# Patient Record
Sex: FEMALE | Race: WHITE | NOT HISPANIC OR LATINO | Employment: UNEMPLOYED | URBAN - METROPOLITAN AREA
[De-identification: names, ages, dates, MRNs, and addresses within clinical notes are randomized per-mention and may not be internally consistent; named-entity substitution may affect disease eponyms.]

---

## 2021-01-25 ENCOUNTER — HOSPITAL ENCOUNTER (EMERGENCY)
Facility: OTHER | Age: 26
Discharge: HOME | End: 2021-01-25
Attending: EMERGENCY MEDICINE

## 2021-01-25 VITALS
BODY MASS INDEX: 24.8 KG/M2 | DIASTOLIC BLOOD PRESSURE: 80 MMHG | SYSTOLIC BLOOD PRESSURE: 120 MMHG | HEART RATE: 68 BPM | HEIGHT: 63 IN | TEMPERATURE: 98 F | WEIGHT: 140 LBS | RESPIRATION RATE: 16 BRPM | OXYGEN SATURATION: 100 %

## 2021-01-25 DIAGNOSIS — N30.01 ACUTE CYSTITIS WITH HEMATURIA: Primary | ICD-10-CM

## 2021-01-25 LAB
AMORPH CRY URNS QL MICRO: ABNORMAL
B-HCG UR QL: NEGATIVE
BACTERIA #/AREA URNS HPF: ABNORMAL /HPF
BILIRUB UR QL STRIP: NEGATIVE
CLARITY UR: ABNORMAL
COLOR UR: ABNORMAL
CTP QC/QA: YES
GLUCOSE UR QL STRIP: ABNORMAL
HGB UR QL STRIP: ABNORMAL
HYALINE CASTS #/AREA URNS LPF: 5 /LPF
KETONES UR QL STRIP: ABNORMAL
LEUKOCYTE ESTERASE UR QL STRIP: ABNORMAL
MICROSCOPIC COMMENT: ABNORMAL
NITRITE UR QL STRIP: POSITIVE
PH UR STRIP: 5 [PH] (ref 5–8)
PROT UR QL STRIP: ABNORMAL
RBC #/AREA URNS HPF: 10 /HPF (ref 0–4)
SP GR UR STRIP: 1.02 (ref 1–1.03)
URN SPEC COLLECT METH UR: ABNORMAL
UROBILINOGEN UR STRIP-ACNC: >=8 EU/DL
WBC #/AREA URNS HPF: 50 /HPF (ref 0–5)

## 2021-01-25 PROCEDURE — 99284 EMERGENCY DEPT VISIT MOD MDM: CPT | Mod: 25

## 2021-01-25 PROCEDURE — 63600175 PHARM REV CODE 636 W HCPCS: Performed by: EMERGENCY MEDICINE

## 2021-01-25 PROCEDURE — 81000 URINALYSIS NONAUTO W/SCOPE: CPT

## 2021-01-25 PROCEDURE — 87086 URINE CULTURE/COLONY COUNT: CPT

## 2021-01-25 PROCEDURE — 96372 THER/PROPH/DIAG INJ SC/IM: CPT

## 2021-01-25 PROCEDURE — 81025 URINE PREGNANCY TEST: CPT | Performed by: EMERGENCY MEDICINE

## 2021-01-25 PROCEDURE — 25000003 PHARM REV CODE 250: Performed by: EMERGENCY MEDICINE

## 2021-01-25 RX ORDER — SULFAMETHOXAZOLE AND TRIMETHOPRIM 800; 160 MG/1; MG/1
1 TABLET ORAL
Status: COMPLETED | OUTPATIENT
Start: 2021-01-25 | End: 2021-01-25

## 2021-01-25 RX ORDER — SULFAMETHOXAZOLE AND TRIMETHOPRIM 800; 160 MG/1; MG/1
1 TABLET ORAL 2 TIMES DAILY
Qty: 10 TABLET | Refills: 0 | Status: SHIPPED | OUTPATIENT
Start: 2021-01-25 | End: 2021-01-30

## 2021-01-25 RX ORDER — KETOROLAC TROMETHAMINE 30 MG/ML
15 INJECTION, SOLUTION INTRAMUSCULAR; INTRAVENOUS
Status: COMPLETED | OUTPATIENT
Start: 2021-01-25 | End: 2021-01-25

## 2021-01-25 RX ORDER — ONDANSETRON 8 MG/1
8 TABLET, ORALLY DISINTEGRATING ORAL
Status: COMPLETED | OUTPATIENT
Start: 2021-01-25 | End: 2021-01-25

## 2021-01-25 RX ORDER — ACETAMINOPHEN 500 MG
1000 TABLET ORAL
Status: COMPLETED | OUTPATIENT
Start: 2021-01-25 | End: 2021-01-25

## 2021-01-25 RX ORDER — ONDANSETRON 4 MG/1
4 TABLET, FILM COATED ORAL EVERY 6 HOURS
Qty: 12 TABLET | Refills: 0 | Status: SHIPPED | OUTPATIENT
Start: 2021-01-25

## 2021-01-25 RX ADMIN — ACETAMINOPHEN 1000 MG: 500 TABLET, FILM COATED ORAL at 06:01

## 2021-01-25 RX ADMIN — SULFAMETHOXAZOLE AND TRIMETHOPRIM 1 TABLET: 800; 160 TABLET ORAL at 06:01

## 2021-01-25 RX ADMIN — KETOROLAC TROMETHAMINE 15 MG: 30 INJECTION, SOLUTION INTRAMUSCULAR at 05:01

## 2021-01-25 RX ADMIN — ONDANSETRON 8 MG: 8 TABLET, ORALLY DISINTEGRATING ORAL at 05:01

## 2021-01-26 LAB
BACTERIA UR CULT: NORMAL
BACTERIA UR CULT: NORMAL

## 2023-01-30 ENCOUNTER — TELEPHONE (OUTPATIENT)
Dept: OBSTETRICS AND GYNECOLOGY | Facility: CLINIC | Age: 28
End: 2023-01-30

## 2023-01-30 NOTE — TELEPHONE ENCOUNTER
Tried to reach patient, unable to leave message.    Patient has soonest appointment, patient can be offered an appointment with another provider if open.    ----- Message from Ayleen Gautam sent at 1/30/2023  4:23 PM CST -----  Regarding: sooner appt  Contact: OPALMARYLUBELIALEIA GIBSON [[id number]]  Type:  Sooner Apoointment Request    Caller is requesting a sooner appointment.  Caller declined first available appointment listed below.  Caller will not accept being placed on the waitlist and is requesting a message be sent to doctor.  Name of Caller:Belia Meeks    When is the first available appointment?5/24  Symptoms:ongoing pain in ovaries .  Would the patient rather a call back or a response via [facility]? Call back   Best Call Back Number:Home Phone      [phone]  Work Phone      Not on file.  Mobile          [phone]      Additional Information:   She declined to see anyone else she states she was recommend and needs to be seen asap.

## 2023-05-24 ENCOUNTER — OFFICE VISIT (OUTPATIENT)
Dept: OBSTETRICS AND GYNECOLOGY | Facility: CLINIC | Age: 28
End: 2023-05-24
Attending: OBSTETRICS & GYNECOLOGY

## 2023-05-24 VITALS
SYSTOLIC BLOOD PRESSURE: 100 MMHG | DIASTOLIC BLOOD PRESSURE: 60 MMHG | BODY MASS INDEX: 22.66 KG/M2 | HEIGHT: 63 IN | WEIGHT: 127.88 LBS

## 2023-05-24 DIAGNOSIS — N94.6 DYSMENORRHEA: ICD-10-CM

## 2023-05-24 DIAGNOSIS — R10.2 PELVIC PAIN IN FEMALE: Primary | ICD-10-CM

## 2023-05-24 DIAGNOSIS — N39.0 RECURRENT UTI: ICD-10-CM

## 2023-05-24 PROCEDURE — 99203 PR OFFICE/OUTPT VISIT, NEW, LEVL III, 30-44 MIN: ICD-10-PCS | Mod: S$PBB,,, | Performed by: OBSTETRICS & GYNECOLOGY

## 2023-05-24 PROCEDURE — 99999 PR PBB SHADOW E&M-EST. PATIENT-LVL III: ICD-10-PCS | Mod: PBBFAC,,, | Performed by: OBSTETRICS & GYNECOLOGY

## 2023-05-24 PROCEDURE — 99999 PR PBB SHADOW E&M-EST. PATIENT-LVL III: CPT | Mod: PBBFAC,,, | Performed by: OBSTETRICS & GYNECOLOGY

## 2023-05-24 PROCEDURE — 99213 OFFICE O/P EST LOW 20 MIN: CPT | Mod: PBBFAC | Performed by: OBSTETRICS & GYNECOLOGY

## 2023-05-24 PROCEDURE — 99203 OFFICE O/P NEW LOW 30 MIN: CPT | Mod: S$PBB,,, | Performed by: OBSTETRICS & GYNECOLOGY

## 2023-05-24 RX ORDER — METHYLPHENIDATE HYDROCHLORIDE 10 MG/1
10 TABLET ORAL 2 TIMES DAILY
COMMUNITY

## 2023-05-24 RX ORDER — METHOCARBAMOL 500 MG/1
500 TABLET, FILM COATED ORAL 4 TIMES DAILY PRN
Qty: 20 TABLET | Refills: 0 | Status: SHIPPED | OUTPATIENT
Start: 2023-05-24

## 2023-05-24 RX ORDER — HYDROCODONE BITARTRATE AND ACETAMINOPHEN 5; 325 MG/1; MG/1
1 TABLET ORAL EVERY 6 HOURS PRN
Qty: 12 TABLET | Refills: 0 | OUTPATIENT
Start: 2023-05-24 | End: 2023-11-08

## 2023-05-24 RX ORDER — DICLOFENAC POTASSIUM 25 MG/1
2 TABLET, FILM COATED ORAL 3 TIMES DAILY PRN
Qty: 30 TABLET | Refills: 3 | Status: SHIPPED | OUTPATIENT
Start: 2023-05-24

## 2023-05-24 NOTE — PROGRESS NOTES
"CC:pelvic pain, cramping, possible cyst rupture.    HPI:reports worsening pelvic cramping prior to periods over past 5 years. Stopped birth control in 2017- ortho cyclen- stopped but not because of any unclear bad reaction to it.    Does report frequent UTI's does seem to corollate with cycles.  Has noted blood from urethra at those times. No history of kidney stones. Culture proven UTIs.  Last pap neg 2022.  U/s result reviewed from [facility].      ROS:  GENERAL: Feeling well overall. Denies fever or chills.   SKIN: Denies rash or lesions.   HEAD: Denies head injury or headache.   NODES: reports enlarged lymph nodes.   ABDOMEN: see HPI  URINARY: see HPI  REPRODUCTIVE: See HPI.   HEMATOLOGIC: No easy bruisability or excessive bleeding.   MUSCULOSKELETAL: Denies joint pain or swelling.       PE:   /60   Ht 5' 3" (1.6 m)   Wt 58 kg (127 lb 13.9 oz)   LMP 04/24/2023 (Exact Date)   BMI 22.65 kg/m²     APPEARANCE: Well nourished, well developed, in no acute distress.  NODES: no cervical, supraclavicular, small inguinal lymphadenopathy  BREASTS: Symmetrical, no skin changes or visible lesions. No palpable masses, nipple discharge or adenopathy bilaterally.  ABDOMEN: Soft. No tenderness or masses. No distention. No hernias palpated. No CVA tenderness. Tender lymph nodes L>R along inguinal ligament  VULVA: No lesions. Normal external female genitalia.  URETHRAL MEATUS: Normal size and location, no lesions, no prolapse.  URETHRA: No masses, tenderness, or prolapse.  VAGINA: Moist. No lesions or lacerations noted. No abnormal discharge present. No odor present.   CERVIX: No lesions or discharge. No cervical motion tenderness. Discomfort with exam, WNL>  UTERUS: Normal size, regular shape, mobile, non-tender.  ADNEXA: Mild TTp right adnexa No fullness or masses palpated in the adnexal regions.  No nudularity at USL.  ANUS PERINEUM: Normal.      Diagnosis:  1. Pelvic pain in female    2. Recurrent UTI    3. Dysmenorrhea  "       Plan:   Start D-[place]  Lofena/ robaxin for dysmenorrhea, vicodin as emergent back up  Start OCP and give 3 months for true results  Urology referral, reach out for new UTI  Orders Placed This Encounter    Ambulatory referral/consult to Urology    norethindrone-e.estradioL-iron (LO LOESTRIN FE) 1 mg-10 mcg (24)/10 mcg (2) Tab    diclofenac potassium (LOFENA) 25 mg Tab    HYDROcodone-acetaminophen (NORCO) 5-325 mg per tablet    methocarbamoL (ROBAXIN) 500 MG Tab         Follow-up with me in 3-6 months or PRN    Lyla Barrientos,

## 2023-06-07 ENCOUNTER — PATIENT MESSAGE (OUTPATIENT)
Dept: OBSTETRICS AND GYNECOLOGY | Facility: CLINIC | Age: 28
End: 2023-06-07

## 2023-06-19 ENCOUNTER — PATIENT MESSAGE (OUTPATIENT)
Dept: OBSTETRICS AND GYNECOLOGY | Facility: CLINIC | Age: 28
End: 2023-06-19

## 2023-06-19 RX ORDER — NORETHINDRONE ACETATE AND ETHINYL ESTRADIOL .02; 1 MG/1; MG/1
1 TABLET ORAL DAILY
Qty: 90 TABLET | Refills: 1 | Status: SHIPPED | OUTPATIENT
Start: 2023-06-19 | End: 2024-06-18

## 2023-11-08 ENCOUNTER — HOSPITAL ENCOUNTER (EMERGENCY)
Facility: OTHER | Age: 28
Discharge: HOME | End: 2023-11-08
Attending: EMERGENCY MEDICINE

## 2023-11-08 VITALS
OXYGEN SATURATION: 98 % | DIASTOLIC BLOOD PRESSURE: 55 MMHG | WEIGHT: 123 LBS | HEART RATE: 68 BPM | SYSTOLIC BLOOD PRESSURE: 114 MMHG | RESPIRATION RATE: 18 BRPM | TEMPERATURE: 98 F | BODY MASS INDEX: 21.79 KG/M2

## 2023-11-08 DIAGNOSIS — R10.2 PELVIC PAIN: Primary | ICD-10-CM

## 2023-11-08 DIAGNOSIS — D21.9 FIBROID: ICD-10-CM

## 2023-11-08 LAB
ALBUMIN SERPL BCP-MCNC: 5 G/DL (ref 3.5–5.2)
ALP SERPL-CCNC: 34 U/L (ref 55–135)
ALT SERPL W/O P-5'-P-CCNC: 14 U/L (ref 10–44)
ANION GAP SERPL CALC-SCNC: 12 MMOL/L (ref 8–16)
AST SERPL-CCNC: 20 U/L (ref 10–40)
B-HCG UR QL: NEGATIVE
BACTERIA GENITAL QL WET PREP: ABNORMAL
BASOPHILS # BLD AUTO: 0.06 K/UL (ref 0–0.2)
BASOPHILS NFR BLD: 0.6 % (ref 0–1.9)
BILIRUB SERPL-MCNC: 0.6 MG/DL (ref 0.1–1)
BILIRUB UR QL STRIP: NEGATIVE
BUN SERPL-MCNC: 8 MG/DL (ref 6–20)
CALCIUM SERPL-MCNC: 9.5 MG/DL (ref 8.7–10.5)
CHLORIDE SERPL-SCNC: 104 MMOL/L (ref 95–110)
CLARITY UR: CLEAR
CLUE CELLS VAG QL WET PREP: ABNORMAL
CO2 SERPL-SCNC: 24 MMOL/L (ref 23–29)
COLOR UR: COLORLESS
CREAT SERPL-MCNC: 0.7 MG/DL (ref 0.5–1.4)
CTP QC/QA: YES
DIFFERENTIAL METHOD: ABNORMAL
EOSINOPHIL # BLD AUTO: 0 K/UL (ref 0–0.5)
EOSINOPHIL NFR BLD: 0.3 % (ref 0–8)
ERYTHROCYTE [DISTWIDTH] IN BLOOD BY AUTOMATED COUNT: 13.3 % (ref 11.5–14.5)
EST. GFR  (NO RACE VARIABLE): >60 ML/MIN/1.73 M^2
FILAMENT FUNGI VAG WET PREP-#/AREA: ABNORMAL
GLUCOSE SERPL-MCNC: 80 MG/DL (ref 70–110)
GLUCOSE UR QL STRIP: NEGATIVE
HCT VFR BLD AUTO: 40.7 % (ref 37–48.5)
HGB BLD-MCNC: 13.6 G/DL (ref 12–16)
HGB UR QL STRIP: NEGATIVE
IMM GRANULOCYTES # BLD AUTO: 0.02 K/UL (ref 0–0.04)
IMM GRANULOCYTES NFR BLD AUTO: 0.2 % (ref 0–0.5)
KETONES UR QL STRIP: ABNORMAL
LEUKOCYTE ESTERASE UR QL STRIP: NEGATIVE
LIPASE SERPL-CCNC: 19 U/L (ref 4–60)
LYMPHOCYTES # BLD AUTO: 1.5 K/UL (ref 1–4.8)
LYMPHOCYTES NFR BLD: 14.9 % (ref 18–48)
MCH RBC QN AUTO: 32.5 PG (ref 27–31)
MCHC RBC AUTO-ENTMCNC: 33.4 G/DL (ref 32–36)
MCV RBC AUTO: 97 FL (ref 82–98)
MONOCYTES # BLD AUTO: 0.5 K/UL (ref 0.3–1)
MONOCYTES NFR BLD: 5.1 % (ref 4–15)
NEUTROPHILS # BLD AUTO: 7.9 K/UL (ref 1.8–7.7)
NEUTROPHILS NFR BLD: 78.9 % (ref 38–73)
NITRITE UR QL STRIP: NEGATIVE
NRBC BLD-RTO: 0 /100 WBC
PH UR STRIP: 6 [PH] (ref 5–8)
PLATELET # BLD AUTO: 285 K/UL (ref 150–450)
PMV BLD AUTO: 10.6 FL (ref 9.2–12.9)
POTASSIUM SERPL-SCNC: 3.3 MMOL/L (ref 3.5–5.1)
PROT SERPL-MCNC: 7.9 G/DL (ref 6–8.4)
PROT UR QL STRIP: NEGATIVE
RBC # BLD AUTO: 4.19 M/UL (ref 4–5.4)
SODIUM SERPL-SCNC: 140 MMOL/L (ref 136–145)
SP GR UR STRIP: <1.005 (ref 1–1.03)
SPECIMEN SOURCE: ABNORMAL
T VAGINALIS GENITAL QL WET PREP: ABNORMAL
URN SPEC COLLECT METH UR: ABNORMAL
UROBILINOGEN UR STRIP-ACNC: NEGATIVE EU/DL
WBC # BLD AUTO: 9.97 K/UL (ref 3.9–12.7)
WBC #/AREA VAG WET PREP: ABNORMAL
YEAST GENITAL QL WET PREP: ABNORMAL

## 2023-11-08 PROCEDURE — 81003 URINALYSIS AUTO W/O SCOPE: CPT | Performed by: PHYSICIAN ASSISTANT

## 2023-11-08 PROCEDURE — 80053 COMPREHEN METABOLIC PANEL: CPT | Performed by: PHYSICIAN ASSISTANT

## 2023-11-08 PROCEDURE — 85025 COMPLETE CBC W/AUTO DIFF WBC: CPT | Performed by: PHYSICIAN ASSISTANT

## 2023-11-08 PROCEDURE — 96374 THER/PROPH/DIAG INJ IV PUSH: CPT

## 2023-11-08 PROCEDURE — 87210 SMEAR WET MOUNT SALINE/INK: CPT | Performed by: PHYSICIAN ASSISTANT

## 2023-11-08 PROCEDURE — 96361 HYDRATE IV INFUSION ADD-ON: CPT

## 2023-11-08 PROCEDURE — 99285 EMERGENCY DEPT VISIT HI MDM: CPT | Mod: 25

## 2023-11-08 PROCEDURE — 81025 URINE PREGNANCY TEST: CPT | Performed by: PHYSICIAN ASSISTANT

## 2023-11-08 PROCEDURE — 25000003 PHARM REV CODE 250: Performed by: PHYSICIAN ASSISTANT

## 2023-11-08 PROCEDURE — 83690 ASSAY OF LIPASE: CPT | Performed by: PHYSICIAN ASSISTANT

## 2023-11-08 PROCEDURE — 87491 CHLMYD TRACH DNA AMP PROBE: CPT | Performed by: PHYSICIAN ASSISTANT

## 2023-11-08 PROCEDURE — 63600175 PHARM REV CODE 636 W HCPCS: Performed by: PHYSICIAN ASSISTANT

## 2023-11-08 RX ORDER — ONDANSETRON 4 MG/1
4 TABLET, ORALLY DISINTEGRATING ORAL EVERY 8 HOURS PRN
Qty: 30 TABLET | Refills: 0 | Status: SHIPPED | OUTPATIENT
Start: 2023-11-08

## 2023-11-08 RX ORDER — HYDROCODONE BITARTRATE AND ACETAMINOPHEN 5; 325 MG/1; MG/1
1 TABLET ORAL EVERY 6 HOURS PRN
Qty: 8 TABLET | Refills: 0 | Status: SHIPPED | OUTPATIENT
Start: 2023-11-08

## 2023-11-08 RX ORDER — KETOROLAC TROMETHAMINE 30 MG/ML
10 INJECTION, SOLUTION INTRAMUSCULAR; INTRAVENOUS
Status: COMPLETED | OUTPATIENT
Start: 2023-11-08 | End: 2023-11-08

## 2023-11-08 RX ORDER — KETOROLAC TROMETHAMINE 10 MG/1
10 TABLET, FILM COATED ORAL EVERY 6 HOURS
Qty: 12 TABLET | Refills: 0 | Status: SHIPPED | OUTPATIENT
Start: 2023-11-08 | End: 2023-11-11

## 2023-11-08 RX ORDER — HYDROCODONE BITARTRATE AND ACETAMINOPHEN 5; 325 MG/1; MG/1
1 TABLET ORAL
Status: COMPLETED | OUTPATIENT
Start: 2023-11-08 | End: 2023-11-08

## 2023-11-08 RX ADMIN — SODIUM CHLORIDE 1674 ML: 0.9 INJECTION, SOLUTION INTRAVENOUS at 06:11

## 2023-11-08 RX ADMIN — HYDROCODONE BITARTRATE AND ACETAMINOPHEN 1 TABLET: 5; 325 TABLET ORAL at 06:11

## 2023-11-08 RX ADMIN — KETOROLAC TROMETHAMINE 10 MG: 30 INJECTION, SOLUTION INTRAMUSCULAR; INTRAVENOUS at 05:11

## 2023-11-08 NOTE — Clinical Note
"Devora Mayo" Jeanna was seen and treated in our emergency department on 11/8/2023.  She may return to work on 11/11/2023.       If you have any questions or concerns, please don't hesitate to call.      Gillian Rooney PA"

## 2023-11-08 NOTE — FIRST PROVIDER EVALUATION
Emergency Department TeleTriage Encounter Note      CHIEF COMPLAINT    Chief Complaint   Patient presents with    Abdominal Pain     LLQ pain over the past 6 hours with diarrhea       VITAL SIGNS   Initial Vitals [11/08/23 1420]   BP Pulse Resp Temp SpO2   (!) 144/81 89 17 97.5 °F (36.4 °C) 100 %      MAP       --            ALLERGIES    Review of patient's allergies indicates:  No Known Allergies    PROVIDER TRIAGE NOTE  Patient presents with complaint of lower abdominal pain for 6 hours prior to arrival.  Reports nausea.  Reports City discharge.  Reports diarrhea.      Phy:   Constitutional: well nourished, well developed, appearing stated age, NAD        Initial orders will be placed and care will be transferred to an alternate provider when patient is roomed for a full evaluation. Any additional orders and the final disposition will be determined by that provider.        ORDERS  Labs Reviewed - No data to display    ED Orders (720h ago, onward)      None              Virtual Visit Note: The provider triage portion of this emergency department evaluation and documentation was performed via [employer], a HIPAA-compliant telemedicine application, in concert with a tele-presenter in the room. A face to face patient evaluation with one of my colleagues will occur once the patient is placed in an emergency department room.      DISCLAIMER: This note was prepared with [employer] voice recognition transcription software. Garbled syntax, mangled pronouns, and other bizarre constructions may be attributed to that software system.

## 2023-11-08 NOTE — ED TRIAGE NOTES
LLQ abdominal pain with N/V/D x 6 hrs. No fever reported. Last emesis this morning - states she is tolerating small sips of water but still feels nauseated. Also reports slight dysuria and light vaginal discharge. Presents in no distress.

## 2023-11-08 NOTE — ED NOTES
Bed: PIT  Expected date:   Expected time:   Means of arrival:   Comments:   Information: Selecting Yes will display possible errors in your note based on the variables you have selected. This validation is only offered as a suggestion for you. PLEASE NOTE THAT THE VALIDATION TEXT WILL BE REMOVED WHEN YOU FINALIZE YOUR NOTE. IF YOU WANT TO FAX A PRELIMINARY NOTE YOU WILL NEED TO TOGGLE THIS TO 'NO' IF YOU DO NOT WANT IT IN YOUR FAXED NOTE.

## 2023-11-10 NOTE — ED PROVIDER NOTES
Encounter Date: 11/8/2023       History     Chief Complaint   Patient presents with    Abdominal Pain     LLQ pain over the past 6 hours with diarrhea     Afebrile 28-year-old female with past medical history of dysmenorrhea presents the ED for evaluation of lower quadrant abdominal pain.  Patient states symptoms began earlier today.  Does state that she is currently on her cycle.  Does state that is darker than normal.  States that she typically has dysmenorrhea however this pain was more sharp in nature.  She endorse diarrhea to triage however states that this is quite typical when she is on her cycle.  She does report some associated nausea vomiting that began this morning.  She is tolerating by mouth however continues with nausea.  She denies any fever, chills, URI symptoms or additional complaints.  Denies any concern of STI.  She does follow with gyn    The history is provided by the patient.     Review of patient's allergies indicates:  No Known Allergies  Past Medical History:   Diagnosis Date    Abnormal Pap smear of cervix      Past Surgical History:   Procedure Laterality Date    CERVICAL BIOPSY  W/ LOOP ELECTRODE EXCISION      COLPOSCOPY       Family History   Problem Relation Age of Onset    Colon cancer Maternal Uncle      Social History     Tobacco Use    Smoking status: Never    Smokeless tobacco: Never   Substance Use Topics    Alcohol use: Yes    Drug use: Never     Review of Systems  As per HPI  Physical Exam     Initial Vitals [11/08/23 1420]   BP Pulse Resp Temp SpO2   (!) 144/81 89 17 97.5 °F (36.4 °C) 100 %      MAP       --         Vitals:    11/08/23 2019   BP: (!) 114/55   Pulse: 68   Resp: 18   Temp: 98.1 °F (36.7 °C)       Physical Exam    Nursing note and vitals reviewed.  Constitutional: Vital signs are normal. She appears well-developed and well-nourished. She is cooperative.  Non-toxic appearance. She does not appear ill. No distress.   HENT:   Head: Normocephalic and atraumatic.    Eyes: Conjunctivae and lids are normal.   Neck: Neck supple.   Cardiovascular:  Normal rate and regular rhythm.           Pulmonary/Chest: Breath sounds normal. No respiratory distress. She has no wheezes. She has no rhonchi.   Abdominal: Abdomen is soft. Bowel sounds are normal. There is abdominal tenderness in the left lower quadrant.   No right CVA tenderness.  No left CVA tenderness. There is no rigidity and no guarding.   Genitourinary:    Genitourinary Comments: Slight bleeding in the vault.  No significant discharge.  No uterine or adnexal mass or tenderness     Musculoskeletal:         General: Normal range of motion.      Cervical back: Neck supple. No rigidity.     Neurological: She is alert and oriented to person, place, and time. GCS eye subscore is 4. GCS verbal subscore is 5. GCS motor subscore is 6.   Skin: Skin is warm, dry and intact. No rash noted.   Psychiatric: She has a normal mood and affect. Her speech is normal and behavior is normal. Thought content normal.       ED Course   Procedures  Labs Reviewed   VAGINAL SCREEN - Abnormal; Notable for the following components:       Result Value    WBC - Vaginal Screen Rare (*)     Bacteria - Vaginal Screen Rare (*)     All other components within normal limits   CBC W/ AUTO DIFFERENTIAL - Abnormal; Notable for the following components:    MCH 32.5 (*)     Gran # (ANC) 7.9 (*)     Gran % 78.9 (*)     Lymph % 14.9 (*)     All other components within normal limits   COMPREHENSIVE METABOLIC PANEL - Abnormal; Notable for the following components:    Potassium 3.3 (*)     Alkaline Phosphatase 34 (*)     All other components within normal limits   URINALYSIS, REFLEX TO URINE CULTURE - Abnormal; Notable for the following components:    Color, UA Colorless (*)     Specific Gravity, UA <1.005 (*)     Ketones, UA Trace (*)     All other components within normal limits    Narrative:     Specimen Source->Urine   C. TRACHOMATIS/N. GONORRHOEAE BY AMP DNA   LIPASE    POCT URINE PREGNANCY          Imaging Results              US Pelvis Comp with Transvag NON-OB (xpd (Final result)  Result time 11/08/23 18:51:43      Final result by Jamal Murdock MD (11/08/23 18:51:43)                   Impression:      No acute sonographic abnormalities of the uterus or ovaries noting small uterine leiomyoma.      Electronically signed by: Jamal Murdock MD  Date:    11/08/2023  Time:    18:51               Narrative:    EXAMINATION:  US PELVIS COMP WITH TRANSVAG NON-OB (XPD)    CLINICAL HISTORY:  left pelvic pain;    TECHNIQUE:  Transabdominal sonography of the pelvis was performed, followed by transvaginal sonography to better evaluate the uterus and ovaries.    COMPARISON:  None.    FINDINGS:  The uterus measures approximately 6.0 x 3.0 x 4.0 cm.  There is a uterine leiomyoma measuring approximately 0.5 x 0.5 x 0.5 cm.  Thin images stripe is not thickened measuring 0.5 cm.  No abnormal endometrial hyperemia.  There are cervical nabothian cysts.    The right ovary measures approximately 2.6 x 2.0 x 2.0 cm.  The left ovary measures approximately 2.8 x 1.4 x 2.2 cm.  Arterial and venous flow is documented to the ovaries bilaterally.  No significant free fluid in the pelvis.                                       Medications   ketorolac injection 9.999 mg (9.999 mg Intravenous Given 11/8/23 1740)   sodium chloride 0.9% bolus 1,674 mL 1,674 mL (0 mLs Intravenous Stopped 11/8/23 1932)   HYDROcodone-acetaminophen 5-325 mg per tablet 1 tablet (1 tablet Oral Given 11/8/23 1852)     Medical Decision Making  Amount and/or Complexity of Data Reviewed  Labs: ordered.  Radiology: ordered.    Risk  Prescription drug management.                          Medical Decision Making:   History:   Old Medical Records: I decided to obtain old medical records.  Initial Assessment:   Emergent evaluation of 28-year-old female presenting with acute left lower quadrant pain.  Describes it as sharp.  Says she is  similar episode some time ago for suspected possible ovarian cyst endorse dysmenorrhea.  Does report she is discuss endometriosis diagnosis with her gyn given her history of dysmenorrhea.  She appears well although in some distress secondary to pain.  Exam notable for tenderness palpation of left lower quadrant.  No guarding, rebound or rigidity.   exam all slight bleeding however no additional abnormalities at this  Differential Diagnosis:   Differential Diagnosis includes, but is not limited to:  STI, HSV, BV, PID, TOA, vaginal foreign body, vaginal trauma, ovarian torsion, ovarian cyst, UTI/pyelonephritis, pregnancy complication, dysmenorrhea, mittelschmertz, appendicitis, nephrolithiasis, appendicitis, colitis, diverticulitis,      Clinical Tests:   Lab Tests: Reviewed and Ordered  Radiological Study: Reviewed and Ordered  ED Management:  Plan for labs, pelvic and transvaginal ultrasound.  Infra IV fluids and analgesia.  Nausea did resolve while here.  Labs grossly unremarkable including wet prep.  G/C pending however lower suspicion given exam findings and patient's low concern.  Ultrasound does reveal small uterine red.  Discuss via low lower suspicion for the acute pain.  It to dysmenorrhea.  She reported some improvement in the ER and will send home with anti-inflammatory and short course of pain medication.  No findings on ultrasound today to suggest torsion and low suspicion of intermittent torsion.  Low suspicion of PID.  Discussed continued monitoring for any new or worsening symptoms and prompt return to the ED for this.Given instructions to return for any acute symptoms and verbalized understanding of this medical plan.        Clinical Impression:   Final diagnoses:  [R10.2] Pelvic pain (Primary)  [D21.9] Fibroid        ED Disposition Condition    Discharge Stable          ED Prescriptions       Medication Sig Dispense Start Date End Date Auth. Provider    ketorolac (TORADOL) 10 mg tablet Take 1  tablet (10 mg total) by mouth every 6 (six) hours. for 3 days 12 tablet 11/8/2023 11/11/2023 Gillian Rooney PA    ondansetron (ZOFRAN-ODT) 4 MG TbDL Take 1 tablet (4 mg total) by mouth every 8 (eight) hours as needed. 30 tablet 11/8/2023 -- Gillian Rooney PA    HYDROcodone-acetaminophen (NORCO) 5-325 mg per tablet Take 1 tablet by mouth every 6 (six) hours as needed for Pain. 8 tablet 11/8/2023 -- Gillian Rooney PA          Follow-up Information       Follow up With Specialties Details Why Contact Lyla Ivan, DO Obstetrics and Gynecology Schedule an appointment as soon as possible for a visit   [address] [place] [id number]  [phone]               Gillian Rooney PA  11/10/23 5944

## 2023-11-15 LAB
C TRACH DNA SPEC QL NAA+PROBE: NOT DETECTED
N GONORRHOEA DNA SPEC QL NAA+PROBE: NOT DETECTED

## 2024-04-18 ENCOUNTER — TELEPHONE (OUTPATIENT)
Dept: OBSTETRICS AND GYNECOLOGY | Facility: CLINIC | Age: 29
End: 2024-04-18

## 2024-04-18 NOTE — TELEPHONE ENCOUNTER
----- Message from Ron Stone sent at 4/18/2024  4:06 PM CDT -----      Name of Who is Calling: BELIA JOSEPH [[id number]]      What is the request in detail: Pt called regarding painful cramps and would like medication.Please contact to further discuss and advise.          Can the clinic reply by [facility]: Y      What Number to Call Back [facility]:  [phone]

## 2024-05-16 ENCOUNTER — OFFICE VISIT (OUTPATIENT)
Dept: OBSTETRICS AND GYNECOLOGY | Facility: CLINIC | Age: 29
End: 2024-05-16
Payer: COMMERCIAL

## 2024-05-16 ENCOUNTER — PATIENT MESSAGE (OUTPATIENT)
Dept: OBSTETRICS AND GYNECOLOGY | Facility: CLINIC | Age: 29
End: 2024-05-16
Payer: COMMERCIAL

## 2024-05-16 VITALS — WEIGHT: 125.25 LBS | BODY MASS INDEX: 22.19 KG/M2 | HEIGHT: 63 IN

## 2024-05-16 DIAGNOSIS — N94.6 DYSMENORRHEA: Primary | ICD-10-CM

## 2024-05-16 DIAGNOSIS — R10.32 LLQ PAIN: ICD-10-CM

## 2024-05-16 PROCEDURE — 87086 URINE CULTURE/COLONY COUNT: CPT | Performed by: OBSTETRICS & GYNECOLOGY

## 2024-05-16 PROCEDURE — 87591 N.GONORRHOEAE DNA AMP PROB: CPT | Performed by: OBSTETRICS & GYNECOLOGY

## 2024-05-16 PROCEDURE — 3008F BODY MASS INDEX DOCD: CPT | Mod: CPTII,S$GLB,, | Performed by: OBSTETRICS & GYNECOLOGY

## 2024-05-16 PROCEDURE — 99999 PR PBB SHADOW E&M-EST. PATIENT-LVL III: CPT | Mod: PBBFAC,,, | Performed by: OBSTETRICS & GYNECOLOGY

## 2024-05-16 PROCEDURE — 3044F HG A1C LEVEL LT 7.0%: CPT | Mod: CPTII,S$GLB,, | Performed by: OBSTETRICS & GYNECOLOGY

## 2024-05-16 PROCEDURE — 87491 CHLMYD TRACH DNA AMP PROBE: CPT | Performed by: OBSTETRICS & GYNECOLOGY

## 2024-05-16 PROCEDURE — 99215 OFFICE O/P EST HI 40 MIN: CPT | Mod: S$GLB,,, | Performed by: OBSTETRICS & GYNECOLOGY

## 2024-05-16 RX ORDER — KETOROLAC TROMETHAMINE 10 MG/1
10 TABLET, FILM COATED ORAL EVERY 6 HOURS
Qty: 20 TABLET | Refills: 1 | Status: SHIPPED | OUTPATIENT
Start: 2024-05-16 | End: 2024-05-27

## 2024-05-16 RX ORDER — DROSPIRENONE AND ETHINYL ESTRADIOL 0.02-3(28)
1 KIT ORAL DAILY
Qty: 90 TABLET | Refills: 4 | Status: SHIPPED | OUTPATIENT
Start: 2024-05-16 | End: 2024-06-15

## 2024-05-16 NOTE — PROGRESS NOTES
History & Physical  Gynecology      SUBJECTIVE:     Chief Complaint: Pelvic Pain       History of Present Illness:  Pt is a 30 y/o who presents with complaints of pelvic pain.  Pt reports that she has had extensive cramps for the last 8 years.  Has started having intense LLQ pain for the past 2 year.  Has pinpoint pain in LLQ that starts with a kneading pressure in the area right before her period and progresses to a sharp pain in that area.  Pt is currently on menses.  Menses are 4 days and very painful.  Menses is not heavy.  Pt started microgestin x 2 weeks but had some mood changes with it and had to stop it.  Was on ortho tri-cyclen and ortho-cyclen in the past.  Period were slightly improved but still cramping when on the OCPs.  Pt has some constipation at baseline.  When she uses the restroom, will have sharp shooting pain in that area.  Reports that she's not straining with BMs.  No blood in stool.  Seen by GI in the past and colonoscopy was normal.  Never been told that she has endometriosis.  Pt is sexually active.  Will occasionally have pain.  Has bulge on left side of abdomen, worse with standing.        Review of patient's allergies indicates:  No Known Allergies    Past Medical History:   Diagnosis Date    Abnormal Pap smear of cervix      Past Surgical History:   Procedure Laterality Date    CERVICAL BIOPSY  W/ LOOP ELECTRODE EXCISION      COLPOSCOPY       OB History          0    Para   0    Term   0       0    AB   0    Living   0         SAB   0    IAB   0    Ectopic   0    Multiple   0    Live Births   0               Family History   Problem Relation Name Age of Onset    Colon cancer Maternal Uncle       Social History     Tobacco Use    Smoking status: Never    Smokeless tobacco: Never   Substance Use Topics    Alcohol use: Yes    Drug use: Never       Current Outpatient Medications   Medication Sig    diclofenac potassium (LOFENA) 25 mg Tab Take 2 tablets by mouth 3 (three) times  daily as needed (dysmenorrhea).    drospirenone-ethinyl estradioL (NBA) 3-0.02 mg per tablet Take 1 tablet by mouth once daily.    HYDROcodone-acetaminophen (NORCO) 5-325 mg per tablet Take 1 tablet by mouth every 6 (six) hours as needed for Pain.    ketorolac (TORADOL) 10 mg tablet Take 1 tablet (10 mg total) by mouth every 6 (six) hours. for 5 days    methocarbamoL (ROBAXIN) 500 MG Tab Take 1 tablet (500 mg total) by mouth 4 (four) times daily as needed (cramping).    methylphenidate HCl (RITALIN) 10 MG tablet Take 10 mg by mouth 2 (two) times daily.    norethindrone-ethinyl estradiol (MICROGESTIN 1/20) 1-20 mg-mcg per tablet Take 1 tablet by mouth once daily.    ondansetron (ZOFRAN) 4 MG tablet Take 1 tablet (4 mg total) by mouth every 6 (six) hours. (Patient not taking: Reported on 5/24/2023)    ondansetron (ZOFRAN-ODT) 4 MG TbDL Take 1 tablet (4 mg total) by mouth every 8 (eight) hours as needed.     No current facility-administered medications for this visit.         Review of Systems:  Review of Systems   Constitutional:  Negative for activity change, appetite change, chills, fatigue, fever and unexpected weight change.   Respiratory:  Negative for cough, shortness of breath and wheezing.    Cardiovascular:  Negative for chest pain and leg swelling.   Gastrointestinal:  Negative for abdominal pain, constipation, diarrhea, nausea and vomiting.   Endocrine: Negative for hair loss and hot flashes.   Genitourinary:  Positive for dysmenorrhea and pelvic pain. Negative for decreased libido, dyspareunia, dysuria, frequency, menstrual problem, vaginal bleeding, vaginal discharge and vaginal pain.   Integumentary:  Negative for acne, hair changes, nipple discharge and breast skin changes.   Neurological:  Negative for headaches.   Psychiatric/Behavioral:  Negative for sleep disturbance.    Breast: Negative for mastodynia, nipple discharge and skin changes       OBJECTIVE:     Physical Exam:  Physical  Exam  Constitutional:       Appearance: She is well-developed.   HENT:      Head: Normocephalic and atraumatic.   Eyes:      General: No scleral icterus.        Right eye: No discharge.         Left eye: No discharge.      Conjunctiva/sclera: Conjunctivae normal.   Pulmonary:      Effort: Pulmonary effort is normal.      Breath sounds: No stridor.   Abdominal:      General: There is no distension.      Palpations: Abdomen is soft.      Tenderness: There is no abdominal tenderness.   Genitourinary:     Comments: Normal external genitalia.  Normal hair distribution.  Urethral meatus normal. No cervical lesions or masses.  No vaginal bleeding noted. On bimanual, no adnexal masses or tenderness noted.  Tenderness on abdomen in LLQ noted.  Pain appears more superficial than deep, as palpation from the inside does not illicit the same pain as the abdomen.  When standing, minor bulge noted on left side.  TTP making exam difficult on that side.  Musculoskeletal:         General: Normal range of motion.   Skin:     General: Skin is warm and dry.   Neurological:      Mental Status: She is alert and oriented to person, place, and time.   Psychiatric:         Behavior: Behavior normal.         Thought Content: Thought content normal.         Judgment: Judgment normal.           ASSESSMENT:       ICD-10-CM ICD-9-CM    1. Dysmenorrhea  N94.6 625.3 drospirenone-ethinyl estradioL (NBA) 3-0.02 mg per tablet      ketorolac (TORADOL) 10 mg tablet      2. LLQ pain  R10.32 789.04 US Abdomen Complete      C. trachomatis/N. gonorrhoeae by AMP DNA      Urine culture             Plan:      Devora was seen today for pelvic pain.    Diagnoses and all orders for this visit:    Dysmenorrhea  -Discussed possible etiologies of pain including dysmenorrhea, endometriosis, GI issues, hernia.   - Discussed concern for endometriosis and ways that this is diagnosed and treated.  Pt would like to proceed with OCPs and pain control.  Does not want dx  laparoscopy at this time.  -     drospirenone-ethinyl estradioL (NBA) 3-0.02 mg per tablet; Take 1 tablet by mouth once daily.  -     ketorolac (TORADOL) 10 mg tablet; Take 1 tablet (10 mg total) by mouth every 6 (six) hours. for 5 days    LLQ pain  - Given mild bulge on left side, will get US to assess for hernia, as well as other etiologies of pain  - Will also test for infections.  -     US Abdomen Complete; Future  -     C. trachomatis/N. gonorrhoeae by AMP DNA  -     Urine culture        Orders Placed This Encounter   Procedures    Urine culture    US Abdomen Complete    C. trachomatis/N. gonorrhoeae by AMP DNA       No follow-ups on file.    Face to Face time with patient: 38 min    45 minutes of total time spent on the encounter, which includes face to face time and non-face to face time preparing to see the patient (eg, review of tests), Obtaining and/or reviewing separately obtained history, Documenting clinical information in the electronic or other health record, Independently interpreting results (not separately reported) and communicating results to the patient/family/caregiver, or Care coordination (not separately reported).      Lakisha Matos

## 2024-05-17 ENCOUNTER — HOSPITAL ENCOUNTER (OUTPATIENT)
Dept: RADIOLOGY | Facility: OTHER | Age: 29
Discharge: HOME | End: 2024-05-17
Attending: OBSTETRICS & GYNECOLOGY
Payer: COMMERCIAL

## 2024-05-17 DIAGNOSIS — R10.32 LLQ PAIN: ICD-10-CM

## 2024-05-17 PROCEDURE — 76705 ECHO EXAM OF ABDOMEN: CPT | Mod: TC

## 2024-05-17 PROCEDURE — 76705 ECHO EXAM OF ABDOMEN: CPT | Mod: 26,,, | Performed by: RADIOLOGY

## 2024-05-18 ENCOUNTER — HOSPITAL ENCOUNTER (EMERGENCY)
Facility: OTHER | Age: 29
Discharge: HOME | End: 2024-05-19
Attending: INTERNAL MEDICINE
Payer: COMMERCIAL

## 2024-05-18 DIAGNOSIS — R93.2 ABNORMAL CT OF LIVER: Primary | ICD-10-CM

## 2024-05-18 DIAGNOSIS — R10.9 ABDOMINAL PAIN, UNSPECIFIED ABDOMINAL LOCATION: ICD-10-CM

## 2024-05-18 LAB
ALBUMIN SERPL BCP-MCNC: 4.4 G/DL (ref 3.5–5.2)
ALP SERPL-CCNC: 29 U/L (ref 55–135)
ALT SERPL W/O P-5'-P-CCNC: 12 U/L (ref 10–44)
ANION GAP SERPL CALC-SCNC: 11 MMOL/L (ref 8–16)
AST SERPL-CCNC: 17 U/L (ref 10–40)
B-HCG UR QL: NEGATIVE
BASOPHILS # BLD AUTO: 0.04 K/UL (ref 0–0.2)
BASOPHILS NFR BLD: 0.7 % (ref 0–1.9)
BILIRUB SERPL-MCNC: 0.3 MG/DL (ref 0.1–1)
BILIRUB UR QL STRIP: NEGATIVE
BILIRUB UR QL STRIP: NEGATIVE
BUN SERPL-MCNC: 11 MG/DL (ref 6–20)
CALCIUM SERPL-MCNC: 9.5 MG/DL (ref 8.7–10.5)
CHLORIDE SERPL-SCNC: 105 MMOL/L (ref 95–110)
CLARITY UR: CLEAR
CLARITY UR: CLEAR
CO2 SERPL-SCNC: 24 MMOL/L (ref 23–29)
COLOR UR: COLORLESS
COLOR UR: COLORLESS
CREAT SERPL-MCNC: 0.8 MG/DL (ref 0.5–1.4)
CTP QC/QA: YES
DIFFERENTIAL METHOD BLD: ABNORMAL
EOSINOPHIL # BLD AUTO: 0.1 K/UL (ref 0–0.5)
EOSINOPHIL NFR BLD: 1.8 % (ref 0–8)
ERYTHROCYTE [DISTWIDTH] IN BLOOD BY AUTOMATED COUNT: 13 % (ref 11.5–14.5)
EST. GFR  (NO RACE VARIABLE): >60 ML/MIN/1.73 M^2
GLUCOSE SERPL-MCNC: 85 MG/DL (ref 70–110)
GLUCOSE UR QL STRIP: NEGATIVE
GLUCOSE UR QL STRIP: NEGATIVE
HCT VFR BLD AUTO: 36.9 % (ref 37–48.5)
HGB BLD-MCNC: 12.1 G/DL (ref 12–16)
HGB UR QL STRIP: NEGATIVE
HGB UR QL STRIP: NEGATIVE
IMM GRANULOCYTES # BLD AUTO: 0.01 K/UL (ref 0–0.04)
IMM GRANULOCYTES NFR BLD AUTO: 0.2 % (ref 0–0.5)
KETONES UR QL STRIP: NEGATIVE
KETONES UR QL STRIP: NEGATIVE
LEUKOCYTE ESTERASE UR QL STRIP: NEGATIVE
LEUKOCYTE ESTERASE UR QL STRIP: NEGATIVE
LIPASE SERPL-CCNC: 64 U/L (ref 4–60)
LYMPHOCYTES # BLD AUTO: 2.3 K/UL (ref 1–4.8)
LYMPHOCYTES NFR BLD: 40.3 % (ref 18–48)
MCH RBC QN AUTO: 32 PG (ref 27–31)
MCHC RBC AUTO-ENTMCNC: 32.8 G/DL (ref 32–36)
MCV RBC AUTO: 98 FL (ref 82–98)
MONOCYTES # BLD AUTO: 0.3 K/UL (ref 0.3–1)
MONOCYTES NFR BLD: 5.5 % (ref 4–15)
NEUTROPHILS # BLD AUTO: 2.9 K/UL (ref 1.8–7.7)
NEUTROPHILS NFR BLD: 51.5 % (ref 38–73)
NITRITE UR QL STRIP: NEGATIVE
NITRITE UR QL STRIP: NEGATIVE
NRBC BLD-RTO: 0 /100 WBC
PH UR STRIP: 6 [PH] (ref 5–8)
PH UR STRIP: 6 [PH] (ref 5–8)
PLATELET # BLD AUTO: 235 K/UL (ref 150–450)
PMV BLD AUTO: 10.7 FL (ref 9.2–12.9)
POTASSIUM SERPL-SCNC: 3.8 MMOL/L (ref 3.5–5.1)
PROT SERPL-MCNC: 7 G/DL (ref 6–8.4)
PROT UR QL STRIP: NEGATIVE
PROT UR QL STRIP: NEGATIVE
RBC # BLD AUTO: 3.78 M/UL (ref 4–5.4)
SODIUM SERPL-SCNC: 140 MMOL/L (ref 136–145)
SP GR UR STRIP: 1 (ref 1–1.03)
SP GR UR STRIP: 1 (ref 1–1.03)
URN SPEC COLLECT METH UR: ABNORMAL
URN SPEC COLLECT METH UR: ABNORMAL
UROBILINOGEN UR STRIP-ACNC: NEGATIVE EU/DL
UROBILINOGEN UR STRIP-ACNC: NEGATIVE EU/DL
WBC # BLD AUTO: 5.59 K/UL (ref 3.9–12.7)

## 2024-05-18 PROCEDURE — 99285 EMERGENCY DEPT VISIT HI MDM: CPT | Mod: 25

## 2024-05-18 PROCEDURE — 83690 ASSAY OF LIPASE: CPT | Performed by: PHYSICIAN ASSISTANT

## 2024-05-18 PROCEDURE — 96361 HYDRATE IV INFUSION ADD-ON: CPT

## 2024-05-18 PROCEDURE — 85025 COMPLETE CBC W/AUTO DIFF WBC: CPT | Performed by: PHYSICIAN ASSISTANT

## 2024-05-18 PROCEDURE — 81003 URINALYSIS AUTO W/O SCOPE: CPT | Mod: 91 | Performed by: PHYSICIAN ASSISTANT

## 2024-05-18 PROCEDURE — 80053 COMPREHEN METABOLIC PANEL: CPT | Performed by: PHYSICIAN ASSISTANT

## 2024-05-18 PROCEDURE — 63600175 PHARM REV CODE 636 W HCPCS: Performed by: NURSE PRACTITIONER

## 2024-05-18 PROCEDURE — 25000003 PHARM REV CODE 250: Performed by: NURSE PRACTITIONER

## 2024-05-18 PROCEDURE — 96375 TX/PRO/DX INJ NEW DRUG ADDON: CPT

## 2024-05-18 PROCEDURE — 81025 URINE PREGNANCY TEST: CPT | Performed by: PHYSICIAN ASSISTANT

## 2024-05-18 PROCEDURE — 96374 THER/PROPH/DIAG INJ IV PUSH: CPT | Mod: 59

## 2024-05-18 PROCEDURE — 81003 URINALYSIS AUTO W/O SCOPE: CPT | Performed by: NURSE PRACTITIONER

## 2024-05-18 PROCEDURE — 25500020 PHARM REV CODE 255: Performed by: NURSE PRACTITIONER

## 2024-05-18 RX ORDER — KETOROLAC TROMETHAMINE 30 MG/ML
15 INJECTION, SOLUTION INTRAMUSCULAR; INTRAVENOUS
Status: COMPLETED | OUTPATIENT
Start: 2024-05-18 | End: 2024-05-18

## 2024-05-18 RX ORDER — HYDROCODONE BITARTRATE AND ACETAMINOPHEN 5; 325 MG/1; MG/1
1 TABLET ORAL
Status: COMPLETED | OUTPATIENT
Start: 2024-05-18 | End: 2024-05-18

## 2024-05-18 RX ORDER — ONDANSETRON HYDROCHLORIDE 2 MG/ML
4 INJECTION, SOLUTION INTRAVENOUS ONCE
Status: COMPLETED | OUTPATIENT
Start: 2024-05-18 | End: 2024-05-18

## 2024-05-18 RX ORDER — MORPHINE SULFATE 4 MG/ML
4 INJECTION, SOLUTION INTRAMUSCULAR; INTRAVENOUS ONCE
Status: COMPLETED | OUTPATIENT
Start: 2024-05-18 | End: 2024-05-18

## 2024-05-18 RX ORDER — HYDROCODONE BITARTRATE AND ACETAMINOPHEN 5; 325 MG/1; MG/1
1 TABLET ORAL EVERY 6 HOURS PRN
Qty: 6 TABLET | Refills: 0 | Status: SHIPPED | OUTPATIENT
Start: 2024-05-18 | End: 2024-05-22

## 2024-05-18 RX ORDER — ONDANSETRON 4 MG/1
4 TABLET, ORALLY DISINTEGRATING ORAL
Status: DISCONTINUED | OUTPATIENT
Start: 2024-05-18 | End: 2024-05-18

## 2024-05-18 RX ADMIN — IOHEXOL 75 ML: 350 INJECTION, SOLUTION INTRAVENOUS at 08:05

## 2024-05-18 RX ADMIN — KETOROLAC TROMETHAMINE 15 MG: 30 INJECTION, SOLUTION INTRAMUSCULAR at 07:05

## 2024-05-18 RX ADMIN — ONDANSETRON 4 MG: 2 INJECTION INTRAMUSCULAR; INTRAVENOUS at 07:05

## 2024-05-18 RX ADMIN — HYDROCODONE BITARTRATE AND ACETAMINOPHEN 1 TABLET: 5; 325 TABLET ORAL at 10:05

## 2024-05-18 RX ADMIN — MORPHINE SULFATE 4 MG: 4 INJECTION, SOLUTION INTRAMUSCULAR; INTRAVENOUS at 09:05

## 2024-05-18 RX ADMIN — SODIUM CHLORIDE, POTASSIUM CHLORIDE, SODIUM LACTATE AND CALCIUM CHLORIDE 1000 ML: 600; 310; 30; 20 INJECTION, SOLUTION INTRAVENOUS at 07:05

## 2024-05-18 NOTE — FIRST PROVIDER EVALUATION
" Emergency Department TeleTriage Encounter Note      CHIEF COMPLAINT    Chief Complaint   Patient presents with    Abdominal Pain     LLQ "stabbing pain" and bilateral flank pain, worsening with BM x 4 days. Denies dysuria.        VITAL SIGNS   Initial Vitals [05/18/24 1757]   BP Pulse Resp Temp SpO2   (!) 142/74 61 16 98.2 °F (36.8 °C) 100 %      MAP       --            ALLERGIES    Review of patient's allergies indicates:  No Known Allergies    PROVIDER TRIAGE NOTE  Patient presents with LLQ abdominal pain, bilateral flank pain, nausea and diarrhea. No melena or hematochezia. No urinary symptoms.       ORDERS  Labs Reviewed - No data to display    ED Orders (720h ago, onward)      None              Virtual Visit Note: The provider triage portion of this emergency department evaluation and documentation was performed via [employer], a HIPAA-compliant telemedicine application, in concert with a tele-presenter in the room. A face to face patient evaluation with one of my colleagues will occur once the patient is placed in an emergency department room.      DISCLAIMER: This note was prepared with [employer] voice recognition transcription software. Garbled syntax, mangled pronouns, and other bizarre constructions may be attributed to that software system.    "

## 2024-05-19 VITALS
HEIGHT: 63 IN | BODY MASS INDEX: 22.15 KG/M2 | HEART RATE: 58 BPM | TEMPERATURE: 98 F | SYSTOLIC BLOOD PRESSURE: 113 MMHG | RESPIRATION RATE: 16 BRPM | OXYGEN SATURATION: 100 % | DIASTOLIC BLOOD PRESSURE: 57 MMHG | WEIGHT: 125 LBS

## 2024-05-19 LAB
BACTERIA UR CULT: NORMAL
BACTERIA UR CULT: NORMAL

## 2024-05-19 NOTE — DISCHARGE INSTRUCTIONS
Diagnosis:   1. Abnormal CT of liver    2. Abdominal pain, unspecified abdominal location        Tests you had showed:   Labs Reviewed   CBC W/ AUTO DIFFERENTIAL - Abnormal; Notable for the following components:       Result Value    RBC 3.78 (*)     Hematocrit 36.9 (*)     MCH 32.0 (*)     All other components within normal limits   COMPREHENSIVE METABOLIC PANEL - Abnormal; Notable for the following components:    Alkaline Phosphatase 29 (*)     All other components within normal limits   LIPASE - Abnormal; Notable for the following components:    Lipase 64 (*)     All other components within normal limits   URINALYSIS, REFLEX TO URINE CULTURE - Abnormal; Notable for the following components:    Color, UA Colorless (*)     All other components within normal limits    Narrative:     Specimen Source->Urine   URINALYSIS, REFLEX TO URINE CULTURE - Abnormal; Notable for the following components:    Color, UA Colorless (*)     All other components within normal limits    Narrative:     Specimen Source->Urine   POCT URINE PREGNANCY      CT Abdomen Pelvis With IV Contrast NO Oral Contrast   Final Result   Abnormal      1. No acute process or CT findings identified to explain patient's symptoms of left lower quadrant pain.  Specifically, no significant colonic diverticular disease or left lower quadrant/pelvic inflammatory process.   2. Hepatomegaly.   3. Right hepatic lobe 1.9 cm hypodense lesion not consistent with a simple cyst and additional right hepatic lobe heterogeneous enhancing hypodense lesion which could reflect an atypical hemangioma but remains incompletely characterized.  Further characterization with elective/nonemergent MRI abdomen with hepatic mass protocol can be obtained as warranted.   4. Small volume nonspecific free pelvic fluid, commonly physiologic in this age group.   This report was flagged in Epic as containing an incidental finding.         Electronically signed by: Michael Erickson MD    Date:    05/18/2024   Time:    20:40          Treatments you received were:   Medications   lactated ringers bolus 1,000 mL (0 mLs Intravenous Stopped 5/18/24 2058)   ketorolac injection 15 mg (15 mg Intravenous Given 5/18/24 1949)   ondansetron injection 4 mg (4 mg Intravenous Given 5/18/24 1943)   iohexoL (OMNIPAQUE 350) injection 75 mL (75 mLs Intravenous Given 5/18/24 2025)   morphine injection 4 mg (4 mg Intravenous Given 5/18/24 2137)   HYDROcodone-acetaminophen 5-325 mg per tablet 1 tablet (1 tablet Oral Given 5/18/24 2229)       Home Care Instructions:  - Medications: Continue taking your home medications as prescribed    Follow-Up Plan:  - Follow-up with: Primary care doctor within 1-2  days  - Additional testing and/or evaluation will be directed by your primary doctor    Return to the Emergency Department for symptoms including but not limited to: worsening symptoms, severe back pain, shortness of breath or chest pain, vomiting with inability to hold down fluids, blood in vomit or poop, fevers greater than 100.4°F, passing out/fainting/unconsciousness, or other concerning symptoms.     Future Appointments   Date Time Provider Department Center   6/24/2024  1:30 PM Lyla Barrientos,  [facility]

## 2024-05-19 NOTE — ED PROVIDER NOTES
"Source of History:  Patient     Chief complaint:  Abdominal Pain (LLQ "stabbing pain" and bilateral flank pain, worsening with BM x 4 days. Denies dysuria. )      HPI:  Devora Meeks is a 29 y.o. female presenting to the emergency department reporting left lower quadrant pain that began 4 days ago, she developed some bilateral flank pain 2 days ago.  Reports pain worse when having a bowel movement which prompted her presents emergency department.  She would reports nausea without vomiting, no diarrhea.  Denies any fever/chills.  No history of diverticulitis.  Reports she was on the last day of her period and having brownish discharge.  Denies any dysuria.    This is the extent to the patients complaints today here in the emergency department.    PMH:  As per HPI and below:  Past Medical History:   Diagnosis Date    Abnormal Pap smear of cervix      Past Surgical History:   Procedure Laterality Date    CERVICAL BIOPSY  W/ LOOP ELECTRODE EXCISION      COLPOSCOPY         Social History     Tobacco Use    Smoking status: Never    Smokeless tobacco: Never   Substance Use Topics    Alcohol use: Yes    Drug use: Never       Review of patient's allergies indicates:  No Known Allergies    ROS: As per HPI and below:  General: No fever.  Subjective chills  ENT: No sore throat. No ear pain  Chest: No shortness of breath. No cough.    Cardiovascular: No chest pain.   Abdomen:  Left lower quadrant pain, nausea  Genito-Urinary: No abnormal urination.    Neurologic: No focal weakness.  No numbness.  No headache  MSK:  Bilateral flank pain.  Integument: No rashes or lesions.    Physical Exam:    BP (!) 113/57 (BP Location: Right arm, Patient Position: Lying)   Pulse (!) 58   Temp 97.7 °F (36.5 °C) (Oral)   Resp 16   Ht 5' 3" (1.6 m)   Wt 56.7 kg (125 lb)   LMP 05/15/2024   SpO2 100%   Breastfeeding No   BMI 22.14 kg/m²   Vitals:    05/18/24 1757 05/18/24 2137 05/18/24 2229 05/18/24 2358   BP: (!) 142/74   (!) " "113/57   Pulse: 61   (!) 58   Resp: 16 17 18 16   Temp: 98.2 °F (36.8 °C)   97.7 °F (36.5 °C)   TempSrc: Oral   Oral   SpO2: 100%   100%   Weight: 56.7 kg (125 lb)      Height: 5' 3" (1.6 m)          Nursing note and vital signs reviewed.  Appearance: No acute distress.  Uncomfortable appearing female  Eyes: No conjunctival injection.  Extraocular muscles are intact.  ENT:  Mucous membranes are pink and moist  Chest/ Respiratory: Clear to auscultation bilaterally.  Good air movement.  No wheezes.  No rhonchi. No rales. No accessory muscle use.  Cardiovascular: Regular rate and rhythm.  No murmurs. No gallops. No rubs.  Abdomen: Soft.  Left lower abdominal tenderness on exam without distention or guarding.  Back:  No CVA tenderness.  Musculoskeletal: Good range of motion all joints.  No deformities.  Neck supple.  No meningismus.  Skin: No rashes seen.  Good turgor.  No abrasions.  No ecchymoses.  Neuro: alert and oriented x3,  no focal neurological deficits.  Psych: Appropriate, conversant      Labs Reviewed   CBC W/ AUTO DIFFERENTIAL - Abnormal; Notable for the following components:       Result Value    RBC 3.78 (*)     Hematocrit 36.9 (*)     MCH 32.0 (*)     All other components within normal limits   COMPREHENSIVE METABOLIC PANEL - Abnormal; Notable for the following components:    Alkaline Phosphatase 29 (*)     All other components within normal limits   LIPASE - Abnormal; Notable for the following components:    Lipase 64 (*)     All other components within normal limits   URINALYSIS, REFLEX TO URINE CULTURE - Abnormal; Notable for the following components:    Color, UA Colorless (*)     All other components within normal limits    Narrative:     Specimen Source->Urine   URINALYSIS, REFLEX TO URINE CULTURE - Abnormal; Notable for the following components:    Color, UA Colorless (*)     All other components within normal limits    Narrative:     Specimen Source->Urine   POCT URINE PREGNANCY       CT Abdomen " Pelvis With IV Contrast NO Oral Contrast   Final Result   Abnormal      1. No acute process or CT findings identified to explain patient's symptoms of left lower quadrant pain.  Specifically, no significant colonic diverticular disease or left lower quadrant/pelvic inflammatory process.   2. Hepatomegaly.   3. Right hepatic lobe 1.9 cm hypodense lesion not consistent with a simple cyst and additional right hepatic lobe heterogeneous enhancing hypodense lesion which could reflect an atypical hemangioma but remains incompletely characterized.  Further characterization with elective/nonemergent MRI abdomen with hepatic mass protocol can be obtained as warranted.   4. Small volume nonspecific free pelvic fluid, commonly physiologic in this age group.   This report was flagged in Epic as containing an incidental finding.         Electronically signed by: Michael Erickson MD   Date:    05/18/2024   Time:    20:40            Initial Impression/ Differential Dx:  Differential diagnosis includes but not limited to: GERD, intestinal spasm, gastroenteritis, gastritis, ulcer, cholecystitis, cholelithiasis, gallstones, pancreatitis, ileus, small bowel obstruction, appendicitis, diverticulitis, colitis, constipation, intestinal gas pain      MDM:    Medical Decision Making  29-year-old female with left lower quadrant pain for the last 4 days and bilateral flank pain.  No history of diverticulitis.  On exam there was some left lower abdominal tenderness without distention or guarding.  Labs revealed no leukocytosis, stable H&H, no significant electrolyte abnormality, no SOCRATES.  Lipase slightly elevated at 64.  CT obtained revealed no acute process including diverticulitis.  She did have some nonspecific free pelvic fluid which could possibly be the cause of the patient's pain.  CT did reveal a likely cyst in her hepatic lobe, discussed findings with the patient need for follow up with the primary care.  She is currently on the end of  her menstrual cycle.  Patient was afebrile.  She reports persistent pain even after morphine administration, ultrasound ordered to assess patient's ovaries.  At time of sign-out to Dr. Jose metcalf, ultrasound was still pending    Amount and/or Complexity of Data Reviewed  Labs:  Decision-making details documented in ED Course.  Radiology: ordered.    Risk  Prescription drug management.        ED Course as of 05/19/24 2218   Sat May 18, 2024   1949 WBC: 5.59 [AS]   1947 Hemoglobin: 12.1 [AS]   1947 Hematocrit(!): 36.9 [AS]   1947 Platelet Count: 235 [AS]   2018 Lipase(!): 64 [AS]   2018 Sodium: 140 [AS]   2018 Potassium: 3.8 [AS]   2018 Glucose: 85 [AS]   2018 BUN: 11 [AS]   2018 Creatinine: 0.8 [AS]   2018 Anion Gap: 11 [AS]   2300 Received this patient in sign-out briefly this is 29 y.o. female presenting to the emergency department reporting left lower quadrant pain that began 4 days ago, she developed some bilateral flank pain 2 days ago.  CMP, CBC grossly unremarkable urinalysis not consistent with UTI lipase mildly elevated.  Urine pregnancy negative.  Her CT had some nonspecific abnormalities.  At time of sign-out decision was made to obtain a ultrasound of the ovaries as she was having ongoing pain and she was given additional dose of Norco.  Signed out to me pending reassessment and ultrasound of the pelvis with anticipated discharge home. []   Mindy May 19, 2024   0050 It was noted that patient recently had an abdominal ultrasound.  I discussed with the patient the utility of a non OB ultrasound.  Her symptoms do not seem to be consistent with ovarian torsion.  Her repeat abdominal exam is generally benign.  Her CT was reviewed and she p.o. challenged successfully.  She was given a short course of Norco.  Instructed to follow up with internal medicine and given GI outpatient follow-up.  Strict return precautions given.  Care plan addressed with patient and all those present. All questions answered.  Strict  return precautions discussed.  Patient was instructed on the correct follow-up time and route.  They voiced verbal understanding and agreement  with the plan and were deemed stable for discharge.  [HM]      ED Course User Index  [AS] Brandi Ashford, ANASTACIO  [] Leonardo Cabrera MD       Diagnostic Impression:    1. Abnormal CT of liver    2. Abdominal pain, unspecified abdominal location         ED Disposition Condition    Discharge Stable            ED Prescriptions       Medication Sig Dispense Start Date End Date Auth. Provider    HYDROcodone-acetaminophen (NORCO) 5-325 mg per tablet Take 1 tablet by mouth every 6 (six) hours as needed for Pain. 6 tablet 5/18/2024 -- Leonardo Cabrera MD          Follow-up Information       Follow up With Specialties Details Why Contact Info    [facility]  Schedule an appointment as soon as possible for a visit in 2 days or the primary care doctor of your choice [address] [facility] [address]  [phone]      [facility] - Emergency Dept Emergency Medicine  If symptoms worsen [address] [facility] [id number] [phone]    Robert Licona MD Gastroenterology   [address] [place] [address] [address]  [facility] [address]  [phone]                 Brandi Ashford, ANASTACIO  05/18/24 2250       Brandi Ashford, ANASTACIO  05/19/24 [id number]

## 2024-05-20 ENCOUNTER — PATIENT MESSAGE (OUTPATIENT)
Dept: OBSTETRICS AND GYNECOLOGY | Facility: CLINIC | Age: 29
End: 2024-05-20
Payer: COMMERCIAL

## 2024-05-21 ENCOUNTER — TELEPHONE (OUTPATIENT)
Dept: GASTROENTEROLOGY | Facility: CLINIC | Age: 29
End: 2024-05-21
Payer: COMMERCIAL

## 2024-05-21 LAB
C TRACH DNA SPEC QL NAA+PROBE: NOT DETECTED
N GONORRHOEA DNA SPEC QL NAA+PROBE: NOT DETECTED

## 2024-05-21 NOTE — TELEPHONE ENCOUNTER
----- Message from Raquel Calderón sent at 5/21/2024  1:57 PM CDT -----  Regarding: pranay mensaht  Contact: Pt @[phone]  Pt is calling to speak to someone in the office; asking for a sooner appt than what they are scheduled for. Pt is already on the wait list; no available appts in Epic that are coming up soon. Pt is asking to speak to someone in the office. Please call to advise. Thanks.         Reason for sooner appt: severe abdominal pain

## 2024-05-21 NOTE — TELEPHONE ENCOUNTER
Spoke with patient. Advised that I did not have any appointment earlier than 5/27 at this point.  Also advised if the pain is severe, she should go to ER.  Patient verbalized understanding.

## 2024-05-22 ENCOUNTER — OFFICE VISIT (OUTPATIENT)
Dept: PRIMARY CARE CLINIC | Facility: CLINIC | Age: 29
End: 2024-05-22
Payer: COMMERCIAL

## 2024-05-22 VITALS
HEART RATE: 79 BPM | BODY MASS INDEX: 23.17 KG/M2 | WEIGHT: 130.75 LBS | HEIGHT: 63 IN | DIASTOLIC BLOOD PRESSURE: 78 MMHG | TEMPERATURE: 99 F | OXYGEN SATURATION: 100 % | SYSTOLIC BLOOD PRESSURE: 132 MMHG

## 2024-05-22 DIAGNOSIS — R93.5 ABNORMAL CT OF THE ABDOMEN: Primary | ICD-10-CM

## 2024-05-22 DIAGNOSIS — Z13.1 SCREENING FOR DIABETES MELLITUS: ICD-10-CM

## 2024-05-22 DIAGNOSIS — Z75.8 DOES NOT HAVE PRIMARY CARE PROVIDER: ICD-10-CM

## 2024-05-22 DIAGNOSIS — Z11.59 ENCOUNTER FOR HEPATITIS C SCREENING TEST FOR LOW RISK PATIENT: ICD-10-CM

## 2024-05-22 DIAGNOSIS — K76.9 LIVER DISEASE, UNSPECIFIED: ICD-10-CM

## 2024-05-22 DIAGNOSIS — Z13.220 SCREENING CHOLESTEROL LEVEL: ICD-10-CM

## 2024-05-22 DIAGNOSIS — K42.9 UMBILICAL HERNIA WITHOUT OBSTRUCTION AND WITHOUT GANGRENE: ICD-10-CM

## 2024-05-22 DIAGNOSIS — K76.9 HEPATIC LESION: ICD-10-CM

## 2024-05-22 DIAGNOSIS — R10.32 LLQ ABDOMINAL PAIN: ICD-10-CM

## 2024-05-22 DIAGNOSIS — Z11.4 SCREENING FOR HIV (HUMAN IMMUNODEFICIENCY VIRUS): ICD-10-CM

## 2024-05-22 PROCEDURE — 1159F MED LIST DOCD IN RCRD: CPT | Mod: CPTII,S$GLB,, | Performed by: NURSE PRACTITIONER

## 2024-05-22 PROCEDURE — 3075F SYST BP GE 130 - 139MM HG: CPT | Mod: CPTII,S$GLB,, | Performed by: NURSE PRACTITIONER

## 2024-05-22 PROCEDURE — 96372 THER/PROPH/DIAG INJ SC/IM: CPT | Mod: S$GLB,,, | Performed by: NURSE PRACTITIONER

## 2024-05-22 PROCEDURE — 99215 OFFICE O/P EST HI 40 MIN: CPT | Mod: 25,S$GLB,, | Performed by: NURSE PRACTITIONER

## 2024-05-22 PROCEDURE — 99999 PR PBB SHADOW E&M-EST. PATIENT-LVL IV: CPT | Mod: PBBFAC,,, | Performed by: NURSE PRACTITIONER

## 2024-05-22 PROCEDURE — 3008F BODY MASS INDEX DOCD: CPT | Mod: CPTII,S$GLB,, | Performed by: NURSE PRACTITIONER

## 2024-05-22 PROCEDURE — 3078F DIAST BP <80 MM HG: CPT | Mod: CPTII,S$GLB,, | Performed by: NURSE PRACTITIONER

## 2024-05-22 RX ORDER — CLONAZEPAM 0.5 MG/1
0.5 TABLET ORAL 2 TIMES DAILY PRN
COMMUNITY
End: 2024-05-22

## 2024-05-22 RX ORDER — BUPROPION HYDROCHLORIDE 150 MG/1
150 TABLET ORAL
COMMUNITY
End: 2024-05-22

## 2024-05-22 RX ORDER — NEOMYCIN SULFATE, POLYMYXIN B SULFATE, HYDROCORTISONE 3.5; 10000; 1 MG/ML; [USP'U]/ML; MG/ML
SOLUTION/ DROPS AURICULAR (OTIC)
COMMUNITY
Start: 2024-04-27 | End: 2024-05-22

## 2024-05-22 RX ORDER — METHYLPHENIDATE HYDROCHLORIDE 5 MG/1
5 TABLET ORAL 2 TIMES DAILY
COMMUNITY
Start: 2024-04-24

## 2024-05-22 RX ORDER — BUSPIRONE HYDROCHLORIDE 5 MG/1
5 TABLET ORAL 2 TIMES DAILY
COMMUNITY

## 2024-05-22 RX ORDER — CALCIUM CARB/VITAMIN D3/VIT K1 500-500-40
2 TABLET,CHEWABLE ORAL DAILY
COMMUNITY
Start: 2023-09-26 | End: 2024-05-22

## 2024-05-22 RX ORDER — KETOROLAC TROMETHAMINE 30 MG/ML
30 INJECTION, SOLUTION INTRAMUSCULAR; INTRAVENOUS
Status: COMPLETED | OUTPATIENT
Start: 2024-05-22 | End: 2024-05-22

## 2024-05-22 RX ADMIN — KETOROLAC TROMETHAMINE 30 MG: 30 INJECTION, SOLUTION INTRAMUSCULAR; INTRAVENOUS at 05:05

## 2024-05-22 NOTE — PROGRESS NOTES
Subjective:       Patient ID: Devora Meeks is a 29 y.o. female.    Chief Complaint: Abdominal Pain    Ms. Devora Meeks is a 29 year old female, new to me, presents to the clinic with complaints of lower abdominal pain. No PCP. Medical and surgical history in addition to problem list reviewed as listed below.     Abdominal Pain  This is a new problem. The current episode started 1 to 4 weeks ago. The onset quality is sudden. The problem occurs constantly. The problem has been gradually worsening (states she has not had any alcoholic beverages in a week and a half). The pain is located in the LLQ. The pain is at a severity of 8/10. The pain is severe. The quality of the pain is aching and cramping. The abdominal pain does not radiate. Associated symptoms include headaches and nausea. Pertinent negatives include no belching, constipation, dysuria, frequency, hematochezia, hematuria or vomiting. Nothing aggravates the pain. The pain is relieved by Nothing. She has tried antacids and proton pump inhibitors for the symptoms. The treatment provided no relief. Prior diagnostic workup includes CT scan.     05/18/2024 CT Abdomen Pelvis with IV Contrast  Hepatomegaly.  Right hepatic lobe 1.9 cm hypodense lesion not consistent with a simple cyst     Past Medical History:   Diagnosis Date    Abnormal Pap smear of cervix         Past Surgical History:   Procedure Laterality Date    CERVICAL BIOPSY  W/ LOOP ELECTRODE EXCISION      COLPOSCOPY          Family History   Problem Relation Name Age of Onset    Colon cancer Maternal Uncle         Social History     Tobacco Use   Smoking Status Never   Smokeless Tobacco Never       Social History     Social History Narrative    Not on file       Review of patient's allergies indicates:  No Known Allergies     Review of Systems   Respiratory:  Negative for chest tightness and shortness of breath.    Cardiovascular:  Negative for chest pain and palpitations.   Gastrointestinal:  " Positive for abdominal pain and nausea. Negative for constipation, hematochezia and vomiting.   Genitourinary:  Negative for dysuria, frequency, hematuria and pelvic pain.   Neurological:  Positive for headaches. Negative for dizziness and light-headedness.   Psychiatric/Behavioral:  The patient is nervous/anxious.          Objective:      Vitals:    05/22/24 1624 05/22/24 1650   BP: (!) 144/98 132/78   BP Location: Left arm Left arm   Patient Position: Sitting Sitting   BP Method: Small (Automatic) Large (Automatic)   Pulse: 79    Temp: 98.6 °F (37 °C)    TempSrc: Oral    SpO2: 100%    Weight: 59.3 kg (130 lb 11.7 oz)    Height: 5' 3" (1.6 m)       Physical Exam  Pulmonary:      Effort: Respiratory distress present.   Abdominal:      General: Bowel sounds are normal. There is no distension.      Tenderness: There is abdominal tenderness. There is no guarding.   Neurological:      Mental Status: She is alert and oriented to person, place, and time.   Psychiatric:         Mood and Affect: Affect is tearful.         Speech: Speech is delayed.         Behavior: Behavior is cooperative.         Assessment:       1. Abnormal CT of the abdomen    2. LLQ abdominal pain    3. Umbilical hernia without obstruction and without gangrene    4. Hepatic lesion    5. Liver disease, unspecified    6. Does not have primary care provider    7. Screening cholesterol level    8. Encounter for hepatitis C screening test for low risk patient    9. Screening for HIV (human immunodeficiency virus)    10. Screening for diabetes mellitus        Plan:       Abnormal CT of the abdomen  Review of imaging on 05/18/2024    LLQ abdominal pain  Schedule with gastroenterology 05/27/2024.  Reports no relief with oral Toradol and Tylenol.       -     H. PYLORI ANTIBODY, IGG; Future; Expected date: 05/22/2024  -     ketorolac injection 30 mg    Umbilical hernia without obstruction and without gangrene  Comments:  05/18/2024 CT Abdomen Pelvis with IV " Contrast    Hepatic lesion  Comments:  05/18/2024 CT Abdomen Pelvis with IV Contrast  upper right hepatic lobe and 1.6 cm lesion  Orders:  -     MRI Abdomen W WO Contrast; Future; Expected date: 05/22/2024    Liver disease, unspecified  -     MRI Abdomen W WO Contrast; Future; Expected date: 05/22/2024    Does not have primary care provider  Plans to establish care with PCP in the future.     Screening cholesterol level  -     LIPID PANEL; Future; Expected date: 05/22/2024    Encounter for hepatitis C screening test for low risk patient  -     Hepatitis C Antibody; Future; Expected date: 05/22/2024    Screening for HIV (human immunodeficiency virus)  -     HIV 1/2 Ag/Ab (4th Gen); Future; Expected date: 05/22/2024    Screening for diabetes mellitus  -     Hemoglobin A1C; Future; Expected date: 05/22/2024       Health maintenance review/updated, refuses immunizations at this time.  RTC in one month to follow up abdominal pain, labs and imaging.    Medication List with Changes/Refills   Current Medications    BUSPIRONE (BUSPAR) 5 MG TAB    Take 5 mg by mouth 2 (two) times daily.    DICLOFENAC POTASSIUM (LOFENA) 25 MG TAB    Take 2 tablets by mouth 3 (three) times daily as needed (dysmenorrhea).    DROSPIRENONE-ETHINYL ESTRADIOL (NBA) 3-0.02 MG PER TABLET    Take 1 tablet by mouth once daily.    METHYLPHENIDATE HCL (RITALIN) 5 MG TABLET    Take 5 mg by mouth 2 (two) times daily.   Discontinued Medications    BUPROPION (WELLBUTRIN XL) 150 MG TB24 TABLET    Take 150 mg by mouth.    CHOLECALCIFEROL, VITAMIN D3, 10 MCG (400 UNIT) CAP CAPSULE    Take 2 tablets by mouth once daily.    CLONAZEPAM (KLONOPIN) 0.5 MG TABLET    Take 0.5 mg by mouth 2 (two) times daily as needed.    HYDROCODONE-ACETAMINOPHEN (NORCO) 5-325 MG PER TABLET    Take 1 tablet by mouth every 6 (six) hours as needed for Pain.    HYDROCODONE-ACETAMINOPHEN (NORCO) 5-325 MG PER TABLET    Take 1 tablet by mouth every 6 (six) hours as needed for Pain.     METHOCARBAMOL (ROBAXIN) 500 MG TAB    Take 1 tablet (500 mg total) by mouth 4 (four) times daily as needed (cramping).    METHYLPHENIDATE HCL (RITALIN) 10 MG TABLET    Take 10 mg by mouth 2 (two) times daily.    NEOMYCIN-POLYMYXIN-HYDROCORTISONE (CORTISPORIN) OTIC SOLUTION        NORETHINDRONE-ETHINYL ESTRADIOL (MICROGESTIN 1/20) 1-20 MG-MCG PER TABLET    Take 1 tablet by mouth once daily.    ONDANSETRON (ZOFRAN) 4 MG TABLET    Take 1 tablet (4 mg total) by mouth every 6 (six) hours.    ONDANSETRON (ZOFRAN-ODT) 4 MG TBDL    Take 1 tablet (4 mg total) by mouth every 8 (eight) hours as needed.        Follow up in about 1 month (around 6/22/2024), or if symptoms worsen or fail to improve, for Ana Lion, MSN, APRN, FNP-C.    I spent a total of 40 minutes on the day of the visit.This includes face to face time and non-face to face time preparing to see the patient (eg, review of tests), obtaining and/or reviewing separately obtained history, documenting clinical information in the electronic or other health record, independently interpreting results and communicating results to the patient/family/caregiver, or care coordinator.     JORGE Chaney, MSN, FNP-C

## 2024-05-22 NOTE — PATIENT INSTRUCTIONS
Please get your labs done at any [facility] facility that has a laboratory, you do not need an appointment.     I will communicate your laboratory and/or imaging results with you through your [facility] account that was set up through the portal, it was a pleasure meeting and taking care of you today.

## 2024-05-23 ENCOUNTER — LAB VISIT (OUTPATIENT)
Dept: LAB | Facility: OTHER | Age: 29
End: 2024-05-23
Attending: NURSE PRACTITIONER
Payer: COMMERCIAL

## 2024-05-23 DIAGNOSIS — R10.32 LLQ ABDOMINAL PAIN: ICD-10-CM

## 2024-05-23 DIAGNOSIS — Z13.1 SCREENING FOR DIABETES MELLITUS: ICD-10-CM

## 2024-05-23 DIAGNOSIS — Z11.4 SCREENING FOR HIV (HUMAN IMMUNODEFICIENCY VIRUS): ICD-10-CM

## 2024-05-23 DIAGNOSIS — Z11.59 ENCOUNTER FOR HEPATITIS C SCREENING TEST FOR LOW RISK PATIENT: ICD-10-CM

## 2024-05-23 DIAGNOSIS — Z13.220 SCREENING CHOLESTEROL LEVEL: ICD-10-CM

## 2024-05-23 LAB
CHOLEST SERPL-MCNC: 182 MG/DL (ref 120–199)
CHOLEST/HDLC SERPL: 3.4 {RATIO} (ref 2–5)
ESTIMATED AVG GLUCOSE: 91 MG/DL (ref 68–131)
HBA1C MFR BLD: 4.8 % (ref 4–5.6)
HCV AB SERPL QL IA: NEGATIVE
HDLC SERPL-MCNC: 53 MG/DL (ref 40–75)
HDLC SERPL: 29.1 % (ref 20–50)
HIV 1+2 AB+HIV1 P24 AG SERPL QL IA: NEGATIVE
LDLC SERPL CALC-MCNC: 107.6 MG/DL (ref 63–159)
NONHDLC SERPL-MCNC: 129 MG/DL
TRIGL SERPL-MCNC: 107 MG/DL (ref 30–150)

## 2024-05-23 PROCEDURE — 87389 HIV-1 AG W/HIV-1&-2 AB AG IA: CPT | Performed by: NURSE PRACTITIONER

## 2024-05-23 PROCEDURE — 86677 HELICOBACTER PYLORI ANTIBODY: CPT | Performed by: NURSE PRACTITIONER

## 2024-05-23 PROCEDURE — 86803 HEPATITIS C AB TEST: CPT | Performed by: NURSE PRACTITIONER

## 2024-05-23 PROCEDURE — 36415 COLL VENOUS BLD VENIPUNCTURE: CPT | Performed by: NURSE PRACTITIONER

## 2024-05-23 PROCEDURE — 80061 LIPID PANEL: CPT | Performed by: NURSE PRACTITIONER

## 2024-05-23 PROCEDURE — 83036 HEMOGLOBIN GLYCOSYLATED A1C: CPT | Performed by: NURSE PRACTITIONER

## 2024-05-24 LAB — H PYLORI IGG SERPL QL IA: NEGATIVE

## 2024-05-25 ENCOUNTER — HOSPITAL ENCOUNTER (OUTPATIENT)
Dept: RADIOLOGY | Facility: HOSPITAL | Age: 29
Discharge: HOME | End: 2024-05-25
Attending: NURSE PRACTITIONER
Payer: COMMERCIAL

## 2024-05-25 DIAGNOSIS — K76.9 HEPATIC LESION: ICD-10-CM

## 2024-05-25 DIAGNOSIS — K76.9 LIVER DISEASE, UNSPECIFIED: ICD-10-CM

## 2024-05-25 PROCEDURE — 74183 MRI ABD W/O CNTR FLWD CNTR: CPT | Mod: TC

## 2024-05-25 PROCEDURE — 25500020 PHARM REV CODE 255: Performed by: NURSE PRACTITIONER

## 2024-05-25 PROCEDURE — A9585 GADOBUTROL INJECTION: HCPCS | Performed by: NURSE PRACTITIONER

## 2024-05-25 PROCEDURE — 74183 MRI ABD W/O CNTR FLWD CNTR: CPT | Mod: 26,,, | Performed by: RADIOLOGY

## 2024-05-25 RX ORDER — GADOBUTROL 604.72 MG/ML
10 INJECTION INTRAVENOUS
Status: COMPLETED | OUTPATIENT
Start: 2024-05-25 | End: 2024-05-25

## 2024-05-25 RX ADMIN — GADOBUTROL 10 ML: 604.72 INJECTION INTRAVENOUS at 04:05

## 2024-05-27 ENCOUNTER — TELEPHONE (OUTPATIENT)
Dept: ENDOSCOPY | Facility: HOSPITAL | Age: 29
End: 2024-05-27
Payer: COMMERCIAL

## 2024-05-27 ENCOUNTER — OFFICE VISIT (OUTPATIENT)
Dept: INTERNAL MEDICINE | Facility: CLINIC | Age: 29
End: 2024-05-27
Payer: COMMERCIAL

## 2024-05-27 ENCOUNTER — OFFICE VISIT (OUTPATIENT)
Dept: GASTROENTEROLOGY | Facility: CLINIC | Age: 29
End: 2024-05-27
Payer: COMMERCIAL

## 2024-05-27 ENCOUNTER — LAB VISIT (OUTPATIENT)
Dept: LAB | Facility: HOSPITAL | Age: 29
End: 2024-05-27
Attending: INTERNAL MEDICINE
Payer: COMMERCIAL

## 2024-05-27 VITALS
HEIGHT: 63 IN | BODY MASS INDEX: 22.5 KG/M2 | DIASTOLIC BLOOD PRESSURE: 76 MMHG | BODY MASS INDEX: 22.61 KG/M2 | SYSTOLIC BLOOD PRESSURE: 130 MMHG | WEIGHT: 127 LBS | WEIGHT: 127.63 LBS | HEART RATE: 57 BPM | HEIGHT: 63 IN

## 2024-05-27 VITALS
BODY MASS INDEX: 22.58 KG/M2 | SYSTOLIC BLOOD PRESSURE: 104 MMHG | OXYGEN SATURATION: 98 % | HEART RATE: 63 BPM | WEIGHT: 127.44 LBS | HEIGHT: 63 IN | DIASTOLIC BLOOD PRESSURE: 70 MMHG

## 2024-05-27 DIAGNOSIS — Z12.11 SCREEN FOR COLON CANCER: Primary | ICD-10-CM

## 2024-05-27 DIAGNOSIS — Z80.8 FAMILY HISTORY OF MELANOMA: Primary | ICD-10-CM

## 2024-05-27 DIAGNOSIS — R10.9 ABDOMINAL PAIN, UNSPECIFIED ABDOMINAL LOCATION: ICD-10-CM

## 2024-05-27 DIAGNOSIS — R10.32 LEFT LOWER QUADRANT ABDOMINAL PAIN: ICD-10-CM

## 2024-05-27 DIAGNOSIS — R10.9 ABDOMINAL PAIN, UNSPECIFIED ABDOMINAL LOCATION: Primary | ICD-10-CM

## 2024-05-27 LAB
C4 SERPL-MCNC: 20 MG/DL (ref 11–44)
HAV IGG SER QL IA: NORMAL
HAV IGM SERPL QL IA: NORMAL
HBV CORE IGM SERPL QL IA: NORMAL
HBV SURFACE AG SERPL QL IA: NORMAL
HCV AB SERPL QL IA: NORMAL
LIPASE SERPL-CCNC: 46 U/L (ref 4–60)

## 2024-05-27 PROCEDURE — 99214 OFFICE O/P EST MOD 30 MIN: CPT | Mod: S$GLB,,, | Performed by: INTERNAL MEDICINE

## 2024-05-27 PROCEDURE — 80074 ACUTE HEPATITIS PANEL: CPT | Performed by: INTERNAL MEDICINE

## 2024-05-27 PROCEDURE — 86364 TISS TRNSGLTMNASE EA IG CLAS: CPT | Performed by: INTERNAL MEDICINE

## 2024-05-27 PROCEDURE — 3078F DIAST BP <80 MM HG: CPT | Mod: CPTII,S$GLB,, | Performed by: INTERNAL MEDICINE

## 2024-05-27 PROCEDURE — 1159F MED LIST DOCD IN RCRD: CPT | Mod: CPTII,S$GLB,, | Performed by: INTERNAL MEDICINE

## 2024-05-27 PROCEDURE — 83690 ASSAY OF LIPASE: CPT | Performed by: INTERNAL MEDICINE

## 2024-05-27 PROCEDURE — 86258 DGP ANTIBODY EACH IG CLASS: CPT | Performed by: INTERNAL MEDICINE

## 2024-05-27 PROCEDURE — 1160F RVW MEDS BY RX/DR IN RCRD: CPT | Mod: CPTII,S$GLB,, | Performed by: INTERNAL MEDICINE

## 2024-05-27 PROCEDURE — 3044F HG A1C LEVEL LT 7.0%: CPT | Mod: CPTII,S$GLB,, | Performed by: INTERNAL MEDICINE

## 2024-05-27 PROCEDURE — 3008F BODY MASS INDEX DOCD: CPT | Mod: CPTII,S$GLB,, | Performed by: INTERNAL MEDICINE

## 2024-05-27 PROCEDURE — 3075F SYST BP GE 130 - 139MM HG: CPT | Mod: CPTII,S$GLB,, | Performed by: INTERNAL MEDICINE

## 2024-05-27 PROCEDURE — 86160 COMPLEMENT ANTIGEN: CPT | Performed by: INTERNAL MEDICINE

## 2024-05-27 PROCEDURE — 99999 PR PBB SHADOW E&M-EST. PATIENT-LVL III: CPT | Mod: PBBFAC,,, | Performed by: INTERNAL MEDICINE

## 2024-05-27 PROCEDURE — 86160 COMPLEMENT ANTIGEN: CPT | Mod: 59 | Performed by: INTERNAL MEDICINE

## 2024-05-27 PROCEDURE — 86161 COMPLEMENT/FUNCTION ACTIVITY: CPT | Performed by: INTERNAL MEDICINE

## 2024-05-27 PROCEDURE — 3074F SYST BP LT 130 MM HG: CPT | Mod: CPTII,S$GLB,, | Performed by: INTERNAL MEDICINE

## 2024-05-27 PROCEDURE — 36415 COLL VENOUS BLD VENIPUNCTURE: CPT | Performed by: INTERNAL MEDICINE

## 2024-05-27 PROCEDURE — 99204 OFFICE O/P NEW MOD 45 MIN: CPT | Mod: S$GLB,,, | Performed by: INTERNAL MEDICINE

## 2024-05-27 PROCEDURE — 99999 PR PBB SHADOW E&M-EST. PATIENT-LVL IV: CPT | Mod: PBBFAC,,, | Performed by: INTERNAL MEDICINE

## 2024-05-27 PROCEDURE — 86706 HEP B SURFACE ANTIBODY: CPT | Performed by: INTERNAL MEDICINE

## 2024-05-27 PROCEDURE — 86790 VIRUS ANTIBODY NOS: CPT | Performed by: INTERNAL MEDICINE

## 2024-05-27 RX ORDER — HYDROCODONE BITARTRATE AND ACETAMINOPHEN 5; 325 MG/1; MG/1
1 TABLET ORAL EVERY 6 HOURS PRN
Qty: 10 TABLET | Refills: 0 | Status: SHIPPED | OUTPATIENT
Start: 2024-05-27

## 2024-05-27 RX ORDER — POLYETHYLENE GLYCOL 3350, SODIUM SULFATE, POTASSIUM CHLORIDE, MAGNESIUM SULFATE, AND SODIUM CHLORIDE FOR ORAL SOLUTION 178.7-7.3G
1 KIT ORAL DAILY
Qty: 2 EACH | Refills: 0 | Status: SHIPPED | OUTPATIENT
Start: 2024-05-27 | End: 2024-05-29

## 2024-05-27 NOTE — PROGRESS NOTES
[facility] Consultation Note    Reason for Consult:  The encounter diagnosis was Abdominal pain, unspecified abdominal location.    PCP:   Bety, Primary Doctor   [address] [facility] [place] [address]    Referring MD:  Leonardo Cabrera Md  [address] [place] [address]    Initial History of Present Illness (HPI):  This is a 29 y.o. female here for evaluation of new onset left lower quadrant pain for the last several weeks.  She has had an episode similar to this a few months ago she has had an extensive evaluation including abdominal ultrasound pelvic ultrasound CT of the abdomen pelvis with contrast and an MRI of the abdomen with no etiology found she does have a family history of colon cancer her mom's brother at age 40 no other family members with colon cancer nobody with advanced colon adenomas polyps no FAP no attenuated FAP no MA P no Ramirez syndrome nobody with celiac sprue or inflammatory bowel disease but patient has been on a gluten free diet for many years she says she is gluten intolerant at the least.  No history of any abdominal trauma she is maybe lost about 10 lb since these episodes have started did have an episode of vomiting several months ago but it was short-lived maybe a little blood in the vomitus she had a little bit of red blood in the stool right after this recent ER visit with mucus.  No flushing no wheezing no palpitations no new medication she did recently get started after her abdominal pain by gyn on birth control thinking this could potentially be endometriosis she is being followed closely by gyn.  No family history of esophageal or stomach cancer nobody with small-bowel cancer nobody with liver cancer nobody with pancreatitis or pancreatic cancer nobody with ovarian uterine kidney bladder or ureter cancer no FAP no attenuated FAP no MA P no Ramirez syndrome.    Abdominal pain - as above  Reflux -as above  Dysphagia - no   Bowel  "habits - once daily  GI bleeding - none  NSAID usage - occasional    Interval HPI 05/27/2024:  The patient's last visit with me was on Visit date not found.      ROS:  Constitutional: No fevers, chills, as above weight loss  ENT:  As above heartburn no dysphagia no odynophagia no hoarseness  CV: No chest pain, no palpitation  Pulm: No cough, No shortness of breath, no wheezing  Ophtho: No vision changes  GI: see HPI  Derm: No rash, no itching  Heme: No lymphadenopathy, No easy bruising  MSK: No significant arthritis  : No dysuria, No hematuria  Endo: No hot or cold intolerance  Neuro: No syncope, No seizure, no strokes  Psych: No uncontrolled anxiety, No uncontrolled depression    Medical History:  has a past medical history of Abnormal Pap smear of cervix.    Surgical History:  has a past surgical history that includes Cervical biopsy w/ loop electrode excision and Colposcopy.    Family History: family history includes Colon cancer in her maternal uncle..     Social History:  reports that she has never smoked. She has never used smokeless tobacco. She reports current alcohol use. She reports that she does not use drugs.    Review of patient's allergies indicates:  No Known Allergies    Medication List with Changes/Refills   Current Medications    BUSPIRONE (BUSPAR) 5 MG TAB    Take 5 mg by mouth 2 (two) times daily.    DICLOFENAC POTASSIUM (LOFENA) 25 MG TAB    Take 2 tablets by mouth 3 (three) times daily as needed (dysmenorrhea).    DROSPIRENONE-ETHINYL ESTRADIOL (NBA) 3-0.02 MG PER TABLET    Take 1 tablet by mouth once daily.    METHYLPHENIDATE HCL (RITALIN) 5 MG TABLET    Take 5 mg by mouth 2 (two) times daily.         Objective Findings:    Vital Signs:  /76   Pulse (!) 57   Ht 5' 3" (1.6 m)   Wt 57.9 kg (127 lb 10.3 oz)   LMP 05/15/2024   BMI 22.61 kg/m²   Body mass index is 22.61 kg/m².    Physical Exam:  General Appearance: Well appearing in no acute distress  Eyes:    No scleral icterus  ENT:  " No lesions or masses   Lungs: CTA bilaterally, no wheezes, no rhonchi, no rales  Heart:  S1, S2 normal, no murmurs heard  Abdomen:  Non distended, soft, no guarding, no rebound, mild left lower quadrant tenderness, no appreciated ascites, no bruits, no hepatosplenomegaly,  No CVA tenderness, no appreciated hernias, no Larry sign, no McBurney point tenderness  Musculoskeletal:  No major joint deformities  Skin: No petechiae or rash on exposed skin areas  Neurologic:  Alert and oriented x4  Psychiatric:  Normal speech mentation and affect    Labs:  Lab Results   Component Value Date    WBC 5.59 05/18/2024    HGB 12.1 05/18/2024    HCT 36.9 (L) 05/18/2024     05/18/2024    CHOL 182 05/23/2024    TRIG 107 05/23/2024    HDL 53 05/23/2024    ALT 12 05/18/2024    AST 17 05/18/2024     05/18/2024    K 3.8 05/18/2024     05/18/2024    CREATININE 0.8 05/18/2024    BUN 11 05/18/2024    CO2 24 05/18/2024    HGBA1C 4.8 05/23/2024         Medical Decision Making:  Lab work reviewed  Abdominal ultrasound reviewed  Abdominal and pelvis CT scan report images personally reviewed by myself  MRI scan results reviewed  Pelvic ultrasound results reviewed  EGD colonoscopy talk given restricted diet talk given bowel prep talk given  Possibility of endometriosis talk given    Assessment:  1. Abdominal pain, unspecified abdominal location    2.      Dyspepsia  3.      Episode of hematemesis  4.      Episode of hematochezia      Recommendations:  1. Lab work today  2. Referral to endoscopy schedulers for EGD colonoscopy  3. Patient is followed closely by gyn for possible endometriosis  4. Return GI clinic 3 months for follow-up    Follow up in about 3 months (around 8/27/2024).      Order summary:  Orders Placed This Encounter    Celiac Disease Panel    Hepatitis Panel, Acute    Hepatitis B Surface Antibody, Qual/Quant    HEPATITIS A ANTIBODY, IGG    C4 COMPLEMENT    C1 ESTERASE INHIBITOR, FUNCTIONAL    C1 ESTERASE  INHIBITOR PANEL    Lipase         Thank you so much for allowing me to participate in the care of Devora Agustin Jakesoniyaamador Ardon MD    DISCLAIMER: This note was prepared with [employer] voice recognition transcription software. Garbled syntax, mangled or inadvertent pronouns, and other bizarre constructions may be attributed to that software system. While efforts were made to correct any mistakes made by this voice recognition program, some errors and/or omissions may remain in the note that were missed when the note was originally created.

## 2024-05-27 NOTE — Clinical Note
"Procedure: EGD/Colonoscopy  Diagnosis: Abdominal pain  Procedure Timin-6 weeks  *If within 4 weeks selected, please didi as high priority*  *If greater than 12 weeks, please select "5-12 weeks" and delay sending until 3 months prior to requested date*  Location: [address]  Additional Scheduling Information: No scheduling concerns  Prep Specifications:Standard prep  Is the patient taking a GLP-1 Agonist:no  Have you attached a patient to this message: yes "

## 2024-05-27 NOTE — PROGRESS NOTES
"GENERAL GI PATIENT INTAKE:    COVID symptoms in the last 7 days (runny nose, sore throat, congestion, cough, fever): No  PCP: No, Primary Doctor  If not PCP-  number given to establish [phone]: N/A    ALLERGIES REVIEWED:  N/A    CHIEF COMPLAINT:    Chief Complaint   Patient presents with    GI Problem       VITAL SIGNS:  /76   Pulse (!) 57   Ht 5' 3" (1.6 m)   Wt 57.9 kg (127 lb 10.3 oz)   LMP 05/15/2024   BMI 22.61 kg/m²      Change in medical, surgical, family or social history: No      REVIEWED MEDICATION LIST RECONCILED INCLUDING ABOVE MEDS:  Yes     "

## 2024-05-27 NOTE — PROGRESS NOTES
"Subjective:       Patient ID: Devora Meeks is a 29 y.o. female.    Chief Complaint: Follow-up    HPI  29 y.o. female here to establish primary care    LLQ abdominal pain.   Saw Dr. Ardon just prior to this appt and labs in process. Plan for EGD/colonoscopy.   Followed by gyn for possible endometriosis. OCP started on 5/16.   Had similar pain prior and thought ovarian cyst ruptured.     Seen by KEITH Lion on 5/22.  H pylori Igg negative.   MRI 5/25/24: hepatic hemangiomas  CT a/p 5/18/24: nothing to explain sx    No prior abdominal surgeries. No appendectomy nor cholecystectomy.    Mom has had melanoma and BCC.  Brother had skin cancer in his 30s (non melanoma; unsure type - he was in a study because it was a rare type.)    [employer]    Ritalin 5mg  Buspar 5mg bid  Dr. Rey  Review of Systems    Objective:   /70 (BP Location: Right arm, Patient Position: Sitting, BP Method: Medium (Manual))   Pulse 63   Ht 5' 3" (1.6 m)   Wt 57.8 kg (127 lb 6.8 oz)   LMP 05/15/2024   SpO2 98%   BMI 22.57 kg/m²      Physical Exam    Assessment:       1. Family history of melanoma    2. Left lower quadrant abdominal pain        Plan:       Family history of melanoma  -     Ambulatory referral/consult to Dermatology; Future; Expected date: 06/03/2024    Left lower quadrant abdominal pain  -    undergoing work up with GI and gyn.   If not GI source - possibly Endometriosis?  -     HYDROcodone-acetaminophen (NORCO) 5-325 mg per tablet; Take 1 tablet by mouth every 6 (six) hours as needed for Pain.  Dispense: 10 tablet; Refill: 0  To be used sparingly for severe pain.           You are up to date for your primary preventive health care, and there are no reminders at this time.     derm  "

## 2024-05-27 NOTE — Clinical Note
GI MA team please schedule patient for 2nd floor lab work today  Please have patient see endoscopy schedulers to schedule EGD colonoscopy  Return GI clinic 3 months for follow-up

## 2024-05-27 NOTE — TELEPHONE ENCOUNTER
"----- Message from Crispin Ardon MD sent at 2024  8:30 AM CDT -----  Procedure: EGD/Colonoscopy    Diagnosis: Abdominal pain    Procedure Timin-6 weeks    #If within 4 weeks selected, please didi as high priority#    #If greater than 12 weeks, please select "5-12 weeks" and delay sending until 3 months prior to requested date#    Location: [address]    Additional Scheduling Information: No scheduling concerns    Prep Specifications:Standard prep    Is the patient taking a GLP-1 Agonist:no    Have you attached a patient to this message: yes   "

## 2024-05-27 NOTE — TELEPHONE ENCOUNTER
Spoke to Karuna to schedule procedure(s) Colonoscopy/EGD       Physician to perform procedure(s) Dr. AIYANA Ardon  Date of Procedure (s) 8/6/24  Arrival Time 12:45 PM  Time of Procedure(s) 1:45 PM   Location of Procedure(s) [place] 4th Floor  Type of Rx Prep sent to patient: [employer]ave  Instructions provided to patient via [facility]/copy in hand    Patient was informed on the following information and verbalized understanding. Screening questionnaire reviewed with patient and complete. If procedure requires anesthesia, a responsible adult needs to be present to accompany the patient home, patient cannot drive after receiving anesthesia. Appointment details are tentative, especially check-in time. Patient will receive a prep-op call 7 days prior to confirm check-in time for procedure. If applicable the patient should contact their pharmacy to verify Rx for procedure prep is ready for pick-up. Patient was advised to call the scheduling department at [phone] if pharmacy states no Rx is available. Patient was advised to call the endoscopy scheduling department if any questions or concerns arise.       Endoscopy Scheduling Department

## 2024-05-27 NOTE — PATIENT INSTRUCTIONS
"Procedure: EGD/Colonoscopy    Diagnosis: Abdominal pain    Procedure Timin-6 weeks    *If within 4 weeks selected, please didi as high priority*    *If greater than 12 weeks, please select "5-12 weeks" and delay sending until 3 months prior to requested date*    Location: [address]    Additional Scheduling Information: No scheduling concerns    Prep Specifications:Standard prep    Is the patient taking a GLP-1 Agonist:no    Have you attached a patient to this message: yes   "

## 2024-05-28 ENCOUNTER — OFFICE VISIT (OUTPATIENT)
Dept: DERMATOLOGY | Facility: CLINIC | Age: 29
End: 2024-05-28
Payer: COMMERCIAL

## 2024-05-28 DIAGNOSIS — Z12.83 SCREENING EXAM FOR SKIN CANCER: ICD-10-CM

## 2024-05-28 DIAGNOSIS — D22.9 MULTIPLE BENIGN NEVI: Primary | ICD-10-CM

## 2024-05-28 DIAGNOSIS — Z80.8 FAMILY HISTORY OF MELANOMA: ICD-10-CM

## 2024-05-28 DIAGNOSIS — L81.4 LENTIGO: ICD-10-CM

## 2024-05-28 PROCEDURE — 1160F RVW MEDS BY RX/DR IN RCRD: CPT | Mod: CPTII,S$GLB,, | Performed by: STUDENT IN AN ORGANIZED HEALTH CARE EDUCATION/TRAINING PROGRAM

## 2024-05-28 PROCEDURE — 99203 OFFICE O/P NEW LOW 30 MIN: CPT | Mod: S$GLB,,, | Performed by: STUDENT IN AN ORGANIZED HEALTH CARE EDUCATION/TRAINING PROGRAM

## 2024-05-28 PROCEDURE — 3044F HG A1C LEVEL LT 7.0%: CPT | Mod: CPTII,S$GLB,, | Performed by: STUDENT IN AN ORGANIZED HEALTH CARE EDUCATION/TRAINING PROGRAM

## 2024-05-28 PROCEDURE — 1159F MED LIST DOCD IN RCRD: CPT | Mod: CPTII,S$GLB,, | Performed by: STUDENT IN AN ORGANIZED HEALTH CARE EDUCATION/TRAINING PROGRAM

## 2024-05-28 PROCEDURE — 99999 PR PBB SHADOW E&M-EST. PATIENT-LVL III: CPT | Mod: PBBFAC,,, | Performed by: STUDENT IN AN ORGANIZED HEALTH CARE EDUCATION/TRAINING PROGRAM

## 2024-05-28 NOTE — PROGRESS NOTES
Subjective:      Patient ID:  Devora Meeks is a 29 y.o. female who presents for   Chief Complaint   Patient presents with    Skin Check     tbse     Pt here today for TBSE    Patient is here today for a skin check.   Pt has a history of  moderate sun exposure in the past.   Pt recalls several blistering sunburns in the past- yes, once  Pt has history of tanning bed use- no  Pt has  had moles removed in the past- no  Pt has history of melanoma in first degree relatives-  yes, mother, brother, grandfather    Mother hx of BCC, SCC  Uncle stage 4 rectal/colon cancer SCC        Review of Systems   Skin:  Positive for daily sunscreen use and activity-related sunscreen use. Negative for recent sunburn.   Hematologic/Lymphatic: Bruises/bleeds easily.       Objective:   Physical Exam   Constitutional: She appears well-developed and well-nourished. No distress.   Neurological: She is alert and oriented to person, place, and time. She is not disoriented.   Psychiatric: She has a normal mood and affect.   Skin:   Areas Examined (abnormalities noted in diagram):   Scalp / Hair Palpated and Inspected  Head / Face Inspection Performed  Neck Inspection Performed  Chest / Axilla Inspection Performed  Abdomen Inspection Performed  Genitals / Buttocks / Groin Inspection Performed  Back Inspection Performed  RUE Inspected  LUE Inspection Performed  RLE Inspected  LLE Inspection Performed  Nails and Digits Inspection Performed                         Diagram Legend     Erythematous scaling macule/papule c/w actinic keratosis       Vascular papule c/w angioma      Pigmented verrucoid papule/plaque c/w seborrheic keratosis      Yellow umbilicated papule c/w sebaceous hyperplasia      Irregularly shaped tan macule c/w lentigo     1-2 mm smooth white papules consistent with Milia      Movable subcutaneous cyst with punctum c/w epidermal inclusion cyst      Subcutaneous movable cyst c/w pilar cyst      Firm pink to brown papule  c/w dermatofibroma      Pedunculated fleshy papule(s) c/w skin tag(s)      Evenly pigmented macule c/w junctional nevus     Mildly variegated pigmented, slightly irregular-bordered macule c/w mildly atypical nevus      Flesh colored to evenly pigmented papule c/w intradermal nevus       Pink pearly papule/plaque c/w basal cell carcinoma      Erythematous hyperkeratotic cursted plaque c/w SCC      Surgical scar with no sign of skin cancer recurrence      Open and closed comedones      Inflammatory papules and pustules      Verrucoid papule consistent consistent with wart     Erythematous eczematous patches and plaques     Dystrophic onycholytic nail with subungual debris c/w onychomycosis     Umbilicated papule    Erythematous-base heme-crusted tan verrucoid plaque consistent with inflamed seborrheic keratosis     Erythematous Silvery Scaling Plaque c/w Psoriasis     See annotation      Assessment / Plan:        Multiple benign nevi  Discussed ABCDE's of nevi.  Monitor for new mole or moles that are becoming bigger, darker, irritated, or developing irregular borders. Instructed patient to observe lesion(s) for changes and follow up in clinic if changes are noted. Patient to monitor skin at home for new or changing lesions.     Lentigo  Reassurance given to patient. No treatment is necessary.     Family history of melanoma  Screening exam for skin cancer  Total body skin examination performed today including at least 12 points as noted in physical examination. No lesions suspicious for malignancy noted.    Recommend daily sun protection/avoidance, use of at least SPF 30, broad spectrum sunscreen (OTC drug), skin self examinations, and routine physician surveillance to optimize early detection         Follow up in about 1 year (around 5/28/2025) for TBSE.

## 2024-05-28 NOTE — PATIENT INSTRUCTIONS
Sunscreen Guidelines  Sunscreen protects your skin by absorbing and reflecting ultraviolet rays. All sunscreens have a sun protection factor (SPF) rating that indicates how long a sunscreen will remain effective on the skin.    Why protect your skin?  The sun's rays are composed of many different wavelengths, including UVA, UVB, and visible light that each affect the skin differently.    UVB: sunburn, photoaging, skin cancer (melanoma, basal cell, and squamous cell carcinomas) and modulation of the skin's immune system.    UVA: similar to above but thought to contribute more to aging; at the same dose of UVB it is less powerful however the sun has 10-20 times the levels of UVA as compared with UVB.  Visible light: implicated in causing unwanted darkening of skin, such as melasma and post-inflammatory hyperpigmentation in darker skin types     If I have dark skin, do I need to worry about the sun?    More darkly pigmented skin is more protected against UV-induced skin cancer, sunburn, and photoaging, though may still suffer from sun-related conditions, including melasma, hyperpigmentation, and other dark spots.    Sun avoidance  As a general rule, stay out of the sun as much as possible between 10 a.m. - 4 p.m.    Download the EPA UV index rodrick to track the UV index by hour in your zip code.      Which sunscreen should I choose?  The best sunscreen to use varies by individual. The one that feels best on your skin and fits your lifestyle will be the one you will likely use most regularly.   Active ingredients of sunscreen vary by , and may be a chemical (such as avobenzone or oxybenzone) or physical agent (such as zinc oxide or titanium dioxide). I recommend a physical agent.  A water-resistant sunscreen is one that maintains the SPF level after 40 minutes of water exposure. A very water-resistant sunscreen maintains the SPF level after 80 minutes of water exposure.    Sunscreen: this is the last layer in  "sun protection   Be generous: 1 shot glass of sunscreen for your body, ½ teaspoon for your face/neck  Reapply every 2 hours  Broad spectrum (provides UVA/UVB protection), SPF 50 or above  Avoid spray sunscreens: less effective and have been found to contain benzene (carcinogen)    Sun protective clothing  Although sunscreen helps minimize sun damage, no sunscreen completely blocks all wavelengths of UV light. Wearing sun protective clothing such as hats, rashguards or swim shirts, and long sleeves and/or pants, as well as avoiding sun exposure from 10 a.m. to 4 p.m. will help protect your skin from overexposure and minimize sun damage. Seek shade.  Long sleeved clothing, hats, and sunglasses: makes sun protection easier, more effective, and can even be more affordable, since sunscreen needs to be reapplied frequently.    Solumbra (www.sunprectautions.Wyle)  Amprius (www.AstroloMe)  Coolibar (www.TravelTipz.ru.Wyle)  Land's end (www.Cognitive Match)  Hats from [employer] (www.helenkaminski.com)    My Favorite Sunscreens:  Physical blockers: Can have a "white case" but in general are more effective  - Face: CeraVe tinted mineral sunscreen, Bare Minerals complexion rescue (20 shades), Elta MD (UV elements, UV physical, UV restore, etc), Tizo ultra zinc tinted, Cetaphil Sheer Mineral Face Liquid Sunscreen  - Body: Blue Lizard, Neutrogena Sheer Zinc, Eucerin Daily Protection, Aveeno Baby   "

## 2024-05-29 LAB — C1INH FUNCTIONAL/C1INH TOTAL MFR SERPL: >100 %

## 2024-05-30 DIAGNOSIS — D18.03 LIVER HEMANGIOMA: Primary | ICD-10-CM

## 2024-05-30 LAB
C1INH SERPL-MCNC: 19 MG/DL (ref 21–39)
GLIADIN PEPTIDE IGA SER-ACNC: <0.2 U/ML
GLIADIN PEPTIDE IGG SER-ACNC: <0.6 U/ML
HBV SURFACE AB SER QL IA: POSITIVE
HBV SURFACE AB SERPL IA-ACNC: 72 MIU/ML
IGA SERPL-MCNC: 139 MG/DL (ref 70–400)
TTG IGA SER-ACNC: <0.2 U/ML
TTG IGG SER-ACNC: <0.6 U/ML

## 2024-06-01 ENCOUNTER — PATIENT MESSAGE (OUTPATIENT)
Dept: GASTROENTEROLOGY | Facility: CLINIC | Age: 29
End: 2024-06-01
Payer: COMMERCIAL

## 2024-06-01 DIAGNOSIS — Z23 ENCOUNTER FOR VACCINATION: Primary | ICD-10-CM

## 2024-06-01 DIAGNOSIS — R89.9 ABNORMAL LABORATORY TEST RESULT: ICD-10-CM

## 2024-06-01 NOTE — PROGRESS NOTES
"Melanie please repeat C1 esterase inhibitor lab in 4 weeks orders placed    GI MA team - please tell patient that their Hepatitis A, B and C labs are negative but they have "No" immunity to them either hepatitis A and hepatitis-C.    She has immunity to viral hepatitis B which is a good thing    There is currently No vaccination yet for Hepatitis C.    There is vaccinations for Hepatitis A,  and Recommend the Hepatitis A vaccination series.     Hepatitis A vaccination series is a 2 part vaccine series - Day 1, and then again in 6-months from first one.    Orders were  placed.     Penny you have a very slight decrease in C1 esterase inhibitor very nonspecific let us repeat that the rest of your C1 esterase evaluation including C4 normal."

## 2024-06-04 ENCOUNTER — PATIENT MESSAGE (OUTPATIENT)
Dept: GASTROENTEROLOGY | Facility: CLINIC | Age: 29
End: 2024-06-04
Payer: COMMERCIAL

## 2024-06-10 ENCOUNTER — PATIENT MESSAGE (OUTPATIENT)
Dept: INTERNAL MEDICINE | Facility: CLINIC | Age: 29
End: 2024-06-10
Payer: COMMERCIAL

## 2024-06-20 ENCOUNTER — PATIENT OUTREACH (OUTPATIENT)
Dept: ADMINISTRATIVE | Facility: OTHER | Age: 29
End: 2024-06-20
Payer: COMMERCIAL

## 2024-06-20 NOTE — PROGRESS NOTES
CHW - Initial Contact    This Community Health Worker updated the Social Determinant of Health questionnaire with patient via telephone today.    Pt identified barriers of most importance are: No barriers reported.   Referrals to community agencies completed with patient/caregiver consent outside of [facility] include: No  Referrals were put through [facility] - no:   Support and Services:   Other information discussed the patient needs / wants help with: SDOH updated. Patient reported no barriers at this time, case will be closed.   Follow up required: No

## 2024-06-24 ENCOUNTER — TELEPHONE (OUTPATIENT)
Dept: OBSTETRICS AND GYNECOLOGY | Facility: CLINIC | Age: 29
End: 2024-06-24
Payer: COMMERCIAL

## 2024-06-24 ENCOUNTER — OFFICE VISIT (OUTPATIENT)
Dept: OBSTETRICS AND GYNECOLOGY | Facility: CLINIC | Age: 29
End: 2024-06-24
Attending: OBSTETRICS & GYNECOLOGY
Payer: COMMERCIAL

## 2024-06-24 VITALS
DIASTOLIC BLOOD PRESSURE: 80 MMHG | SYSTOLIC BLOOD PRESSURE: 120 MMHG | WEIGHT: 121 LBS | HEIGHT: 63 IN | BODY MASS INDEX: 21.44 KG/M2

## 2024-06-24 DIAGNOSIS — R10.32 LEFT LOWER QUADRANT ABDOMINAL PAIN: ICD-10-CM

## 2024-06-24 DIAGNOSIS — N94.6 DYSMENORRHEA: Primary | ICD-10-CM

## 2024-06-24 DIAGNOSIS — R10.2 PELVIC PAIN IN FEMALE: ICD-10-CM

## 2024-06-24 PROCEDURE — 3008F BODY MASS INDEX DOCD: CPT | Mod: CPTII,S$GLB,, | Performed by: OBSTETRICS & GYNECOLOGY

## 2024-06-24 PROCEDURE — 99213 OFFICE O/P EST LOW 20 MIN: CPT | Mod: S$GLB,,, | Performed by: OBSTETRICS & GYNECOLOGY

## 2024-06-24 PROCEDURE — 3074F SYST BP LT 130 MM HG: CPT | Mod: CPTII,S$GLB,, | Performed by: OBSTETRICS & GYNECOLOGY

## 2024-06-24 PROCEDURE — 3079F DIAST BP 80-89 MM HG: CPT | Mod: CPTII,S$GLB,, | Performed by: OBSTETRICS & GYNECOLOGY

## 2024-06-24 PROCEDURE — 99999 PR PBB SHADOW E&M-EST. PATIENT-LVL III: CPT | Mod: PBBFAC,,, | Performed by: OBSTETRICS & GYNECOLOGY

## 2024-06-24 RX ORDER — HYDROCODONE BITARTRATE AND ACETAMINOPHEN 5; 325 MG/1; MG/1
1 TABLET ORAL EVERY 6 HOURS PRN
Qty: 10 TABLET | Refills: 0 | Status: SHIPPED | OUTPATIENT
Start: 2024-06-24 | End: 2024-09-09 | Stop reason: SDUPTHER

## 2024-06-24 RX ORDER — KETOROLAC TROMETHAMINE 10 MG/1
10 TABLET, FILM COATED ORAL EVERY 6 HOURS
Qty: 60 TABLET | Refills: 0 | Status: SHIPPED | OUTPATIENT
Start: 2024-06-24 | End: 2024-07-09

## 2024-06-24 RX ORDER — DROSPIRENONE AND ETHINYL ESTRADIOL 0.02-3(28)
1 KIT ORAL DAILY
Qty: 90 TABLET | Refills: 4 | Status: SHIPPED | OUTPATIENT
Start: 2024-06-24 | End: 2024-09-09 | Stop reason: SDUPTHER

## 2024-06-24 NOTE — TELEPHONE ENCOUNTER
Pharmacy aware per  to have 3 month supply. Patient is not taking medication every 6 hours, only as needed    ----- Message from Yuri Spear sent at 6/24/2024  2:12 PM CDT -----  Regarding: meds  Pharmacy Calling to Clarify an RX:[facility]       Name of Caller:Pharmacist       Pharmacy Name       Prescription Name (TORADOL) 10 mg tablet      What do they need to clarify? Script is only good to use for 5 days per pharmacy   Please contact       Best Call Back Number [phone]         Additional Information:

## 2024-07-01 ENCOUNTER — LAB VISIT (OUTPATIENT)
Dept: LAB | Facility: HOSPITAL | Age: 29
End: 2024-07-01
Attending: INTERNAL MEDICINE
Payer: COMMERCIAL

## 2024-07-01 DIAGNOSIS — R89.9 ABNORMAL LABORATORY TEST RESULT: ICD-10-CM

## 2024-07-01 LAB — C4 SERPL-MCNC: 20 MG/DL (ref 11–44)

## 2024-07-01 PROCEDURE — 36415 COLL VENOUS BLD VENIPUNCTURE: CPT | Performed by: INTERNAL MEDICINE

## 2024-07-01 PROCEDURE — 86160 COMPLEMENT ANTIGEN: CPT | Mod: 59 | Performed by: INTERNAL MEDICINE

## 2024-07-01 PROCEDURE — 86161 COMPLEMENT/FUNCTION ACTIVITY: CPT | Performed by: INTERNAL MEDICINE

## 2024-07-01 PROCEDURE — 86160 COMPLEMENT ANTIGEN: CPT | Performed by: INTERNAL MEDICINE

## 2024-07-04 LAB — C1INH SERPL-MCNC: 20 MG/DL (ref 21–39)

## 2024-07-05 LAB — C1INH FUNCTIONAL/C1INH TOTAL MFR SERPL: >100 %

## 2024-07-21 ENCOUNTER — PATIENT MESSAGE (OUTPATIENT)
Dept: GASTROENTEROLOGY | Facility: CLINIC | Age: 29
End: 2024-07-21
Payer: COMMERCIAL

## 2024-07-21 DIAGNOSIS — R84.9 ABNORMAL RESPIRATORY LABORATORY RESULTS: Primary | ICD-10-CM

## 2024-07-29 ENCOUNTER — TELEPHONE (OUTPATIENT)
Dept: ENDOSCOPY | Facility: HOSPITAL | Age: 29
End: 2024-07-29
Payer: COMMERCIAL

## 2024-07-29 ENCOUNTER — TELEPHONE (OUTPATIENT)
Dept: GASTROENTEROLOGY | Facility: CLINIC | Age: 29
End: 2024-07-29
Payer: COMMERCIAL

## 2024-07-29 DIAGNOSIS — Z12.11 ENCOUNTER FOR SCREENING COLONOSCOPY FOR NON-HIGH-RISK PATIENT: Primary | ICD-10-CM

## 2024-07-29 RX ORDER — POLYETHYLENE GLYCOL 3350, SODIUM SULFATE, POTASSIUM CHLORIDE, MAGNESIUM SULFATE, AND SODIUM CHLORIDE FOR ORAL SOLUTION 178.7-7.3G
1 KIT ORAL DAILY
Qty: 2 EACH | Refills: 0 | Status: SHIPPED | OUTPATIENT
Start: 2024-07-29 | End: 2024-07-31

## 2024-07-29 NOTE — TELEPHONE ENCOUNTER
----- Message from Margo Brush sent at 7/29/2024  1:10 PM CDT -----  Regarding: refill  Contact: [phone]  Pt stated she never picked up the prep for her procedure next week. Pt asking to have it sent again so she can go pick it up. Pls call to discuss.    ..  [facility] [id number] [address]  Phone: [phone] Fax: [phone]

## 2024-07-31 ENCOUNTER — PATIENT MESSAGE (OUTPATIENT)
Dept: ENDOSCOPY | Facility: HOSPITAL | Age: 29
End: 2024-07-31
Payer: COMMERCIAL

## 2024-07-31 ENCOUNTER — TELEPHONE (OUTPATIENT)
Dept: ENDOSCOPY | Facility: HOSPITAL | Age: 29
End: 2024-07-31
Payer: COMMERCIAL

## 2024-07-31 ENCOUNTER — TELEPHONE (OUTPATIENT)
Dept: INTERNAL MEDICINE | Facility: CLINIC | Age: 29
End: 2024-07-31
Payer: COMMERCIAL

## 2024-07-31 NOTE — TELEPHONE ENCOUNTER
Spoke to pt for precall to confirm scheduled procedure(s) Colonoscopy/EGD     24  Date of Procedure (s)  verified 8/6/24  Arrival Time 1:00 PM verified.  Location of Procedure(s) [place] 4th Floor verified.    Pt denies taking blood thinning or glp1 meds    NPO status reinforced. Ok to continue clear liquids up until 4 hours prior to the Endoscopy procedure.   Pt confirmed receipt of Rx prep and prep instructions.  [facility]  Pt confirmed ride home after procedure.   If procedure requires anesthesia, a responsible adult needs to be present to accompany the patient home, patient cannot drive after receiving anesthesia.    Patient was informed on the following information and verbalized understanding. Precall Screening questionnaire reviewed  and completed with patient. Appointment details are tentative, including check-in time.  If you begin taking any blood thinning medications, injectable weight loss/diabetes medications (other than insulin) , or Adipex (Phentermine) please contact the endoscopy scheduling department listed below as soon as possible.  Patient was advised to call the endoscopy scheduling department if any questions or concerns arise. Pt verbalized understanding.      Endoscopy Scheduling Department

## 2024-07-31 NOTE — TELEPHONE ENCOUNTER
Called pt letting her know that I we do have an avaible slot for 8/1 at 11:30am. Slot is held waiting for respones   N/A

## 2024-08-06 ENCOUNTER — ANESTHESIA EVENT (OUTPATIENT)
Dept: ENDOSCOPY | Facility: HOSPITAL | Age: 29
End: 2024-08-06
Payer: COMMERCIAL

## 2024-08-06 ENCOUNTER — HOSPITAL ENCOUNTER (OUTPATIENT)
Facility: HOSPITAL | Age: 29
Discharge: HOME | End: 2024-08-06
Attending: INTERNAL MEDICINE | Admitting: INTERNAL MEDICINE
Payer: COMMERCIAL

## 2024-08-06 ENCOUNTER — ANESTHESIA (OUTPATIENT)
Dept: ENDOSCOPY | Facility: HOSPITAL | Age: 29
End: 2024-08-06
Payer: COMMERCIAL

## 2024-08-06 VITALS
RESPIRATION RATE: 17 BRPM | WEIGHT: 122 LBS | SYSTOLIC BLOOD PRESSURE: 104 MMHG | BODY MASS INDEX: 21.62 KG/M2 | TEMPERATURE: 98 F | HEIGHT: 63 IN | OXYGEN SATURATION: 100 % | HEART RATE: 50 BPM | DIASTOLIC BLOOD PRESSURE: 65 MMHG

## 2024-08-06 DIAGNOSIS — R10.9 ABDOMINAL PAIN: ICD-10-CM

## 2024-08-06 LAB
B-HCG UR QL: NEGATIVE
CTP QC/QA: YES

## 2024-08-06 PROCEDURE — 45385 COLONOSCOPY W/LESION REMOVAL: CPT | Performed by: INTERNAL MEDICINE

## 2024-08-06 PROCEDURE — 88305 TISSUE EXAM BY PATHOLOGIST: CPT | Performed by: PATHOLOGY

## 2024-08-06 PROCEDURE — 37000009 HC ANESTHESIA EA ADD 15 MINS: Performed by: INTERNAL MEDICINE

## 2024-08-06 PROCEDURE — 27201012 HC FORCEPS, HOT/COLD, DISP: Performed by: INTERNAL MEDICINE

## 2024-08-06 PROCEDURE — 27200997: Performed by: INTERNAL MEDICINE

## 2024-08-06 PROCEDURE — 25000003 PHARM REV CODE 250: Performed by: NURSE ANESTHETIST, CERTIFIED REGISTERED

## 2024-08-06 PROCEDURE — 45385 COLONOSCOPY W/LESION REMOVAL: CPT | Mod: ,,, | Performed by: INTERNAL MEDICINE

## 2024-08-06 PROCEDURE — 81025 URINE PREGNANCY TEST: CPT | Performed by: INTERNAL MEDICINE

## 2024-08-06 PROCEDURE — 43239 EGD BIOPSY SINGLE/MULTIPLE: CPT | Performed by: INTERNAL MEDICINE

## 2024-08-06 PROCEDURE — 27201089 HC SNARE, DISP (ANY): Performed by: INTERNAL MEDICINE

## 2024-08-06 PROCEDURE — 43239 EGD BIOPSY SINGLE/MULTIPLE: CPT | Mod: 51,,, | Performed by: INTERNAL MEDICINE

## 2024-08-06 PROCEDURE — 37000008 HC ANESTHESIA 1ST 15 MINUTES: Performed by: INTERNAL MEDICINE

## 2024-08-06 PROCEDURE — 63600175 PHARM REV CODE 636 W HCPCS: Performed by: NURSE ANESTHETIST, CERTIFIED REGISTERED

## 2024-08-06 RX ORDER — PROPOFOL 10 MG/ML
VIAL (ML) INTRAVENOUS
Status: DISCONTINUED | OUTPATIENT
Start: 2024-08-06 | End: 2024-08-06

## 2024-08-06 RX ORDER — SODIUM CHLORIDE 9 MG/ML
INJECTION, SOLUTION INTRAVENOUS CONTINUOUS
Status: DISCONTINUED | OUTPATIENT
Start: 2024-08-06 | End: 2024-08-06 | Stop reason: HOSPADM

## 2024-08-06 RX ORDER — LIDOCAINE HYDROCHLORIDE 20 MG/ML
INJECTION INTRAVENOUS
Status: DISCONTINUED | OUTPATIENT
Start: 2024-08-06 | End: 2024-08-06

## 2024-08-06 RX ADMIN — LIDOCAINE HYDROCHLORIDE 100 MG: 20 INJECTION INTRAVENOUS at 03:08

## 2024-08-06 RX ADMIN — SODIUM CHLORIDE: 0.9 INJECTION, SOLUTION INTRAVENOUS at 03:08

## 2024-08-06 RX ADMIN — PROPOFOL 150 MG: 10 INJECTION, EMULSION INTRAVENOUS at 03:08

## 2024-08-06 NOTE — PROVATION PATIENT INSTRUCTIONS
Discharge Summary/Instructions after an Endoscopic Procedure  Patient Name: Devora Meeks  Patient MRN: [id number]  Patient YOB: 1995 Tuesday, August 6, 2024  Crispin Ardon MD  Dear patient,  As a result of recent federal legislation (The Federal Cures Act), you may   receive lab or pathology results from your procedure in your [facility]   account before your physician is able to contact you. Your physician or   their representative will relay the results to you with their   recommendations at their soonest availability.  Thank you,  RESTRICTIONS:  During your procedure today, you received medications for sedation.  These   medications may affect your judgment, balance and coordination.  Therefore,   for 24 hours, you have the following restrictions:   - DO NOT drive a car, operate machinery, make legal/financial decisions,   sign important papers or drink alcohol.    ACTIVITY:  Today: no heavy lifting, straining or running due to procedural   sedation/anesthesia.  The following day: return to full activity including work.  DIET:  Eat and drink normally unless instructed otherwise.     TREATMENT FOR COMMON SIDE EFFECTS:  - Mild abdominal pain, nausea, belching, bloating or excessive gas:  rest,   eat lightly and use a heating pad.  - Sore Throat: treat with throat lozenges and/or gargle with warm salt   water.  - Because air was used during the procedure, expelling large amounts of air   from your rectum or belching is normal.  - If a bowel prep was taken, you may not have a bowel movement for 1-3 days.    This is normal.  SYMPTOMS TO WATCH FOR AND REPORT TO YOUR PHYSICIAN:  1. Abdominal pain or bloating, other than gas cramps.  2. Chest pain.  3. Back pain.  4. Signs of infection such as: chills or fever occurring within 24 hours   after the procedure.  5. Rectal bleeding, which would show as bright red, maroon, or black stools.   (A tablespoon of blood from the rectum is not serious, especially  if   hemorrhoids are present.)  6. Vomiting.  7. Weakness or dizziness.  GO DIRECTLY TO THE NEAREST EMERGENCY ROOM IF YOU HAVE ANY OF THE FOLLOWING:      Difficulty breathing              Chills and/or fever over 101 F   Persistent vomiting and/or vomiting blood   Severe abdominal pain   Severe chest pain   Black, tarry stools   Bleeding- more than one tablespoon   Any other symptom or condition that you feel may need urgent attention  Your doctor recommends these additional instructions:  If any biopsies were taken, your doctors clinic will contact you in 1 to 2   weeks with any results.  - Discharge patient to home.   - Await pathology results.   - Telephone endoscopist for pathology results in 3 weeks.   - Repeat colonoscopy in 5 years for surveillance based on pathology results.     - Return to GI clinic at the next available appointment.   - Return to primary care physician.   - The findings and recommendations were discussed with the patient.  For questions, problems or results please call your physician - Crispin Ardon MD at Work:  [phone].  [facility], EMERGENCY ROOM PHONE NUMBER: [phone]  IF A COMPLICATION OR EMERGENCY SITUATION ARISES AND YOU ARE UNABLE TO REACH   YOUR PHYSICIAN - GO DIRECTLY TO THE EMERGENCY ROOM.  Crispin Ardon MD  8/6/2024 4:08:07 PM  This report has been verified and signed electronically.  Dear patient,  As a result of recent federal legislation (The Federal Cures Act), you may   receive lab or pathology results from your procedure in your [facility]   account before your physician is able to contact you. Your physician or   their representative will relay the results to you with their   recommendations at their soonest availability.  Thank you,  PROVATION

## 2024-08-06 NOTE — H&P
[facility]  History & Physical    Subjective:      Chief Complaint/Reason for Admission:     This is a 29 y.o. female here for evaluation of new onset left lower quadrant pain for the last several weeks.  She has had an episode similar to this a few months ago she has had an extensive evaluation including abdominal ultrasound pelvic ultrasound CT of the abdomen pelvis with contrast and an MRI of the abdomen with no etiology found she does have a family history of colon cancer her mom's brother at age 40 no other family members with colon cancer nobody with advanced colon adenomas polyps no FAP no attenuated FAP no MA P no Ramirez syndrome nobody with celiac sprue or inflammatory bowel disease but patient has been on a gluten free diet for many years she says she is gluten intolerant at the least.  No history of any abdominal trauma she is maybe lost about 10 lb since these episodes have started did have an episode of vomiting several months ago but it was short-lived maybe a little blood in the vomitus she had a little bit of red blood in the stool right after this recent ER visit with mucus.  No flushing no wheezing no palpitations no new medication she did recently get started after her abdominal pain by gyn on birth control thinking this could potentially be endometriosis she is being followed closely by gyn.  No family history of esophageal or stomach cancer nobody with small-bowel cancer nobody with liver cancer nobody with pancreatitis or pancreatic cancer nobody with ovarian uterine kidney bladder or ureter cancer no FAP no attenuated FAP no MA P no Ramirez syndrome.     Devora Meeks is a 29 y.o. female.    Past Medical History:   Diagnosis Date    Abnormal Pap smear of cervix      Past Surgical History:   Procedure Laterality Date    CERVICAL BIOPSY  W/ LOOP ELECTRODE EXCISION N/A 2016    one abnormal after, clear since that time    COLPOSCOPY       Social History     Tobacco Use     Smoking status: Never    Smokeless tobacco: Never    Tobacco comments:     Occasional social smoking rarely   Substance Use Topics    Alcohol use: Yes     Comment: occasionally    Drug use: Never       PTA Medications   Medication Sig    busPIRone (BUSPAR) 5 MG Tab Take 5 mg by mouth 2 (two) times daily.    methylphenidate HCl (RITALIN) 5 MG tablet Take 5 mg by mouth 2 (two) times daily.    drospirenone-ethinyl estradioL (NBA) 3-0.02 mg per tablet Take 1 tablet by mouth once daily.    HYDROcodone-acetaminophen (NORCO) 5-325 mg per tablet Take 1 tablet by mouth every 6 (six) hours as needed for Pain.     Review of patient's allergies indicates:  No Known Allergies     Review of Systems   Constitutional:  Negative for fever.       Objective:      Vital Signs (Most Recent)  Temp: 97.9 °F (36.6 °C) (08/06/24 1345)  Pulse: 70 (08/06/24 1345)  Resp: 16 (08/06/24 1345)  BP: 131/77 (08/06/24 1345)  SpO2: 100 % (08/06/24 1345)    Vital Signs Range (Last 24H):  Temp:  [97.9 °F (36.6 °C)]   Pulse:  [70]   Resp:  [16]   BP: (131)/(77)   SpO2:  [100 %]     Physical Exam  Cardiovascular:      Rate and Rhythm: Normal rate.   Pulmonary:      Effort: Pulmonary effort is normal.   Neurological:      Mental Status: She is alert and oriented to person, place, and time.           Assessment:    This is a 29 y.o. female here for evaluation of new onset left lower quadrant pain for the last several weeks.  She has had an episode similar to this a few months ago she has had an extensive evaluation including abdominal ultrasound pelvic ultrasound CT of the abdomen pelvis with contrast and an MRI of the abdomen with no etiology found she does have a family history of colon cancer her mom's brother at age 40 no other family members with colon cancer nobody with advanced colon adenomas polyps no FAP no attenuated FAP no MA P no Ramirez syndrome nobody with celiac sprue or inflammatory bowel disease but patient has been on a gluten free diet  for many years she says she is gluten intolerant at the least.  No history of any abdominal trauma she is maybe lost about 10 lb since these episodes have started did have an episode of vomiting several months ago but it was short-lived maybe a little blood in the vomitus she had a little bit of red blood in the stool right after this recent ER visit with mucus.  No flushing no wheezing no palpitations no new medication she did recently get started after her abdominal pain by gyn on birth control thinking this could potentially be endometriosis she is being followed closely by gyn.  No family history of esophageal or stomach cancer nobody with small-bowel cancer nobody with liver cancer nobody with pancreatitis or pancreatic cancer nobody with ovarian uterine kidney bladder or ureter cancer no FAP no attenuated FAP no MA P no Ramirez syndrome.       Plan:        EGD an colonoscopy

## 2024-08-06 NOTE — PROVATION PATIENT INSTRUCTIONS
Discharge Summary/Instructions after an Endoscopic Procedure  Patient Name: Devora Meeks  Patient MRN: [id number]  Patient YOB: 1995 Tuesday, August 6, 2024  Crispin Ardon MD  Dear patient,  As a result of recent federal legislation (The Federal Cures Act), you may   receive lab or pathology results from your procedure in your [facility]   account before your physician is able to contact you. Your physician or   their representative will relay the results to you with their   recommendations at their soonest availability.  Thank you,  RESTRICTIONS:  During your procedure today, you received medications for sedation.  These   medications may affect your judgment, balance and coordination.  Therefore,   for 24 hours, you have the following restrictions:   - DO NOT drive a car, operate machinery, make legal/financial decisions,   sign important papers or drink alcohol.    ACTIVITY:  Today: no heavy lifting, straining or running due to procedural   sedation/anesthesia.  The following day: return to full activity including work.  DIET:  Eat and drink normally unless instructed otherwise.     TREATMENT FOR COMMON SIDE EFFECTS:  - Mild abdominal pain, nausea, belching, bloating or excessive gas:  rest,   eat lightly and use a heating pad.  - Sore Throat: treat with throat lozenges and/or gargle with warm salt   water.  - Because air was used during the procedure, expelling large amounts of air   from your rectum or belching is normal.  - If a bowel prep was taken, you may not have a bowel movement for 1-3 days.    This is normal.  SYMPTOMS TO WATCH FOR AND REPORT TO YOUR PHYSICIAN:  1. Abdominal pain or bloating, other than gas cramps.  2. Chest pain.  3. Back pain.  4. Signs of infection such as: chills or fever occurring within 24 hours   after the procedure.  5. Rectal bleeding, which would show as bright red, maroon, or black stools.   (A tablespoon of blood from the rectum is not serious, especially  if   hemorrhoids are present.)  6. Vomiting.  7. Weakness or dizziness.  GO DIRECTLY TO THE NEAREST EMERGENCY ROOM IF YOU HAVE ANY OF THE FOLLOWING:      Difficulty breathing              Chills and/or fever over 101 F   Persistent vomiting and/or vomiting blood   Severe abdominal pain   Severe chest pain   Black, tarry stools   Bleeding- more than one tablespoon   Any other symptom or condition that you feel may need urgent attention  Your doctor recommends these additional instructions:  If any biopsies were taken, your doctors clinic will contact you in 1 to 2   weeks with any results.  - Discharge patient to home.   - Await pathology results.   - Telephone endoscopist for pathology results in 3 weeks.   - Await pathology results.  For questions, problems or results please call your physician - Crispin Ardon MD at Work:  [phone].  [facility], EMERGENCY ROOM PHONE NUMBER: [phone]  IF A COMPLICATION OR EMERGENCY SITUATION ARISES AND YOU ARE UNABLE TO REACH   YOUR PHYSICIAN - GO DIRECTLY TO THE EMERGENCY ROOM.  Crispin Ardon MD  8/6/2024 4:09:14 PM  This report has been verified and signed electronically.  Dear patient,  As a result of recent federal legislation (The Federal Cures Act), you may   receive lab or pathology results from your procedure in your [facility]   account before your physician is able to contact you. Your physician or   their representative will relay the results to you with their   recommendations at their soonest availability.  Thank you,  PROVATION

## 2024-08-08 LAB
FINAL PATHOLOGIC DIAGNOSIS: NORMAL
GROSS: NORMAL
Lab: NORMAL

## 2024-08-11 ENCOUNTER — PATIENT MESSAGE (OUTPATIENT)
Dept: GASTROENTEROLOGY | Facility: CLINIC | Age: 29
End: 2024-08-11
Payer: COMMERCIAL

## 2024-08-11 NOTE — PROGRESS NOTES
Devora your EGD colonoscopy pathology is all benign no dysplasia    1. DUODENUM, BIOPSY:  Duodenal mucosa with no significant pathologic abnormality  Villous architecture is maintained    2. STOMACH, BIOPSY:  Gastric body and antral mucosa with mild chronic gastritis and reactive changes suggestive of proton pump inhibitor therapy  No evidence of intestinal metaplasia, dysplasia or malignancy  No evidence of Helicobacter pylori organisms on H&E stain    3. RECTUM, BIOPSY:  Mild hyperplastic changes   Comment: Interp By Evelina Salazar M.D., Signed on 08/08/2024

## 2024-08-22 ENCOUNTER — LAB VISIT (OUTPATIENT)
Dept: LAB | Facility: HOSPITAL | Age: 29
End: 2024-08-22
Attending: INTERNAL MEDICINE
Payer: COMMERCIAL

## 2024-08-22 DIAGNOSIS — R84.9 ABNORMAL RESPIRATORY LABORATORY RESULTS: ICD-10-CM

## 2024-08-22 LAB — C4 SERPL-MCNC: 24 MG/DL (ref 11–44)

## 2024-08-22 PROCEDURE — 86160 COMPLEMENT ANTIGEN: CPT | Mod: 59 | Performed by: INTERNAL MEDICINE

## 2024-08-22 PROCEDURE — 86160 COMPLEMENT ANTIGEN: CPT | Performed by: INTERNAL MEDICINE

## 2024-08-22 PROCEDURE — 86161 COMPLEMENT/FUNCTION ACTIVITY: CPT | Performed by: INTERNAL MEDICINE

## 2024-08-28 LAB — C1INH SERPL-MCNC: 23 MG/DL (ref 21–39)

## 2024-09-06 ENCOUNTER — TELEPHONE (OUTPATIENT)
Dept: INTERNAL MEDICINE | Facility: CLINIC | Age: 29
End: 2024-09-06
Payer: COMMERCIAL

## 2024-09-06 NOTE — TELEPHONE ENCOUNTER
----- Message from Juaquin Rodriguez sent at 9/6/2024  8:14 AM CDT -----  Contact: Pt. Portal  .Type:  Sooner Apoointment Request    Caller is requesting a sooner appointment.  Caller declined first available appointment listed below.  Caller will not accept being placed on the waitlist and is requesting a message be sent to doctor.  Name of Caller:pt  When is the first available appointment? Nov  Symptoms: Pain in shoulder/hand  Would the patient rather a call back or a response via [facility]?  Call back  Best Call Back Number: [phone]   Additional Information:

## 2024-09-09 ENCOUNTER — PATIENT MESSAGE (OUTPATIENT)
Dept: GASTROENTEROLOGY | Facility: CLINIC | Age: 29
End: 2024-09-09
Payer: COMMERCIAL

## 2024-09-09 ENCOUNTER — OFFICE VISIT (OUTPATIENT)
Dept: OBSTETRICS AND GYNECOLOGY | Facility: CLINIC | Age: 29
End: 2024-09-09
Attending: OBSTETRICS & GYNECOLOGY
Payer: COMMERCIAL

## 2024-09-09 VITALS
SYSTOLIC BLOOD PRESSURE: 102 MMHG | BODY MASS INDEX: 21.29 KG/M2 | WEIGHT: 120.13 LBS | DIASTOLIC BLOOD PRESSURE: 70 MMHG | HEIGHT: 63 IN

## 2024-09-09 DIAGNOSIS — R10.2 PELVIC PAIN IN FEMALE: ICD-10-CM

## 2024-09-09 DIAGNOSIS — R10.32 LEFT LOWER QUADRANT ABDOMINAL PAIN: ICD-10-CM

## 2024-09-09 DIAGNOSIS — M25.552 PAIN OF LEFT HIP: ICD-10-CM

## 2024-09-09 DIAGNOSIS — Z01.419 WELL WOMAN EXAM WITH ROUTINE GYNECOLOGICAL EXAM: Primary | ICD-10-CM

## 2024-09-09 DIAGNOSIS — N94.6 DYSMENORRHEA: ICD-10-CM

## 2024-09-09 LAB
C TRACH DNA SPEC QL NAA+PROBE: NOT DETECTED
N GONORRHOEA DNA SPEC QL NAA+PROBE: NOT DETECTED

## 2024-09-09 PROCEDURE — 99999 PR PBB SHADOW E&M-EST. PATIENT-LVL III: CPT | Mod: PBBFAC,,, | Performed by: OBSTETRICS & GYNECOLOGY

## 2024-09-09 PROCEDURE — 1159F MED LIST DOCD IN RCRD: CPT | Mod: CPTII,S$GLB,, | Performed by: OBSTETRICS & GYNECOLOGY

## 2024-09-09 PROCEDURE — 3008F BODY MASS INDEX DOCD: CPT | Mod: CPTII,S$GLB,, | Performed by: OBSTETRICS & GYNECOLOGY

## 2024-09-09 PROCEDURE — 3074F SYST BP LT 130 MM HG: CPT | Mod: CPTII,S$GLB,, | Performed by: OBSTETRICS & GYNECOLOGY

## 2024-09-09 PROCEDURE — 3044F HG A1C LEVEL LT 7.0%: CPT | Mod: CPTII,S$GLB,, | Performed by: OBSTETRICS & GYNECOLOGY

## 2024-09-09 PROCEDURE — 99395 PREV VISIT EST AGE 18-39: CPT | Mod: S$GLB,,, | Performed by: OBSTETRICS & GYNECOLOGY

## 2024-09-09 PROCEDURE — 87491 CHLMYD TRACH DNA AMP PROBE: CPT | Performed by: OBSTETRICS & GYNECOLOGY

## 2024-09-09 PROCEDURE — 87591 N.GONORRHOEAE DNA AMP PROB: CPT | Performed by: OBSTETRICS & GYNECOLOGY

## 2024-09-09 PROCEDURE — 3078F DIAST BP <80 MM HG: CPT | Mod: CPTII,S$GLB,, | Performed by: OBSTETRICS & GYNECOLOGY

## 2024-09-09 RX ORDER — DROSPIRENONE AND ETHINYL ESTRADIOL 0.02-3(28)
1 KIT ORAL DAILY
Qty: 90 TABLET | Refills: 4 | Status: SHIPPED | OUTPATIENT
Start: 2024-09-09 | End: 2025-09-09

## 2024-09-09 RX ORDER — TRAZODONE HYDROCHLORIDE 50 MG/1
25-100 TABLET ORAL NIGHTLY PRN
COMMUNITY
Start: 2024-09-03

## 2024-09-09 RX ORDER — HYDROCODONE BITARTRATE AND ACETAMINOPHEN 5; 325 MG/1; MG/1
1 TABLET ORAL EVERY 6 HOURS PRN
Qty: 10 TABLET | Refills: 0 | Status: SHIPPED | OUTPATIENT
Start: 2024-09-09

## 2024-09-09 RX ORDER — BUSPIRONE HYDROCHLORIDE 15 MG/1
7.5 TABLET ORAL 2 TIMES DAILY
COMMUNITY
Start: 2024-09-03

## 2024-09-09 NOTE — PROGRESS NOTES
"CC: Well woman exam    Devora Meeks is a 29 y.o. female  presents for well woman exam.  LMP: Patient's last menstrual period was 2024 (approximate)..    Continues to have intermittent pelvic pain. Some improvement with OCPs, periods of pain were 3 weeks of the month, now maybe two weeks of the month.  Describes as "zingers" stabbing, doubles over.  Cannot point to a cyclic nature.  Requiring hydrocodone 1x/week.  Will try taking continuous OCPs, if no improvement in a couple of cycles, will consider diagnostic scope.  Pap - neg.      Past Medical History:   Diagnosis Date    Abnormal Pap smear of cervix      Past Surgical History:   Procedure Laterality Date    CERVICAL BIOPSY  W/ LOOP ELECTRODE EXCISION N/A     one abnormal after, clear since that time    COLONOSCOPY N/A 2024    Procedure: COLONOSCOPY;  Surgeon: Crispin Ardon MD;  Location: [address]);  Service: Endoscopy;  Laterality: N/A;    COLPOSCOPY      ESOPHAGOGASTRODUODENOSCOPY N/A 2024    Procedure: EGD (ESOPHAGOGASTRODUODENOSCOPY);  Surgeon: Crispin Ardon MD;  Location: [facility] ([address] [place] [address]);  Service: Endoscopy;  Laterality: N/A;  24- case length adjusted - ERW     Social History     Socioeconomic History    Marital status: Single   Occupational History    Occupation:      Comment: [employer]   Tobacco Use    Smoking status: Never    Smokeless tobacco: Never    Tobacco comments:     Occasional social smoking rarely   Substance and Sexual Activity    Alcohol use: Yes     Comment: occasionally    Drug use: Never    Sexual activity: Yes     Partners: Male     Birth control/protection: Condom, OCP     Social Determinants of Health     Financial Resource Strain: Low Risk  (2024)    Overall Financial Resource Strain (CARDIA)     Difficulty of Paying Living Expenses: Not very hard   Food Insecurity: No Food Insecurity (2024)    Hunger Vital Sign     Worried About Running Out of " "Food in the Last Year: Never true     Ran Out of Food in the Last Year: Never true   Transportation Needs: No Transportation Needs (2024)    TRANSPORTATION NEEDS     Transportation : No   Physical Activity: Sufficiently Active (2024)    Exercise Vital Sign     Days of Exercise per Week: 4 days     Minutes of Exercise per Session: 40 min   Stress: Stress Concern Present (2024)    [employer] - Occupational Stress Questionnaire     Feeling of Stress : Rather much   Housing Stability: Low Risk  (2024)    Housing Stability Vital Sign     Unable to Pay for Housing in the Last Year: No     Homeless in the Last Year: No     Family History   Problem Relation Name Age of Onset    Melanoma Mother Danya     Basal cell carcinoma Mother Danya     Cancer Mother Danya     Hypertension Mother Danya     Miscarriages / Stillbirths Mother Danya     Skin cancer Brother  30    Heart attack Maternal Grandfather  60    Colon cancer Maternal Uncle  45    Arthritis Maternal Grandmother M     Heart disease Maternal Grandmother M      OB History          0    Para   0    Term   0       0    AB   0    Living   0         SAB   0    IAB   0    Ectopic   0    Multiple   0    Live Births   0                 /70   Ht 5' 3" (1.6 m)   Wt 54.5 kg (120 lb 1.6 oz)   LMP 2024 (Approximate)   BMI 21.27 kg/m²       ROS:  GENERAL: Denies weight gain or weight loss. Feeling well overall.   SKIN: Denies rash or lesions.   HEAD: Denies head injury or headache.   NODES: Denies enlarged lymph nodes.   CHEST: Denies chest pain or shortness of breath.   CARDIOVASCULAR: Denies palpitations or left sided chest pain.   ABDOMEN: No abdominal pain, constipation, diarrhea, nausea, vomiting or rectal bleeding.   URINARY: No frequency, dysuria, hematuria, or burning on urination.  REPRODUCTIVE: See HPI.   BREASTS: The patient performs breast self-examination and denies pain, lumps, or nipple " discharge.   HEMATOLOGIC: No easy bruisability or excessive bleeding.   MUSCULOSKELETAL: Denies joint pain or swelling.   NEUROLOGIC: Denies syncope or weakness.   PSYCHIATRIC: Denies depression, anxiety or mood swings.    PHYSICAL EXAM:  APPEARANCE: Well nourished, well developed, in no acute distress.  AFFECT: WNL, alert and oriented x 3  SKIN: No acne or hirsutism  NECK: Neck symmetric without masses or thyromegaly  NODES: No inguinal, cervical, axillary, or femoral lymph node enlargement  CHEST: Good respiratory effect  ABDOMEN: Soft.  Mildly TTP over LLQ, psoas?  No hepatosplenomegaly.  No hernias.  BREASTS: Symmetrical, no skin changes or visible lesions.  No palpable masses, nipple discharge bilaterally.  PELVIC: left ASIS, AIIS, pubic symph elevated.  Normal external genitalia without lesions.  Normal hair distribution.  Adequate perineal body, normal urethral meatus.  Vagina moist and well rugated without lesions or discharge.  Cervix pink, without lesions, discharge or tenderness.  No significant cystocele or rectocele.  Bimanual exam shows uterus to be normal size, regular, mobile and nontender.  Adnexa without masses or tenderness.    EXTREMITIES: No edema.    Well woman exam with routine gynecological exam  -     C. trachomatis/N. gonorrhoeae by [facility]; Vagina    Pelvic pain in female  -     Ambulatory referral/consult to Physical/Occupational Therapy; Future; Expected date: 09/16/2024    Pain of left hip  -     Ambulatory referral/consult to Sports Medicine; Future; Expected date: 09/16/2024            Patient was counseled today on A.C.S. Pap guidelines and recommendations for yearly pelvic exams, mammograms and monthly self breast exams; to see her PCP for other health maintenance.     No follow-ups on file.

## 2024-09-17 ENCOUNTER — HOSPITAL ENCOUNTER (OUTPATIENT)
Dept: RADIOLOGY | Facility: HOSPITAL | Age: 29
Discharge: HOME | End: 2024-09-17
Attending: INTERNAL MEDICINE
Payer: COMMERCIAL

## 2024-09-17 ENCOUNTER — OFFICE VISIT (OUTPATIENT)
Dept: INTERNAL MEDICINE | Facility: CLINIC | Age: 29
End: 2024-09-17
Payer: COMMERCIAL

## 2024-09-17 VITALS
WEIGHT: 125.25 LBS | OXYGEN SATURATION: 100 % | DIASTOLIC BLOOD PRESSURE: 70 MMHG | BODY MASS INDEX: 22.19 KG/M2 | HEART RATE: 66 BPM | SYSTOLIC BLOOD PRESSURE: 104 MMHG | HEIGHT: 63 IN

## 2024-09-17 DIAGNOSIS — M75.41 SHOULDER IMPINGEMENT SYNDROME, RIGHT: ICD-10-CM

## 2024-09-17 DIAGNOSIS — M25.511 CHRONIC RIGHT SHOULDER PAIN: ICD-10-CM

## 2024-09-17 DIAGNOSIS — G89.29 CHRONIC RIGHT SHOULDER PAIN: ICD-10-CM

## 2024-09-17 DIAGNOSIS — M25.511 CHRONIC RIGHT SHOULDER PAIN: Primary | ICD-10-CM

## 2024-09-17 DIAGNOSIS — G89.29 CHRONIC RIGHT SHOULDER PAIN: Primary | ICD-10-CM

## 2024-09-17 PROCEDURE — 3078F DIAST BP <80 MM HG: CPT | Mod: CPTII,S$GLB,, | Performed by: INTERNAL MEDICINE

## 2024-09-17 PROCEDURE — 73030 X-RAY EXAM OF SHOULDER: CPT | Mod: TC,RT

## 2024-09-17 PROCEDURE — 3074F SYST BP LT 130 MM HG: CPT | Mod: CPTII,S$GLB,, | Performed by: INTERNAL MEDICINE

## 2024-09-17 PROCEDURE — 3044F HG A1C LEVEL LT 7.0%: CPT | Mod: CPTII,S$GLB,, | Performed by: INTERNAL MEDICINE

## 2024-09-17 PROCEDURE — 1159F MED LIST DOCD IN RCRD: CPT | Mod: CPTII,S$GLB,, | Performed by: INTERNAL MEDICINE

## 2024-09-17 PROCEDURE — 99214 OFFICE O/P EST MOD 30 MIN: CPT | Mod: S$GLB,,, | Performed by: INTERNAL MEDICINE

## 2024-09-17 PROCEDURE — 3008F BODY MASS INDEX DOCD: CPT | Mod: CPTII,S$GLB,, | Performed by: INTERNAL MEDICINE

## 2024-09-17 PROCEDURE — 73030 X-RAY EXAM OF SHOULDER: CPT | Mod: 26,RT,, | Performed by: RADIOLOGY

## 2024-09-17 PROCEDURE — 1160F RVW MEDS BY RX/DR IN RCRD: CPT | Mod: CPTII,S$GLB,, | Performed by: INTERNAL MEDICINE

## 2024-09-17 PROCEDURE — 99999 PR PBB SHADOW E&M-EST. PATIENT-LVL IV: CPT | Mod: PBBFAC,,, | Performed by: INTERNAL MEDICINE

## 2024-09-17 NOTE — PROGRESS NOTES
"Subjective:       Patient ID: Devora Meeks is a 29 y.o. female.    Chief Complaint: Pain (Shoulder stiffness locked this morning/Having numbness in hand and having trouble gripping things /)    HPI  29 y.o. female here for follow up of    LLQ pelvic pain  Workup with gyn and GI.   Conitnuous OCP, considering diagnostic scope if fails to improve. She reports it is getting bete3r but not 100%.  EGD/colonoscopy - erythema of stomach, 2mm hyperplastic polyp, diverticulosis    She previously baked professionally including croissants by hand. If painting or writing too long she has trouble gripping next day. Today shoulder froze when she woke up. Hand does occasionally freeze such that she can't  but never shoulder like this. No prior dislocation but often has to "click" back in to place. Fell about 5 years ago. No prior therapy or imaging.   About 2 years of shoulder pain.   Even longer with intermittent hand pain - initially just achy but now wit decreased strength.   Right handed.  Review of Systems   Constitutional:  Negative for fever.   Respiratory:  Negative for shortness of breath.    Cardiovascular:  Negative for chest pain.   Musculoskeletal: Negative.    Skin: Negative.        Objective:   /70 (BP Location: Left arm, Patient Position: Sitting, BP Method: Medium (Manual))   Pulse 66   Ht 5' 3" (1.6 m)   Wt 56.8 kg (125 lb 3.5 oz)   LMP 08/09/2024 (Approximate)   SpO2 100%   BMI 22.18 kg/m²      Physical Exam  Constitutional:       General: She is not in acute distress.     Appearance: She is well-developed. She is not diaphoretic.   HENT:      Head: Normocephalic and atraumatic.   Cardiovascular:      Rate and Rhythm: Normal rate and regular rhythm.   Pulmonary:      Effort: Pulmonary effort is normal. No respiratory distress.      Breath sounds: No wheezing or rales.   Skin:     General: Skin is warm and dry.   Neurological:      Mental Status: She is alert and oriented to person, " place, and time.   Psychiatric:         Behavior: Behavior normal.         Assessment:       1. Chronic right shoulder pain    2. Shoulder impingement syndrome, right        Plan:       Chronic right shoulder pain   - suspect impingement  -     X-ray Shoulder 2 or More Views Right; Future; Expected date: 09/17/2024  -     Ambulatory referral/consult to Physical/Occupational Therapy; Future; Expected date: 09/24/2024  -     Ambulatory referral to Orthopedics; Future; Expected date: 09/24/2024            You are up to date for your primary preventive health care, and there are no reminders at this time.

## 2024-09-18 ENCOUNTER — OFFICE VISIT (OUTPATIENT)
Dept: ORTHOPEDICS | Facility: CLINIC | Age: 29
End: 2024-09-18
Payer: COMMERCIAL

## 2024-09-18 VITALS — BODY MASS INDEX: 22.27 KG/M2 | HEIGHT: 63 IN | WEIGHT: 125.69 LBS

## 2024-09-18 DIAGNOSIS — G89.29 CHRONIC RIGHT SHOULDER PAIN: ICD-10-CM

## 2024-09-18 DIAGNOSIS — S14.3XXA BRACHIAL PLEXUS INJURY, LEFT, INITIAL ENCOUNTER: Primary | ICD-10-CM

## 2024-09-18 DIAGNOSIS — M25.511 CHRONIC RIGHT SHOULDER PAIN: ICD-10-CM

## 2024-09-18 DIAGNOSIS — M75.41 SHOULDER IMPINGEMENT SYNDROME, RIGHT: ICD-10-CM

## 2024-09-18 PROCEDURE — 1160F RVW MEDS BY RX/DR IN RCRD: CPT | Mod: CPTII,S$GLB,, | Performed by: PHYSICIAN ASSISTANT

## 2024-09-18 PROCEDURE — 99999 PR PBB SHADOW E&M-EST. PATIENT-LVL III: CPT | Mod: PBBFAC,,, | Performed by: PHYSICIAN ASSISTANT

## 2024-09-18 PROCEDURE — 3044F HG A1C LEVEL LT 7.0%: CPT | Mod: CPTII,S$GLB,, | Performed by: PHYSICIAN ASSISTANT

## 2024-09-18 PROCEDURE — 99203 OFFICE O/P NEW LOW 30 MIN: CPT | Mod: S$GLB,,, | Performed by: PHYSICIAN ASSISTANT

## 2024-09-18 PROCEDURE — 1159F MED LIST DOCD IN RCRD: CPT | Mod: CPTII,S$GLB,, | Performed by: PHYSICIAN ASSISTANT

## 2024-09-18 PROCEDURE — 3008F BODY MASS INDEX DOCD: CPT | Mod: CPTII,S$GLB,, | Performed by: PHYSICIAN ASSISTANT

## 2024-09-18 NOTE — PROGRESS NOTES
SUBJECTIVE:     Chief Complaint & History of Present Illness:  Devora Meeks is a  New  patient 29 y.o. female who is seen here today with a complaint of  right shoulder pain with radiation down the arm to the hand .  Patient is here today for evaluation treatment progressively worsening soreness and pain in the right shoulder with radiation down the arm and episodes of weakness and paresthesias in the 4th and 5th digits as well as decreased  strength.  States that she has had slowly progressing symptoms over the past few months and 1 episode of locking in the shoulder when she rolls in the morning.  She has not had significant swelling and overall maintains good range of motion.  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  Which seems to exacerbate her condition.  Was seen by her primary care few days ago and is scheduled to begin physical therapy this week  On a scale of 1-10, with 10 being worst pain imaginable, he rates this pain as 3 on good days and 6 on bad days.  she describes the pain as sore.    Review of patient's allergies indicates:  No Known Allergies      Current Outpatient Medications   Medication Sig Dispense Refill    busPIRone (BUSPAR) 15 MG tablet Take 7.5 mg by mouth 2 (two) times daily.      drospirenone-ethinyl estradioL (NBA) 3-0.02 mg per tablet Take 1 tablet by mouth once daily. Patient to skip placebo and take continuously 90 tablet 4    HYDROcodone-acetaminophen (NORCO) 5-325 mg per tablet Take 1 tablet by mouth every 6 (six) hours as needed for Pain. 10 tablet 0    methylphenidate HCl (RITALIN) 5 MG tablet Take 5 mg by mouth 2 (two) times daily.      traZODone (DESYREL) 50 MG tablet Take  mg by mouth nightly as needed.       No current facility-administered medications for this visit.       Past Medical History:   Diagnosis Date    Abnormal Pap smear of  "cervix        Past Surgical History:   Procedure Laterality Date    CERVICAL BIOPSY  W/ LOOP ELECTRODE EXCISION N/A 2016    one abnormal after, clear since that time    COLONOSCOPY N/A 8/6/2024    Procedure: COLONOSCOPY;  Surgeon: Crispin Ardon MD;  Location: [facility] ([address] [place] [address]);  Service: Endoscopy;  Laterality: N/A;    COLPOSCOPY      ESOPHAGOGASTRODUODENOSCOPY N/A 8/6/2024    Procedure: EGD (ESOPHAGOGASTRODUODENOSCOPY);  Surgeon: Crispin Ardon MD;  Location: [address]);  Service: Endoscopy;  Laterality: N/A;  6/14/24- case length adjusted - ERW       Vital Signs (Most Recent)  There were no vitals filed for this visit.    Review of Systems:  ROS:  Constitutional: no fever or chills  Eyes: no visual changes  ENT: no nasal congestion or sore throat  Respiratory: no cough or shortness of breath  Cardiovascular: no chest pain or palpitations  Gastrointestinal: no nausea or vomiting, tolerating diet  Genitourinary: no hematuria or dysuria  Integument/Breast: no rash or pruritis  Hematologic/Lymphatic: no easy bruising or lymphadenopathy  Musculoskeletal: no arthralgias or myalgias  Neurological: no seizures or tremors  Behavioral/Psych: no auditory or visual hallucinations  Endocrine: no heat or cold intolerance      OBJECTIVE:     PHYSICAL EXAM:  Height: 5' 3" (160 cm) Weight: 57 kg (125 lb 10.6 oz), General Appearance: Well nourished, well developed, in no acute distress.  Neurological: Mood & affect are normal.  Shoulder exam:  right  Tenderness: AC joint, anterior glenohumeral  ROM: forward flexion 180/180, extension 45/45, full abduction 180/180, abduction-glenohumeral 90/90, external rotation 50/50  Shoulder Strength: biceps 5/5, triceps 5/5, abduction 5/5, adduction 5/5, external rotation 5/5 with shoulder at side, flexion 5/5, and extension 5/5  positive for tenderness about the glenohumeral joint, positive for tenderness over the acromioclavicular joint, and negative for impingement " sign  Stability tests: anterior apprehension test positive for pain only and posterior apprehension test negative  Special Tests:Cross-chest abduction: negative and Dallas's test: negative       Shoulder exam:  left  Tenderness: none  ROM: forward flexion 180/180, extension 45/45, full abduction 180/180, abduction-glenohumeral 90/90, external rotation 50/50  Shoulder Strength: biceps 5/5, triceps 5/5, abduction 5/5, adduction 5/5, external rotation 5/5 with shoulder at side, flexion 5/5, and extension 5/5  negative for tenderness about the glenohumeral joint, negative for tenderness over the acromioclavicular joint, and negative for impingement sign  Stability tests: anterior apprehension test negative and posterior apprehension test negative  Special Tests:Cross-chest abduction: negative and Dallas's test: negative                RADIOGRAPHS:  X-rays of the shoulder from previous visit reviewed by me today demonstrate no evidence of fracture dislocation joint spaces well maintained no advanced degenerative joint disease    ASSESSMENT/PLAN:       ICD-10-CM ICD-9-CM   1. Brachial plexus injury, left, initial encounter  S14.3XXA 953.4   2. Chronic right shoulder pain  M25.511 719.41    G89.29 338.29   3. Shoulder impingement syndrome, right  M75.41 726.2       Plan: We discussed with the patient at length all the different treatment options available for her right shoulder including anti-inflammatories, acetaminophen, rest, ice, Physical therapy to include strengthening exercise, occasional cortisone injections for temporary relief, arthroscopic surgical repair, and finally shoulder arthroplasty.   Patient is scheduled to begin physical therapy this week  Meloxicam 15 mg q.d. with food times 10 days followed by p.r.n.  Follow-up in 2 weeks if symptoms not significantly improved consider injection therapy or MRI depending upon symptoms and response

## 2024-09-20 DIAGNOSIS — M25.552 LEFT HIP PAIN: Primary | ICD-10-CM

## 2024-09-23 ENCOUNTER — PATIENT MESSAGE (OUTPATIENT)
Dept: ORTHOPEDICS | Facility: CLINIC | Age: 29
End: 2024-09-23
Payer: COMMERCIAL

## 2024-09-23 RX ORDER — MELOXICAM 15 MG/1
15 TABLET ORAL DAILY
Qty: 30 TABLET | Refills: 1 | Status: SHIPPED | OUTPATIENT
Start: 2024-09-23

## 2024-09-25 DIAGNOSIS — S14.3XXA BRACHIAL PLEXUS INJURY, LEFT, INITIAL ENCOUNTER: Primary | ICD-10-CM

## 2024-09-25 DIAGNOSIS — M75.41 SHOULDER IMPINGEMENT SYNDROME, RIGHT: ICD-10-CM

## 2024-09-25 DIAGNOSIS — M25.511 ACUTE PAIN OF RIGHT SHOULDER: ICD-10-CM

## 2024-09-25 DIAGNOSIS — G89.29 CHRONIC RIGHT SHOULDER PAIN: ICD-10-CM

## 2024-09-25 DIAGNOSIS — M25.511 CHRONIC RIGHT SHOULDER PAIN: ICD-10-CM

## 2024-09-26 ENCOUNTER — TELEPHONE (OUTPATIENT)
Dept: ORTHOPEDICS | Facility: CLINIC | Age: 29
End: 2024-09-26
Payer: COMMERCIAL

## 2024-09-26 NOTE — TELEPHONE ENCOUNTER
Tried contacting patient regarding scheduling a mri appointment , no answer left message on voice mail

## 2024-09-30 ENCOUNTER — CLINICAL SUPPORT (OUTPATIENT)
Dept: REHABILITATION | Facility: HOSPITAL | Age: 29
End: 2024-09-30
Attending: INTERNAL MEDICINE
Payer: COMMERCIAL

## 2024-09-30 DIAGNOSIS — G89.29 CHRONIC RIGHT SHOULDER PAIN: ICD-10-CM

## 2024-09-30 DIAGNOSIS — M75.41 SHOULDER IMPINGEMENT SYNDROME, RIGHT: ICD-10-CM

## 2024-09-30 DIAGNOSIS — M25.611 DECREASED RANGE OF MOTION OF RIGHT SHOULDER: Primary | ICD-10-CM

## 2024-09-30 DIAGNOSIS — R29.898 DECREASED GRIP STRENGTH OF RIGHT HAND: ICD-10-CM

## 2024-09-30 DIAGNOSIS — M25.511 CHRONIC RIGHT SHOULDER PAIN: ICD-10-CM

## 2024-09-30 PROCEDURE — 97112 NEUROMUSCULAR REEDUCATION: CPT

## 2024-09-30 PROCEDURE — 97163 PT EVAL HIGH COMPLEX 45 MIN: CPT

## 2024-09-30 NOTE — PROGRESS NOTES
"[facility]   Physical Therapy Initial Evaluation      Name: Devora Meeks  Clinic Number: [id number]    Therapy Diagnosis: No diagnosis found.     Physician: Anastasia Sung MD    Physician Orders: PT Eval and Treat   Medical Diagnosis from Referral:   M25.511,G89.29 (ICD-10-CM) - Chronic right shoulder pain   M75.41 (ICD-10-CM) - Shoulder impingement syndrome, right   Evaluation Date: 9/30/2024  Authorization Period Expiration: 12/31/2024  Plan of Care Expiration: 11/30/2024  Progress Note Due: 10/29/2024  Visit # / Visits authorized: 1/ 1   FOTO: 1/3    Precautions: Standard,     Time In: 9:03 am  Time Out: 9:58 am   Total Appointment Time (timed & untimed codes): 55 minutes    Subjective     Date of onset:     History of current condition - [facility] reports: ***    Falls: No    Imaging: X-Ray: 9/17/2024  EXAMINATION:  XR SHOULDER COMPLETE 2 OR MORE VIEWS RIGHT     CLINICAL HISTORY:  Pain in right shoulder     TECHNIQUE:  Two or three views of the right shoulder were performed.     COMPARISON:  None     FINDINGS:  No fracture or dislocation.  No bone destruction identified.  No soft tissue calcifications.     Impression:     See above    Prior Therapy: ***  Social History: *** {LIVES WITH:58974}  Occupation: ***  Prior Level of Function: ***  Current Level of Function: ***    Pain:  Current {0-10:21056::"0"}/10, worst {0-10:20507::"0"}/10, best {0-10:20507::"0"}/10   Location: {RIGHT LEFT BILATERAL:20988} {LOCATION ON BODY:92903}   Description: {Pain Description:37697}  Aggravating Factors: {Causes; Pain:09761}  Easing Factors: {Pain (activities that relieve):55162}    Patients goals: ***     Medical History:   Past Medical History:   Diagnosis Date    Abnormal Pap smear of cervix        Surgical History:   Devora Meeks  has a past surgical history that includes Cervical biopsy w/ loop electrode excision (N/A, 2016); Colposcopy; Colonoscopy (N/A, 8/6/2024); and " Esophagogastroduodenoscopy (N/A, 8/6/2024).    Medications:   Devora has a current medication list which includes the following prescription(s): buspirone, drospirenone-ethinyl estradiol, hydrocodone-acetaminophen, meloxicam, methylphenidate hcl, and trazodone.    Allergies:   Review of patient's allergies indicates:  No Known Allergies     Objective      Observation: ***    Posture: ***    Passive Range of Motion:   Shoulder Right Left   Flexion *** ***   Abduction *** ***   ER at 0 *** ***   ER at 90 *** ***   IR *** ***      Active Range of Motion:   Shoulder Right Left   Flexion *** ***   Abduction *** ***   ER at 0 *** ***   ER at 90 *** ***   IR *** ***   Reach behind head *** ***   Reach behind back  *** ***     Strength:  Shoulder Right Left   Flexion *** ***   Abduction *** ***   ER *** ***   IR *** ***   Serratus Anterior *** ***   Middle Trap *** ***   Low Trap *** ***       Special Tests:  Impingement Cluster:   Right Left   Hawkin's [place]ndy *** ***   Painful Arc *** ***   Resisted ER *** ***     Rotator Cuff Tear   Right Left   Painful Arc *** ***   Drop Arm test *** ***   Resisted ER *** ***     Instability:   Right Left   Anterior Apprehension Test *** ***   Relocation test *** ***   Posterior Apprehension Test *** ***   Sulcus Sign *** ***     SLAP:   Right Left   Biceps Load II *** ***   O'Michael's Test *** ***     Other:   Right Left   Subscapularis Lift Off Test *** ***   Empty Can *** ***     Joint Mobility: ***    Palpation: ***    Sensation: ***    Flexibility: ***  Lat: R *** ; L ***   Pec Minor: R *** ; L ***     Intake Outcome Measure for FOTO *** Survey    Therapist reviewed FOTO scores for Devora Meeks on 9/30/2024.   FOTO documents entered into [employer] - see Media section.    Intake Score: ***%         Treatment     Total Treatment time (time-based codes) separate from Evaluation: *** minutes     Devora received the treatments listed below:      therapeutic exercises to develop  "ROM, strength, flexibility, endurance for *** minutes including:  ***    manual therapy techniques: joint mobilizations and/or soft tissue mobilizations were applied to the: *** for *** minutes, including:  ***    neuromuscular re-education activities to improve: motor control, balance, muscle activation, proprioception, posture for *** minutes. The following activities were included:  ***    therapeutic activities to improve functional performance for ***  minutes, including:  ***    gait training to improve functional mobility and safety for ***  minutes, including:  ***      Patient Education and Home Exercises     Education provided:   - Diagnosis and prognosis    Written Home Exercises Provided: {Blank single:22622::"yes","Patient instructed to cont prior HEP"}. Exercises were reviewed and Devora was able to demonstrate them prior to the end of the session.  Devora demonstrated {Desc; good/fair/poor:07933} understanding of the education provided. See EMR under Patient Instructions for exercises provided during therapy sessions.    Assessment     Devora is a 29 y.o. female referred to outpatient Physical Therapy with a medical diagnosis of ***. Patient presents with ***    Patient prognosis is {REHAB PROGNOSIS OHS:86776}.   Patient will benefit from skilled outpatient Physical Therapy to address the deficits stated above and in the chart below, provide patient /family education, and to maximize patientt's level of independence.     Plan of care discussed with patient: Yes  Patient's spiritual, cultural and educational needs considered and patient is agreeable to the plan of care and goals as stated below:     Anticipated Barriers for therapy: ***    Medical Necessity is demonstrated by the following  History  Co-morbidities and personal factors that may impact the plan of care [x] LOW: no personal factors / co-morbidities  [] MODERATE: 1-2 personal factors / co-morbidities  [] HIGH: 3+ personal factors / " "co-morbidities    Moderate / High Support Documentation:   Co-morbidities affecting plan of care: ***    Personal Factors:   {Personal Factors:16511}     Examination  Body Structures and Functions, activity limitations and participation restrictions that may impact the plan of care [x] LOW: addressing 1-2 elements  [] MODERATE: 3+ elements  [] HIGH: 4+ elements (please support below)    Moderate / High Support Documentation: ***     Clinical Presentation [x] LOW: stable  [] MODERATE: Evolving  [] HIGH: Unstable     Decision Making/ Complexity Score: {Desc; low/moderate/high:981606}       GOALS:   Short Term Goals:  *** weeks  1.Report decreased *** pain < / =  ***/10  to increase tolerance for ***  2. Increase PROM ***   3. Increased strength by 1/3 MMT grade in *** to increase tolerance for ADL and work activities.  4. Pt to tolerate HEP to improve ROM and independence with ADL's    Long Term Goals: *** weeks  1.Report decreased *** pain  < / =  *** /10  to increase tolerance for ***  2.Increase AROM to ***  3.Increase strength to >/= 4/5 in *** to increase tolerance for ADL and work activities.  4. Pt goal: ***  5. Pt will have improved gcode of CJ (20-40% limited) on FOTO shoulder in order to demonstrate true functional improvement.     Plan     Plan of care Certification: 9/30/2024 to ***.    Outpatient Physical Therapy {NUMBERS 1-5:29206} times weekly for {0-10:88786::"0"} weeks to include the following interventions: manual therapy, therapeutic exercise, therapeutic activities, and neuromuscular re-education.    Robert Mayhorp, PT, DPT    "

## 2024-10-01 NOTE — PLAN OF CARE
[facility]   Physical Therapy Initial Evaluation      Name: Devora Meeks  Clinic Number: [id number]    Therapy Diagnosis:   Encounter Diagnoses   Name Primary?    Chronic right shoulder pain     Shoulder impingement syndrome, right     Decreased range of motion of right shoulder Yes    Decreased  strength of right hand         Physician: Anastasia Sung MD    Physician Orders: PT Eval and Treat   Medical Diagnosis from Referral:   M25.511,G89.29 (ICD-10-CM) - Chronic right shoulder pain   M75.41 (ICD-10-CM) - Shoulder impingement syndrome, right   Evaluation Date: 9/30/2024  Authorization Period Expiration: 12/31/2024  Plan of Care Expiration: 11/30/2024  Progress Note Due: 10/29/2024  Visit # / Visits authorized: 1/1   FOTO: 1/3    Precautions: Standard,     Time In: 9:03 am  Time Out: 9:58 am   Total Appointment Time (timed & untimed codes): 55 minutes    Subjective     Date of onset: years ago     History of current condition - Devora reports: she has had R shoulder pain going on for years now, but it has become much worse and she can no longer take it. Pt has shocking pains and loses her . Pt has numbness and tingling down her arm into her R pinky and ring fingers. Pt reports she has no history of neck pain and neck movement does not increase symptoms. Pt reports every time she moves her shoulder it feels like it is stuck in mud and some days her shoulder feels stuck and she cannot move it. Pt is R hand dominant and is really affected in her job, ADLs, and recreational activities by this pain. Pt does report falling on this arm about six yeas ago, but did not start having symptoms until years later.      Falls: No    Imaging: X-Ray: 9/17/2024  EXAMINATION:  XR SHOULDER COMPLETE 2 OR MORE VIEWS RIGHT     CLINICAL HISTORY:  Pain in right shoulder     TECHNIQUE:  Two or three views of the right shoulder were performed.     COMPARISON:  None     FINDINGS:  No  fracture or dislocation.  No bone destruction identified.  No soft tissue calcifications.     Impression:     See above    Prior Therapy: none  Occupation: gm of restaurant (lots of lifting)  Prior Level of Function: able to carry out ADLs and job with no pain   Current Level of Function: cannot lift items, unable to complete ADLs with R hand, and unable to participate in hobbies     Pain:  Current 6/10, worst 10/10, best 1/10   Location: right shoulder    Description: Numb, Sharp, and Shooting  Aggravating Factors: Lifting, Driving, Pulling up pants, Putting on bra   Easing Factors: rest and using L arm to hold the R    Patients goals: to return to ADLs, working, and painting without pain      Medical History:   Past Medical History:   Diagnosis Date    Abnormal Pap smear of cervix        Surgical History:   Devora Meeks  has a past surgical history that includes Cervical biopsy w/ loop electrode excision (N/A, 2016); Colposcopy; Colonoscopy (N/A, 8/6/2024); and Esophagogastroduodenoscopy (N/A, 8/6/2024).    Medications:   Devora has a current medication list which includes the following prescription(s): buspirone, drospirenone-ethinyl estradiol, hydrocodone-acetaminophen, meloxicam, methylphenidate hcl, and trazodone.    Allergies:   Review of patient's allergies indicates:  No Known Allergies     Objective      Observation:     Posture: Pt sits with rounded shoulders and forward head posture; anteriorly tipped scapulas, bilaterally depressed shoulders. R shoulder sits more anterior than her L shoulder.    Passive Range of Motion:*denotes pain   Shoulder Right Left   Flexion 140* WNL   Abduction 150* WNL   ER at 0 WNL WNL   ER at 90 WNL WNL   IR WNL WNL      Active Range of Motion:*denotes pain   Shoulder Right Left   Flexion 140* WNL   Abduction 150* WNL   ER at 0 WNL WNL   ER at 90 WNL WNL   IR WNL WNL   Reach behind head T12* T12   Reach behind back  T1* T1     Strength:  Shoulder Right Left  "  Flexion 3-/5 4/5   Abduction 3/5 4/5   ER 3/5 4/5   IR 4/5 4+/5   Serratus Anterior 3-/5 4/5   Middle Trap 3/5 4/5   Low Trap 3-/5 3/5       Special Tests:  Impingement Cluster:   Right Left   Elana Lee NT due to irritability NT due to irritability    Painful Arc + -   Resisted ER + 0     Rotator Cuff Tear   Right Left   Painful Arc + -   Drop Arm test - -   Resisted ER + -     Joint Mobility: decreased inferior and posterior glides, normal cervical motion     Palpation: TTP to anterior shoulder joint and biceps tendon    Sensation: intact    Cervical tests:   Spurlings: -   Distraction: -   ULTT: unable to perform due to irritability with shoulder depression    :  L: 22 lbs  R: 12 lbs    Intake Outcome Measure for FOTO Shoulder Survey    Therapist reviewed FOTO scores for Devora Meeks on 9/30/2024.   FOTO documents entered into [employer] - see Media section.    Intake Score: 44%         Treatment     Total Treatment time (time-based codes) separate from Evaluation: 11 minutes     Devora received the treatments listed below:      neuromuscular re-education activities to improve: motor control, balance, muscle activation, proprioception, posture for 11 minutes. The following activities were included:  Chin tucks 10x   Ulnar nerve glides 10x   Shoulder isometrics flexion, IR/ER 10x 10" holds each     Patient Education and Home Exercises     Education provided:   - Diagnosis and prognosis    Written Home Exercises Provided: yes. Exercises were reviewed and Devora was able to demonstrate them prior to the end of the session.  Devora demonstrated good  understanding of the education provided. See EMR under Patient Instructions for exercises provided during therapy sessions.    Assessment     Devora is a 29 y.o. female referred to outpatient Physical Therapy with a medical diagnosis of M25.511,G89.29 (ICD-10-CM) - Chronic right shoulder pain and M75.41 (ICD-10-CM) - Shoulder impingement syndrome, " right. Patient presents with decreased R shoulder joint mobility, decreased R shoulder ROM, increased R shoulder pain, decreased R shoulder strength, and high levels of irritability and central sensitivity in the shoulder. Pt is severely limited with ADLs, work, and recreational activities due to R shoulder. Pt signs and symptoms are consistent with ulnar nerve compression and shoulder impingement syndrome.     Patient prognosis is Fair.   Patient will benefit from skilled outpatient Physical Therapy to address the deficits stated above and in the chart below, provide patient /family education, and to maximize patientt's level of independence.     Plan of care discussed with patient: Yes  Patient's spiritual, cultural and educational needs considered and patient is agreeable to the plan of care and goals as stated below:     Anticipated Barriers for therapy: work    Medical Necessity is demonstrated by the following  History  Co-morbidities and personal factors that may impact the plan of care [] LOW: no personal factors / co-morbidities  [] MODERATE: 1-2 personal factors / co-morbidities  [x] HIGH: 3+ personal factors / co-morbidities    Moderate / High Support Documentation:   Co-morbidities affecting plan of care: chronic pain, high irritability levels, centralized pain    Personal Factors:   coping style     Examination  Body Structures and Functions, activity limitations and participation restrictions that may impact the plan of care [] LOW: addressing 1-2 elements  [] MODERATE: 3+ elements  [x] HIGH: 4+ elements (please support below)    Moderate / High Support Documentation: decreased participation in hobbies, cannot fully participate in work, decreased  strength making it hard to complete fine motor skills, difficulty completing ADLs w/ right , radiating pain      Clinical Presentation [] LOW: stable  [] MODERATE: Evolving  [x] HIGH: Unstable     Decision Making/ Complexity Score: high       GOALS:    Short Term Goals:  4 weeks  1.Report decreased R shoulder and arm pain < / =  5/10  to increase tolerance for ADLs  2. Increase PROM by 10 degrees   3. Increased strength by 1/3 MMT grade in R shoulder and periscapular area to increase tolerance for ADL and work activities.  4. Pt to tolerate HEP to improve ROM and independence with ADL's    Long Term Goals: 8 weeks  1.Report decreased R shoulder and arm pain  < / =  2 /10  to increase tolerance for painting and work  2.Increase AROM to WNL  3.Increase strength to >/= 4/5 in R shoulder and periscapular area to increase tolerance for ADL and work activities.  4. 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 Pt will have improved gcode of CJ (20-40% limited) on FOTO shoulder in order to demonstrate true functional improvement.     Plan     Plan of care Certification: 9/30/2024 to 11/30/24.    Outpatient Physical Therapy 2 times weekly for 8 weeks to include the following interventions: manual therapy, therapeutic exercise, therapeutic activities, and neuromuscular re-education.    Robert Arrington, PT, DPT

## 2024-10-03 ENCOUNTER — CLINICAL SUPPORT (OUTPATIENT)
Dept: REHABILITATION | Facility: HOSPITAL | Age: 29
End: 2024-10-03
Payer: COMMERCIAL

## 2024-10-03 DIAGNOSIS — M25.611 DECREASED RANGE OF MOTION OF RIGHT SHOULDER: Primary | ICD-10-CM

## 2024-10-03 DIAGNOSIS — R29.898 DECREASED GRIP STRENGTH OF RIGHT HAND: ICD-10-CM

## 2024-10-03 PROCEDURE — 97112 NEUROMUSCULAR REEDUCATION: CPT

## 2024-10-03 PROCEDURE — 97140 MANUAL THERAPY 1/> REGIONS: CPT

## 2024-10-03 NOTE — PROGRESS NOTES
[facility]   Physical Therapy Treatment Note      Name: Devora Isaacs  Clinic Number: [id number]    Therapy Diagnosis:   Encounter Diagnoses   Name Primary?    Decreased range of motion of right shoulder Yes    Decreased  strength of right hand      Physician: Anastasia Sung MD    Visit Date: 10/3/2024    Physician Orders: PT Eval and Treat   Medical Diagnosis from Referral:   M25.511,G89.29 (ICD-10-CM) - Chronic right shoulder pain   M75.41 (ICD-10-CM) - Shoulder impingement syndrome, right   Evaluation Date: 9/30/2024  Authorization Period Expiration: 12/31/2024  Plan of Care Expiration: 11/30/2024  Progress Note Due: 10/29/2024  Visit # / Visits authorized: 1/1   FOTO: 1/3     Precautions: Standard,     PTA Visit #: 0/5     Time In: 6:00 pm   Time Out: 6:43 pm  Total Billable Time: 43 minutes    Subjective     Pt reports: She is feeling much better today since she has not had towork as much since Monday.  She was compliant with home exercise program.  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     Treatment     Devora received the treatments listed below:      therapeutic exercises to develop strength, endurance, ROM, flexibility, posture, and core stabilization for 1 minutes including:  AAROM supine shoulder flexion with dowel 20x       manual therapy techniques: Joint mobilizations, Manual traction, Myofacial release, Manual Lymphatic Drainage, Soft tissue Mobilization, and Friction Massage were applied to the: R shoulder for 18 minutes, including:  PROM  Shoulder joint assessment   Neck assessment   First rib mobilization grades III-IV  Shoulder oscillations     neuromuscular re-education activities to improve: Balance, Coordination, Kinesthetic, Sense, Proprioception, and Posture for 24  "minutes. The following activities were included:  Walk outs 2x10   Chin tucks 2x10   Shoulder isometrics 4 ways 10x 10" holds   Scap squeezes 10x   No moneys RTB 2x10     therapeutic activities to improve functional performance for 0  minutes, including:        Patient Education and Home Exercises       Education provided:   - continuing HEP    Written Home Exercises Provided: YES. Exercises were reviewed and eDvora was able to demonstrate them prior to the end of the session.  Devora demonstrated good understanding of the education provided. See EMR under Patient Instructions for exercises provided during therapy sessions    Assessment     Pt did not tolerate exercises well and exercises caused an increase in shoulder pain and pain down her arm. Pt's arm became more sensitive to touch and hurt more after isometric exercises. Pt will be regressed to tolerate exercises better and dry needling will be attempted next session.     Devora Is progressing well towards her goals.   Pt prognosis is Good.     Pt will continue to benefit from skilled outpatient physical therapy to address the deficits listed in the problem list box on initial evaluation, provide pt/family education and to maximize pt's level of independence in the home and community environment.     Pt's spiritual, cultural and educational needs considered and pt agreeable to plan of care and goals.     Anticipated barriers to physical therapy: work    GOALS:   Short Term Goals:  4 weeks  1.Report decreased R shoulder and arm pain < / =  5/10  to increase tolerance for ADLs  2. Increase PROM by 10 degrees   3. Increased strength by 1/3 MMT grade in R shoulder and periscapular area to increase tolerance for ADL and work activities.  4. Pt to tolerate HEP to improve ROM and independence with ADL's     Long Term Goals: 8 weeks  1.Report decreased R shoulder and arm pain  < / =  2 /10  to increase tolerance for painting and work  2.Increase AROM to " WNL  3.Increase strength to >/= 4/5 in R shoulder and periscapular area to increase tolerance for ADL and work activities.  4. 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 Pt will have improved gcode of CJ (20-40% limited) on FOTO shoulder in order to demonstrate true functional improvement.     Plan     Plan of care Certification: 9/30/2024 to 11/30/24.     Continue POC.     Robert Sculthorp PT, DPT

## 2024-10-05 ENCOUNTER — HOSPITAL ENCOUNTER (OUTPATIENT)
Dept: RADIOLOGY | Facility: OTHER | Age: 29
Discharge: HOME | End: 2024-10-05
Attending: PHYSICIAN ASSISTANT
Payer: COMMERCIAL

## 2024-10-05 DIAGNOSIS — G89.29 CHRONIC RIGHT SHOULDER PAIN: ICD-10-CM

## 2024-10-05 DIAGNOSIS — M25.511 ACUTE PAIN OF RIGHT SHOULDER: ICD-10-CM

## 2024-10-05 DIAGNOSIS — M25.511 CHRONIC RIGHT SHOULDER PAIN: ICD-10-CM

## 2024-10-05 PROCEDURE — 73221 MRI JOINT UPR EXTREM W/O DYE: CPT | Mod: TC,RT

## 2024-10-05 PROCEDURE — 73221 MRI JOINT UPR EXTREM W/O DYE: CPT | Mod: 26,RT,, | Performed by: RADIOLOGY

## 2024-10-07 ENCOUNTER — PATIENT MESSAGE (OUTPATIENT)
Dept: ORTHOPEDICS | Facility: CLINIC | Age: 29
End: 2024-10-07
Payer: COMMERCIAL

## 2024-10-07 ENCOUNTER — PATIENT MESSAGE (OUTPATIENT)
Dept: REHABILITATION | Facility: HOSPITAL | Age: 29
End: 2024-10-07

## 2024-10-07 ENCOUNTER — CLINICAL SUPPORT (OUTPATIENT)
Dept: REHABILITATION | Facility: HOSPITAL | Age: 29
End: 2024-10-07
Payer: COMMERCIAL

## 2024-10-07 DIAGNOSIS — M25.611 DECREASED RANGE OF MOTION OF RIGHT SHOULDER: Primary | ICD-10-CM

## 2024-10-07 DIAGNOSIS — R29.898 DECREASED GRIP STRENGTH OF RIGHT HAND: ICD-10-CM

## 2024-10-07 PROCEDURE — 97140 MANUAL THERAPY 1/> REGIONS: CPT

## 2024-10-07 PROCEDURE — 97112 NEUROMUSCULAR REEDUCATION: CPT

## 2024-10-07 NOTE — PROGRESS NOTES
"[facility]   Physical Therapy Treatment Note      Name: Devora Meeks  Clinic Number: [id number]    Therapy Diagnosis:   Encounter Diagnoses   Name Primary?    Decreased range of motion of right shoulder Yes    Decreased  strength of right hand        Physician: Anastasia Sung MD    Visit Date: 10/7/2024    Physician Orders: PT Eval and Treat   Medical Diagnosis from Referral:   M25.511,G89.29 (ICD-10-CM) - Chronic right shoulder pain   M75.41 (ICD-10-CM) - Shoulder impingement syndrome, right   Evaluation Date: 9/30/2024  Authorization Period Expiration: 12/31/2024  Plan of Care Expiration: 11/30/2024  Progress Note Due: 10/29/2024  Visit # / Visits authorized: 1/1   FOTO: 1/3     Precautions: Standard,     PTA Visit #: 0/5     Time In: 8:03 am   Time Out: 8:55 am  Total Billable Time: 52 minutes    Subjective     Pt reports: She was in a lot fo pain after the session Thursday and she was in even more pain today fter work this weekend. Pt reports she had to take a break mid shift due to the pain. Pt also reports facial numbness today which she reports has worsened since beginning meloxicam about two weeks ago.     She was compliant with home exercise program.  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     Pain: 3/10  Location: right hands      Objective      Verterbal artery Test (L): positive (pt reported feeling "like my world was going black)   Can go through full active cervical ROM  Stays in left upper cervical sidebend   Both shoulders stay in a depressed position   First rib on R limited     Treatment     Devora received the treatments listed below:      therapeutic exercises to develop strength, endurance, ROM, flexibility, posture, and core stabilization for 0 minutes including:    Not today:  AAROM supine shoulder flexion with dowel 20x       manual therapy techniques: Joint mobilizations, Manual " "traction, Myofacial release, Manual Lymphatic Drainage, Soft tissue Mobilization, and Friction Massage were applied to the: R shoulder for 28 minutes, including:  Upper cervical assessment   Shoulder joint assessment   First rib assessment   First rib mobilization grades III-IV  Shoulder oscillations     neuromuscular re-education activities to improve: Balance, Coordination, Kinesthetic, Sense, Proprioception, and Posture for 24 minutes. The following activities were included:  Education on pain relief techniques   Ulnar nerve glides 20x  Shoulder shrugs with arm supported on pillow 10x   Wall slides with shoulder shrugs (arms tucked in closely) 2x10   Scap squeezes with arm supported on pillow 10x   Taping for scapular elevation and humeral head posterior glide to relieve neurovascular structures     Not today:  Walk outs 2x10   Chin tucks 2x10   Shoulder isometrics 4 ways 10x 10" holds   Scap squeezes 10x   No moneys RTB 2x10     therapeutic activities to improve functional performance for 0  minutes, including:        Patient Education and Home Exercises       Education provided:   - new HEP  - goals of taping  - pain relief techniques    Written Home Exercises Provided: YES. Exercises were reviewed and Devora was able to demonstrate them prior to the end of the session.  Devora demonstrated good understanding of the education provided. See EMR under Patient Instructions for exercises provided during therapy sessions    Assessment     Pt reported with extreme pain again after working this weekend and after last PT session. Pt was taken through upper cervical screening and assessment. Pt did not pass the upper cervical screen and will be referred back to physician for imaging on the upper cervical spine. Pt was not taken through upper cervical spine manual therapy due to the results of the screen, and she will not be taken through any until imaging is received. Pt was taped and given exercises to help offload " the neurovascular structures causing pain shooting down her arm.     Devora Is progressing well towards her goals.   Pt prognosis is Good.     Pt will continue to benefit from skilled outpatient physical therapy to address the deficits listed in the problem list box on initial evaluation, provide pt/family education and to maximize pt's level of independence in the home and community environment.     Pt's spiritual, cultural and educational needs considered and pt agreeable to plan of care and goals.     Anticipated barriers to physical therapy: work    GOALS:   Short Term Goals:  4 weeks  1.Report decreased R shoulder and arm pain < / =  5/10  to increase tolerance for ADLs  2. Increase PROM by 10 degrees   3. Increased strength by 1/3 MMT grade in R shoulder and periscapular area to increase tolerance for ADL and work activities.  4. Pt to tolerate HEP to improve ROM and independence with ADL's     Long Term Goals: 8 weeks  1.Report decreased R shoulder and arm pain  < / =  2 /10  to increase tolerance for painting and work  2.Increase AROM to WNL  3.Increase strength to >/= 4/5 in R shoulder and periscapular area to increase tolerance for ADL and work activities.  4. 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 Pt will have improved gcode of CJ (20-40% limited) on FOTO shoulder in order to demonstrate true functional improvement.     Plan     Plan of care Certification: 9/30/2024 to 11/30/24.     Continue POC.     Robert Arrington PT, DPT  Co-Treat with Shilo Guerra PT, DPT, OCS and Joaquin Rowan Pt, DPT, OCS, FAAOMPT

## 2024-10-08 ENCOUNTER — HOSPITAL ENCOUNTER (EMERGENCY)
Facility: OTHER | Age: 29
Discharge: HOME | End: 2024-10-09
Attending: EMERGENCY MEDICINE
Payer: COMMERCIAL

## 2024-10-08 ENCOUNTER — PATIENT MESSAGE (OUTPATIENT)
Dept: INTERNAL MEDICINE | Facility: CLINIC | Age: 29
End: 2024-10-08
Payer: COMMERCIAL
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
## 2024-10-08 DIAGNOSIS — M54.2 NECK PAIN: Primary | ICD-10-CM

## 2024-10-08 LAB
B-HCG UR QL: NEGATIVE
CTP QC/QA: YES

## 2024-10-08 PROCEDURE — 81025 URINE PREGNANCY TEST: CPT | Performed by: EMERGENCY MEDICINE

## 2024-10-08 PROCEDURE — 99284 EMERGENCY DEPT VISIT MOD MDM: CPT

## 2024-10-08 RX ORDER — AMOXICILLIN AND CLAVULANATE POTASSIUM 875; 125 MG/1; MG/1
1 TABLET, FILM COATED ORAL
Status: COMPLETED | OUTPATIENT
Start: 2024-10-09 | End: 2024-10-09

## 2024-10-08 NOTE — Clinical Note
"Devora"Austin Meeks was seen and treated in our emergency department on 10/8/2024.  She may return to work on 10/11/2024.       If you have any questions or concerns, please don't hesitate to call.      Duncan Ruggiero MD"

## 2024-10-08 NOTE — TELEPHONE ENCOUNTER
Pt was seen last minutes for shoulder pain, numbness and tingling to her neck area. Pt states this is happening 4+ days and ortho is suggesting she do additional imaging. Pt completed a recent MRI

## 2024-10-09 ENCOUNTER — TELEPHONE (OUTPATIENT)
Dept: INTERNAL MEDICINE | Facility: CLINIC | Age: 29
End: 2024-10-09
Payer: COMMERCIAL

## 2024-10-09 VITALS
WEIGHT: 125 LBS | RESPIRATION RATE: 17 BRPM | HEIGHT: 63 IN | OXYGEN SATURATION: 100 % | SYSTOLIC BLOOD PRESSURE: 126 MMHG | BODY MASS INDEX: 22.15 KG/M2 | TEMPERATURE: 99 F | HEART RATE: 79 BPM | DIASTOLIC BLOOD PRESSURE: 74 MMHG

## 2024-10-09 DIAGNOSIS — M54.2 CERVICALGIA: Primary | ICD-10-CM

## 2024-10-09 DIAGNOSIS — M53.2X2 CERVICAL SPINE INSTABILITY: ICD-10-CM

## 2024-10-09 PROCEDURE — 90715 TDAP VACCINE 7 YRS/> IM: CPT | Performed by: EMERGENCY MEDICINE

## 2024-10-09 PROCEDURE — 25000003 PHARM REV CODE 250: Performed by: EMERGENCY MEDICINE

## 2024-10-09 PROCEDURE — 63600175 PHARM REV CODE 636 W HCPCS: Performed by: EMERGENCY MEDICINE

## 2024-10-09 PROCEDURE — 90471 IMMUNIZATION ADMIN: CPT | Performed by: EMERGENCY MEDICINE

## 2024-10-09 RX ORDER — FLUCONAZOLE 200 MG/1
200 TABLET ORAL DAILY
Qty: 7 TABLET | Refills: 0 | Status: SHIPPED | OUTPATIENT
Start: 2024-10-09 | End: 2024-10-16

## 2024-10-09 RX ORDER — IBUPROFEN 600 MG/1
600 TABLET ORAL EVERY 6 HOURS PRN
Qty: 20 TABLET | Refills: 0 | Status: SHIPPED | OUTPATIENT
Start: 2024-10-09

## 2024-10-09 RX ORDER — AMOXICILLIN AND CLAVULANATE POTASSIUM 875; 125 MG/1; MG/1
1 TABLET, FILM COATED ORAL 2 TIMES DAILY
Qty: 14 TABLET | Refills: 0 | Status: SHIPPED | OUTPATIENT
Start: 2024-10-09

## 2024-10-09 RX ADMIN — TETANUS TOXOID, REDUCED DIPHTHERIA TOXOID AND ACELLULAR PERTUSSIS VACCINE, ADSORBED 0.5 ML: 5; 2.5; 8; 8; 2.5 SUSPENSION INTRAMUSCULAR at 12:10

## 2024-10-09 RX ADMIN — AMOXICILLIN AND CLAVULANATE POTASSIUM 1 TABLET: 875; 125 TABLET, FILM COATED ORAL at 12:10

## 2024-10-09 NOTE — DISCHARGE INSTRUCTIONS
Mrs. Meeks,    Thank you for letting me care for you today! It was nice meeting you, and I hope you feel better soon.   If you would like access to your chart and what was done today please utilize the [facility].   Please come back to [facility] for all of your future medical needs.    Our goal in the emergency department is to always give you outstanding care and exceptional service. You may receive a survey by mail or e-mail in the next week regarding your experience in our ED. We would greatly appreciate you completing and returning the survey. Your feedback provides us with a way to recognize our staff who give very good care and it helps us learn how to improve when your experience was below our aspiration of excellence.     Sincerely,    Duncan Ruggiero MD  Board Certified Emergency Physician
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
## 2024-10-09 NOTE — TELEPHONE ENCOUNTER
----- Message from PT Robert sent at 10/8/2024  4:20 PM CDT -----  Regarding: RE: Patient update  Yes MRI needed as well. Possibly a doppler for vertebral artery sufficiency too. Thank you for ordering imaging.  ----- Message -----  From: Anastasia Sung MD  Sent: 10/8/2024   3:52 PM CDT  To: Robert Arrington PT  Subject: RE: Patient update                               Thanks for the update. I ordered cspine xray; is MRI needed as well?  ----- Message -----  From: Robert Arrington PT  Sent: 10/7/2024   3:24 PM CDT  To: Anastasia Sung MD; Joaquin Rowan, PEDRO  Subject: Patient update                                   Dr. Sung,   This is Robert Arrington, and I am a PT at [facility]. I have been the primary PT on Devora Meeks's case. She reported feeling facial numbness this morning upon arrival, so we took her through upper cervical stability testing. We found some laxity in the transverse ligament and a positive vestibular artery test to the L (she reported feeling like her world was going black). In these situations, we typically request imaging before treating the upper cervical spine. We adjusted today's treatment to treat the R neurovascular bundle in the shoulder, but we would like to be cautious and wait for imaging before treating the upper cervical spine. Devora messaged the orthopedic PA, and they told her to reach out to her PCP.   Thanks,   Robert Mg PT, DPT
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
## 2024-10-10 ENCOUNTER — HOSPITAL ENCOUNTER (OUTPATIENT)
Dept: RADIOLOGY | Facility: OTHER | Age: 29
Discharge: HOME | End: 2024-10-10
Attending: NEUROMUSCULOSKELETAL MEDICINE & OMM
Payer: COMMERCIAL

## 2024-10-10 ENCOUNTER — CLINICAL SUPPORT (OUTPATIENT)
Dept: REHABILITATION | Facility: HOSPITAL | Age: 29
End: 2024-10-10
Payer: COMMERCIAL

## 2024-10-10 DIAGNOSIS — M25.611 DECREASED RANGE OF MOTION OF RIGHT SHOULDER: Primary | ICD-10-CM

## 2024-10-10 DIAGNOSIS — M54.2 NECK PAIN: ICD-10-CM

## 2024-10-10 DIAGNOSIS — M25.552 LEFT HIP PAIN: ICD-10-CM

## 2024-10-10 DIAGNOSIS — R29.898 DECREASED GRIP STRENGTH OF RIGHT HAND: ICD-10-CM

## 2024-10-10 PROCEDURE — 73502 X-RAY EXAM HIP UNI 2-3 VIEWS: CPT | Mod: 26,LT,, | Performed by: RADIOLOGY

## 2024-10-10 PROCEDURE — 72040 X-RAY EXAM NECK SPINE 2-3 VW: CPT | Mod: TC,FY

## 2024-10-10 PROCEDURE — 97112 NEUROMUSCULAR REEDUCATION: CPT

## 2024-10-10 PROCEDURE — 72040 X-RAY EXAM NECK SPINE 2-3 VW: CPT | Mod: 26,,, | Performed by: RADIOLOGY

## 2024-10-10 PROCEDURE — 73502 X-RAY EXAM HIP UNI 2-3 VIEWS: CPT | Mod: TC,FY,LT

## 2024-10-10 NOTE — PROGRESS NOTES
[facility]   Physical Therapy Treatment Note      Name: Devora [facility]ustin [facility]  Clinic Number: [id number]    Therapy Diagnosis:   Encounter Diagnoses   Name Primary?    Decreased range of motion of right shoulder Yes    Decreased  strength of right hand          Physician: Anastasia Sung MD    Visit Date: 10/10/2024    Physician Orders: PT Eval and Treat   Medical Diagnosis from Referral:   M25.511,G89.29 (ICD-10-CM) - Chronic right shoulder pain   M75.41 (ICD-10-CM) - Shoulder impingement syndrome, right   Evaluation Date: 9/30/2024  Authorization Period Expiration: 12/31/2024  Plan of Care Expiration: 11/30/2024  Progress Note Due: 10/29/2024  Visit # / Visits authorized: 2/20 (3 total)  FOTO: 1/3     Precautions: Standard,     PTA Visit #: 0/5     Time In: 6:01 pm   Time Out: 6:40 pm  Total Billable Time: 39 minutes    Subjective     Pt reports: the tape made it feel much better and she is feeling a little better today. Pt reports she had a headache and was nauseous for a day following the vertebral artery clearing test on Monday.     She was compliant with home exercise program.  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     Treatment     Devora received the treatments listed below:      therapeutic exercises to develop strength, endurance, ROM, flexibility, posture, and core stabilization for 0 minutes including:    manual therapy techniques: Joint mobilizations, Manual traction, Myofacial release, Manual Lymphatic Drainage, Soft tissue Mobilization, and Friction Massage were applied to the: R shoulder for 0 minutes, including:    Not today:  Upper cervical assessment   Shoulder joint assessment   First rib assessment   First rib mobilization grades III-IV  Shoulder oscillations  "    neuromuscular re-education activities to improve: Balance, Coordination, Kinesthetic, Sense, Proprioception, and Posture for 39 minutes. The following activities were included:  Education on pain relief techniques   Ulnar nerve glides 20x  Shoulder shrugs with arm supported on pillow 10x   Wall slides with shoulder shrugs (arms tucked in closely) 2x10   Scap squeezes with arm supported on pillow 10x   Taping for scapular elevation and humeral head posterior glide to relieve neurovascular structures     Not today:  Walk outs 2x10   Chin tucks 2x10   Shoulder isometrics 4 ways 10x 10" holds   Scap squeezes 10x   No moneys RTB 2x10     therapeutic activities to improve functional performance for 0  minutes, including:        Patient Education and Home Exercises       Education provided:   - new HEP  - goals of taping  - pain relief techniques    Written Home Exercises Provided: YES. Exercises were reviewed and Devora was able to demonstrate them prior to the end of the session.  Devora demonstrated good understanding of the education provided. See EMR under Patient Instructions for exercises provided during therapy sessions    Assessment     Pt reported with less pain this visit due to tape, exercises, and less work. Pt is scheduled for X-Ray of the cervical spine and is waiting to be scheduled for MRI and doppler or MRA. Pt tolerated treatments with minimal increases in pain today.     Devora Is progressing well towards her goals.   Pt prognosis is Good.     Pt will continue to benefit from skilled outpatient physical therapy to address the deficits listed in the problem list box on initial evaluation, provide pt/family education and to maximize pt's level of independence in the home and community environment.     Pt's spiritual, cultural and educational needs considered and pt agreeable to plan of care and goals.     Anticipated barriers to physical therapy: work    GOALS:   Short Term Goals:  4 " weeks  1.Report decreased R shoulder and arm pain < / =  5/10  to increase tolerance for ADLs  2. Increase PROM by 10 degrees   3. Increased strength by 1/3 MMT grade in R shoulder and periscapular area to increase tolerance for ADL and work activities.  4. Pt to tolerate HEP to improve ROM and independence with ADL's     Long Term Goals: 8 weeks  1.Report decreased R shoulder and arm pain  < / =  2 /10  to increase tolerance for painting and work  2.Increase AROM to WNL  3.Increase strength to >/= 4/5 in R shoulder and periscapular area to increase tolerance for ADL and work activities.  4. 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 Pt will have improved gcode of CJ (20-40% limited) on FOTO shoulder in order to demonstrate true functional improvement.     Plan     Plan of care Certification: 9/30/2024 to 11/30/24.     Continue POC.     Robert Sculthojonathan PT, DPT  Co-treat with Wale Brunson Pt, DPT, OCS, FAAOMPT

## 2024-10-11 ENCOUNTER — PATIENT MESSAGE (OUTPATIENT)
Dept: RADIOLOGY | Facility: OTHER | Age: 29
End: 2024-10-11
Payer: COMMERCIAL

## 2024-10-14 ENCOUNTER — CLINICAL SUPPORT (OUTPATIENT)
Dept: REHABILITATION | Facility: HOSPITAL | Age: 29
End: 2024-10-14
Payer: COMMERCIAL

## 2024-10-14 DIAGNOSIS — M25.611 DECREASED RANGE OF MOTION OF RIGHT SHOULDER: Primary | ICD-10-CM

## 2024-10-14 DIAGNOSIS — R29.898 DECREASED GRIP STRENGTH OF RIGHT HAND: ICD-10-CM

## 2024-10-14 PROCEDURE — 97112 NEUROMUSCULAR REEDUCATION: CPT

## 2024-10-14 NOTE — PROGRESS NOTES
[facility]   Physical Therapy Treatment Note      Name: Devora Agustin [facility]  Clinic Number: [id number]    Therapy Diagnosis:   Encounter Diagnoses   Name Primary?    Decreased range of motion of right shoulder Yes    Decreased  strength of right hand      Physician: Anastasia Sung MD    Visit Date: 10/14/2024    Physician Orders: PT Eval and Treat   Medical Diagnosis from Referral:   M25.511,G89.29 (ICD-10-CM) - Chronic right shoulder pain   M75.41 (ICD-10-CM) - Shoulder impingement syndrome, right   Evaluation Date: 9/30/2024  Authorization Period Expiration: 12/31/2024  Plan of Care Expiration: 11/30/2024  Progress Note Due: 10/29/2024  Visit # / Visits authorized: 4/20  FOTO: 1/3     Precautions: Standard,     PTA Visit #: 0/5     Time In: 9:03  am   Time Out: 9:45 am  Total Billable Time: 42 minutes    Subjective     Pt reports: Her arm is feeling better, and the tape is working. She only had symptoms in her hand today    She was compliant with home exercise program.  Response to previous treatment: decreased pain   Functional change: able to lift arm easier    Pain: 3/10  Location: right hands      Objective      Objective tests will be taken at progress report unless otherwise noted.     Treatment     Devora received the treatments listed below:      therapeutic exercises to develop strength, endurance, ROM, flexibility, posture, and core stabilization for 0 minutes including:    manual therapy techniques: Joint mobilizations, Manual traction, Myofacial release, Manual Lymphatic Drainage, Soft tissue Mobilization, and Friction Massage were applied to the: R shoulder for 0 minutes, including:    Not today:  Upper cervical assessment   Shoulder joint assessment   First rib assessment   First rib mobilization grades III-IV  Shoulder oscillations     neuromuscular re-education activities to improve: Balance, Coordination, Kinesthetic, Sense, Proprioception, and Posture for  "42 minutes. The following activities were included:  Education on pain relief techniques   Ulnar nerve glides 20x  Shoulder shrugs with arm supported on pillow 10x   Wall slides with shoulder shrugs (arms tucked in closely) 2x10   Scap squeezes with arm supported on pillow 10x   Low trap isometric activation 20x 2 sec holds   Taping for scapular elevation and humeral head posterior glide to relieve neurovascular structures     Not today:  Walk outs 2x10   Chin tucks 2x10   Shoulder isometrics 4 ways 10x 10" holds   Scap squeezes 10x   No moneys RTB 2x10     therapeutic activities to improve functional performance for 0  minutes, including:        Patient Education and Home Exercises       Education provided:   - new HEP  - goals of taping  - pain relief techniques    Written Home Exercises Provided: YES. Exercises were reviewed and Devora was able to demonstrate them prior to the end of the session.  Devora demonstrated good understanding of the education provided. See EMR under Patient Instructions for exercises provided during therapy sessions    Assessment     Pt reported with less pain this visit due to tape, exercises, and less work. Pt is scheduled for MRA and MRI next week. Pt has shown decreased pain and increased tolerance for exercises since beginning therapy and taping. Pt's main deficits causing pain are likely shoulder depression and anterior postioning of the humeral head.  Pt tolerated treatments with minimal increases in pain today. Pt was progressed with low trap activation and will continue to be progressed as tolerated.     Devora Is progressing well towards her goals.   Pt prognosis is Good.     Pt will continue to benefit from skilled outpatient physical therapy to address the deficits listed in the problem list box on initial evaluation, provide pt/family education and to maximize pt's level of independence in the home and community environment.     Pt's spiritual, cultural and educational " needs considered and pt agreeable to plan of care and goals.     Anticipated barriers to physical therapy: work    GOALS:   Short Term Goals:  4 weeks  1.Report decreased R shoulder and arm pain < / =  5/10  to increase tolerance for ADLs  2. Increase PROM by 10 degrees   3. Increased strength by 1/3 MMT grade in R shoulder and periscapular area to increase tolerance for ADL and work activities.  4. Pt to tolerate HEP to improve ROM and independence with ADL's     Long Term Goals: 8 weeks  1.Report decreased R shoulder and arm pain  < / =  2 /10  to increase tolerance for painting and work  2.Increase AROM to WNL  3.Increase strength to >/= 4/5 in R shoulder and periscapular area to increase tolerance for ADL and work activities.  4. 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 Pt will have improved gcode of CJ (20-40% limited) on FOTO shoulder in order to demonstrate true functional improvement.     Plan     Plan of care Certification: 9/30/2024 to 11/30/24.     Continue POC.     Robert Sculthorp PT, DPT

## 2024-10-17 ENCOUNTER — CLINICAL SUPPORT (OUTPATIENT)
Dept: REHABILITATION | Facility: HOSPITAL | Age: 29
End: 2024-10-17
Payer: COMMERCIAL

## 2024-10-17 DIAGNOSIS — R29.898 DECREASED GRIP STRENGTH OF RIGHT HAND: ICD-10-CM

## 2024-10-17 DIAGNOSIS — M25.611 DECREASED RANGE OF MOTION OF RIGHT SHOULDER: Primary | ICD-10-CM

## 2024-10-17 PROCEDURE — 97112 NEUROMUSCULAR REEDUCATION: CPT

## 2024-10-17 NOTE — PROGRESS NOTES
[facility]   Physical Therapy Treatment Note      [facility]  [facility] [id number]    Therapy Diagnosis:   Encounter Diagnoses   Name Primary?    Decreased range of motion of right shoulder Yes    Decreased  strength of right hand        Physician: Anastasia Sung MD    Visit Date: 10/17/2024    Physician Orders: PT Eval and Treat   Medical Diagnosis from Referral:   M25.511,G89.29 (ICD-10-CM) - Chronic right shoulder pain   M75.41 (ICD-10-CM) - Shoulder impingement syndrome, right   Evaluation Date: 9/30/2024  Authorization Period Expiration: 12/31/2024  Plan of Care Expiration: 11/30/2024  Progress Note Due: 10/29/2024  Visit # / Visits authorized: 5/20  FOTO: 1/3     Precautions: Standard,     PTA Visit #: 0/5     Time In: 5:59  pm   Time Out: 6:39 pm  Total Billable Time: 40 minutes    Subjective     Pt reports: she had bad pain last night and struggled to sleep after the tape came off, but her arm and hand felt a little better today. Pt reports she did have facial numbness today.     She was compliant with home exercise program.  Response to previous treatment: decreased pain   Functional change: able to lift arm easier    Pain: 3/10  Location: right hands      Objective      Objective tests will be taken at progress report unless otherwise noted.     Treatment     Devora received the treatments listed below:      therapeutic exercises to develop strength, endurance, ROM, flexibility, posture, and core stabilization for 0 minutes including:    manual therapy techniques: Joint mobilizations, Manual traction, Myofacial release, Manual Lymphatic Drainage, Soft tissue Mobilization, and Friction Massage were applied to the: R shoulder for 0 minutes, including:    Not today:  Upper cervical assessment   Shoulder joint assessment   First rib assessment   First rib mobilization grades III-IV  Shoulder oscillations     neuromuscular re-education activities to  "improve: Balance, Coordination, Kinesthetic, Sense, Proprioception, and Posture for 40 minutes. The following activities were included:  Education on pain relief techniques   Ulnar nerve glides 20x  Shoulder shrugs with arm supported on pillow 10x   Wall slides with shoulder shrugs (arms tucked in closely) 2x10   Scap squeezes with arm supported on pillow 10x   Low trap isometric activation 20x 2 sec holds   Taping for scapular elevation and humeral head posterior glide to relieve neurovascular structures     Not today:  Walk outs 2x10   Chin tucks 2x10   Shoulder isometrics 4 ways 10x 10" holds   Scap squeezes 10x   No moneys RTB 2x10     therapeutic activities to improve functional performance for 0  minutes, including:        Patient Education and Home Exercises       Education provided:   - new HEP  - goals of taping  - pain relief techniques    Written Home Exercises Provided: YES. Exercises were reviewed and Devora was able to demonstrate them prior to the end of the session.  Devora demonstrated good understanding of the education provided. See EMR under Patient Instructions for exercises provided during therapy sessions    Assessment     Pt is still having pain when the tape comes off, but she is doing better since she began therapy. Pt is having less pain and irritability with the tape on, but she does still have bad flare ups and significant pain with too much movement of the arm. Pt is scheduled for MRI and MRA next Wednesday (10/23) to check cervical spine stability and vertebral artery. Pt will be progressed after MRI and MRA.       Devora Is progressing well towards her goals.   Pt prognosis is Good.     Pt will continue to benefit from skilled outpatient physical therapy to address the deficits listed in the problem list box on initial evaluation, provide pt/family education and to maximize pt's level of independence in the home and community environment.     Pt's spiritual, cultural and " educational needs considered and pt agreeable to plan of care and goals.     Anticipated barriers to physical therapy: work    GOALS:   Short Term Goals:  4 weeks  1.Report decreased R shoulder and arm pain < / =  5/10  to increase tolerance for ADLs  2. Increase PROM by 10 degrees   3. Increased strength by 1/3 MMT grade in R shoulder and periscapular area to increase tolerance for ADL and work activities.  4. Pt to tolerate HEP to improve ROM and independence with ADL's     Long Term Goals: 8 weeks  1.Report decreased R shoulder and arm pain  < / =  2 /10  to increase tolerance for painting and work  2.Increase AROM to WNL  3.Increase strength to >/= 4/5 in R shoulder and periscapular area to increase tolerance for ADL and work activities.  4. 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 Pt will have improved gcode of CJ (20-40% limited) on FOTO shoulder in order to demonstrate true functional improvement.     Plan     Plan of care Certification: 9/30/2024 to 11/30/24.     Continue POC.     Robert Sculthorp PT, DPT

## 2024-10-23 ENCOUNTER — HOSPITAL ENCOUNTER (OUTPATIENT)
Dept: RADIOLOGY | Facility: OTHER | Age: 29
Discharge: HOME | End: 2024-10-23
Attending: INTERNAL MEDICINE
Payer: COMMERCIAL

## 2024-10-23 DIAGNOSIS — M53.2X2 CERVICAL SPINE INSTABILITY: ICD-10-CM

## 2024-10-23 DIAGNOSIS — M54.2 CERVICALGIA: ICD-10-CM

## 2024-10-23 PROCEDURE — 25500020 PHARM REV CODE 255: Performed by: INTERNAL MEDICINE

## 2024-10-23 PROCEDURE — 70549 MR ANGIOGRAPH NECK W/O&W/DYE: CPT | Mod: TC

## 2024-10-23 PROCEDURE — 72156 MRI NECK SPINE W/O & W/DYE: CPT | Mod: 26,,, | Performed by: RADIOLOGY

## 2024-10-23 PROCEDURE — 72156 MRI NECK SPINE W/O & W/DYE: CPT | Mod: TC

## 2024-10-23 PROCEDURE — A9585 GADOBUTROL INJECTION: HCPCS | Performed by: INTERNAL MEDICINE

## 2024-10-23 RX ORDER — GADOBUTROL 604.72 MG/ML
6 INJECTION INTRAVENOUS
Status: COMPLETED | OUTPATIENT
Start: 2024-10-23 | End: 2024-10-23

## 2024-10-23 RX ADMIN — GADOBUTROL 6 ML: 604.72 INJECTION INTRAVENOUS at 07:10

## 2024-10-24 ENCOUNTER — CLINICAL SUPPORT (OUTPATIENT)
Dept: REHABILITATION | Facility: HOSPITAL | Age: 29
End: 2024-10-24
Payer: COMMERCIAL

## 2024-10-24 ENCOUNTER — PATIENT MESSAGE (OUTPATIENT)
Dept: INTERNAL MEDICINE | Facility: CLINIC | Age: 29
End: 2024-10-24
Payer: COMMERCIAL

## 2024-10-24 DIAGNOSIS — M25.611 DECREASED RANGE OF MOTION OF RIGHT SHOULDER: Primary | ICD-10-CM

## 2024-10-24 DIAGNOSIS — R29.898 DECREASED GRIP STRENGTH OF RIGHT HAND: Primary | ICD-10-CM

## 2024-10-24 DIAGNOSIS — S14.3XXD INJURY OF BRACHIAL PLEXUS, SUBSEQUENT ENCOUNTER: ICD-10-CM

## 2024-10-24 DIAGNOSIS — R29.898 DECREASED GRIP STRENGTH OF RIGHT HAND: ICD-10-CM

## 2024-10-24 PROCEDURE — 97140 MANUAL THERAPY 1/> REGIONS: CPT

## 2024-10-24 PROCEDURE — 97112 NEUROMUSCULAR REEDUCATION: CPT

## 2024-10-24 NOTE — PROGRESS NOTES
[facility]   Physical Therapy Treatment Note      Name: Devora Agustin [facility]  Clinic Number: [id number]    Therapy Diagnosis:   Encounter Diagnoses   Name Primary?    Decreased range of motion of right shoulder Yes    Decreased  strength of right hand          Physician: Anastasia Sung MD    Visit Date: 10/24/2024    Physician Orders: PT Eval and Treat   Medical Diagnosis from Referral:   M25.511,G89.29 (ICD-10-CM) - Chronic right shoulder pain   M75.41 (ICD-10-CM) - Shoulder impingement syndrome, right   Evaluation Date: 9/30/2024  Authorization Period Expiration: 12/31/2024  Plan of Care Expiration: 11/30/2024  Progress Note Due: 10/29/2024  Visit # / Visits authorized: 6/20  FOTO: 1/3     Precautions: Standard,     PTA Visit #: 0/5     Time In: 6:03  pm   Time Out: 6:35 pm  Total Billable Time: 32 minutes    Subjective     Pt reports: her pain was worse today but she was a 3/10 until Monday and she reports lifting some stuff at work that she should not have lifted. Pt reports she could feel the difference without tape.      She was compliant with home exercise program.  Response to previous treatment: decreased pain   Functional change: able to lift arm easier    Pain: 6/10  Location: right hands      Objective      Objective tests will be taken at progress report unless otherwise noted.     Treatment     Devora received the treatments listed below:      therapeutic exercises to develop strength, endurance, ROM, flexibility, posture, and core stabilization for 0 minutes including:    manual therapy techniques: Joint mobilizations, Manual traction, Myofacial release, Manual Lymphatic Drainage, Soft tissue Mobilization, and Friction Massage were applied to the: R shoulder for 8 minutes, including:  Upper cervical assessment   First rib assessment     Not today:  Shoulder joint assessment   First rib mobilization grades III-IV  Shoulder oscillations     neuromuscular  "re-education activities to improve: Balance, Coordination, Kinesthetic, Sense, Proprioception, and Posture for 24 minutes. The following activities were included:  Education on pain relief techniques   Ulnar nerve glides 20x  Shoulder shrugs with arm supported on pillow 10x   Wall slides with shoulder shrugs (arms tucked in closely) 2x10   Scap squeezes with arm supported on pillow 10x   Low trap isometric activation 20x 2 sec holds   Taping for scapular elevation and humeral head posterior glide to relieve neurovascular structures     Not today:  Walk outs 2x10   Chin tucks 2x10   Shoulder isometrics 4 ways 10x 10" holds   Scap squeezes 10x   No moneys RTB 2x10     therapeutic activities to improve functional performance for 0  minutes, including:        Patient Education and Home Exercises       Education provided:   - new HEP  - goals of taping  - pain relief techniques    Written Home Exercises Provided: YES. Exercises were reviewed and Devora was able to demonstrate them prior to the end of the session.  Devora demonstrated good understanding of the education provided. See EMR under Patient Instructions for exercises provided during therapy sessions    Assessment     Pt is still having pain when the tape comes off, but she is doing better since she began therapy. Pt is having less pain and irritability with the tape on, but she does still have bad flare ups and significant pain with too much movement of the arm or lifting too heavy. Pt was cleared with MRI and MRA today, so cervical spine was reassessed today. Exercises were not added today due to a long weekend upcoming at work and preventing a flare up before she has to work a lot this weekend. Pt was taped nd completed normal exercises that have shown to relieve her pain.     Devora Is progressing well towards her goals.   Pt prognosis is Good.     Pt will continue to benefit from skilled outpatient physical therapy to address the deficits listed in the " problem list box on initial evaluation, provide pt/family education and to maximize pt's level of independence in the home and community environment.     Pt's spiritual, cultural and educational needs considered and pt agreeable to plan of care and goals.     Anticipated barriers to physical therapy: work    GOALS:   Short Term Goals:  4 weeks  1.Report decreased R shoulder and arm pain < / =  5/10  to increase tolerance for ADLs  2. Increase PROM by 10 degrees   3. Increased strength by 1/3 MMT grade in R shoulder and periscapular area to increase tolerance for ADL and work activities.  4. Pt to tolerate HEP to improve ROM and independence with ADL's     Long Term Goals: 8 weeks  1.Report decreased R shoulder and arm pain  < / =  2 /10  to increase tolerance for painting and work  2.Increase AROM to WNL  3.Increase strength to >/= 4/5 in R shoulder and periscapular area to increase tolerance for ADL and work activities.  4. 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 Pt will have improved gcode of CJ (20-40% limited) on FOTO shoulder in order to demonstrate true functional improvement.     Plan     Plan of care Certification: 9/30/2024 to 11/30/24.     Continue POC.     Robert Sculthorp PT, DPT

## 2024-10-30 ENCOUNTER — PATIENT MESSAGE (OUTPATIENT)
Dept: REHABILITATION | Facility: HOSPITAL | Age: 29
End: 2024-10-30
Payer: COMMERCIAL

## 2024-10-31 ENCOUNTER — CLINICAL SUPPORT (OUTPATIENT)
Dept: REHABILITATION | Facility: HOSPITAL | Age: 29
End: 2024-10-31
Payer: COMMERCIAL

## 2024-10-31 DIAGNOSIS — R29.898 DECREASED GRIP STRENGTH OF RIGHT HAND: ICD-10-CM

## 2024-10-31 DIAGNOSIS — M25.611 DECREASED RANGE OF MOTION OF RIGHT SHOULDER: Primary | ICD-10-CM

## 2024-10-31 PROCEDURE — 97112 NEUROMUSCULAR REEDUCATION: CPT

## 2024-10-31 PROCEDURE — 97140 MANUAL THERAPY 1/> REGIONS: CPT

## 2024-11-04 ENCOUNTER — CLINICAL SUPPORT (OUTPATIENT)
Dept: REHABILITATION | Facility: HOSPITAL | Age: 29
End: 2024-11-04
Payer: COMMERCIAL

## 2024-11-04 DIAGNOSIS — R29.898 DECREASED GRIP STRENGTH OF RIGHT HAND: ICD-10-CM

## 2024-11-04 DIAGNOSIS — M25.611 DECREASED RANGE OF MOTION OF RIGHT SHOULDER: Primary | ICD-10-CM

## 2024-11-04 PROCEDURE — 97112 NEUROMUSCULAR REEDUCATION: CPT

## 2024-11-04 PROCEDURE — 97140 MANUAL THERAPY 1/> REGIONS: CPT

## 2024-11-04 NOTE — PROGRESS NOTES
[facility]   Physical Therapy Treatment Note      Name: Devora Agustin [facility]  Clinic Number: [id number]    Therapy Diagnosis:   Encounter Diagnoses   Name Primary?    Decreased range of motion of right shoulder Yes    Decreased  strength of right hand        Physician: Anastasia Sung MD    Visit Date: 11/4/2024    Physician Orders: PT Eval and Treat   Medical Diagnosis from Referral:   M25.511,G89.29 (ICD-10-CM) - Chronic right shoulder pain   M75.41 (ICD-10-CM) - Shoulder impingement syndrome, right   Evaluation Date: 9/30/2024  Authorization Period Expiration: 12/31/2024  Plan of Care Expiration: 11/30/2024  Progress Note Due: 10/29/2024  Visit # / Visits authorized: 8/20  FOTO: 1/3     Precautions: Standard,     PTA Visit #: 0/5     Time In: 8:05 am   Time Out: 8:40 am  Total Billable Time: 35 minutes    Subjective     Pt reports: She was feeling alright today; she was mainly sore from this weekend at work.     She was compliant with home exercise program.  Response to previous treatment: decreased pain   Functional change: able to lift arm easier    Pain: 6/10  Location: right hands      Objective      Objective tests will be taken at progress report unless otherwise noted.     Treatment     Devora received the treatments listed below:      therapeutic exercises to develop strength, endurance, ROM, flexibility, posture, and core stabilization for 0 minutes including:    manual therapy techniques: Joint mobilizations, Manual traction, Myofacial release, Manual Lymphatic Drainage, Soft tissue Mobilization, and Friction Massage were applied to the: R shoulder for 10 minutes, including:  Upper cervical assessment   CT junction distraction   Thoracic gapping, grades I-II    Not today:  First rib assessment   First Rib mobilizations   Shoulder joint assessment   First rib mobilization grades III-IV  Shoulder oscillations     neuromuscular re-education activities to improve:  "Balance, Coordination, Kinesthetic, Sense, Proprioception, and Posture for 25 minutes. The following activities were included:  Education on pain relief techniques   Ulnar nerve glides 20x  Shoulder shrugs with arm supported on pillow 10x   Wall slides with shoulder shrugs (arms tucked in closely) 2x10   Scap squeezes with arm supported on pillow 10x   Chin tucks 20x 5" holds   Open books 20x3" holds   Low trap isometric activation 20x 2" holds   Taping for scapular elevation and humeral head posterior glide to relieve neurovascular structures       therapeutic activities to improve functional performance for 0  minutes, including:        Patient Education and Home Exercises       Education provided:   - new HEP  - goals of taping  - pain relief techniques  - slow progression of sessions     Written Home Exercises Provided: YES. Exercises were reviewed and Devora was able to demonstrate them prior to the end of the session.  Devora demonstrated good understanding of the education provided. See EMR under Patient Instructions for exercises provided during therapy sessions    Assessment     Pt continues to make progress with her pain, and she is responding well to the taping. Thoracic mobility exercises were added today which she responded well to. Pt reported that adding chin tucks last time did not increase her symptoms. Pt will report back symptoms next week and add open books to her home exercise plan if they do not cause pain. Pt will continue to progress as tolerated.     Devora Is progressing well towards her goals.   Pt prognosis is Good.     Pt will continue to benefit from skilled outpatient physical therapy to address the deficits listed in the problem list box on initial evaluation, provide pt/family education and to maximize pt's level of independence in the home and community environment.     Pt's spiritual, cultural and educational needs considered and pt agreeable to plan of care and goals.   "   Anticipated barriers to physical therapy: work    GOALS:   Short Term Goals:  4 weeks  1.Report decreased R shoulder and arm pain < / =  5/10  to increase tolerance for ADLs  2. Increase PROM by 10 degrees   3. Increased strength by 1/3 MMT grade in R shoulder and periscapular area to increase tolerance for ADL and work activities.  4. Pt to tolerate HEP to improve ROM and independence with ADL's     Long Term Goals: 8 weeks  1.Report decreased R shoulder and arm pain  < / =  2 /10  to increase tolerance for painting and work  2.Increase AROM to WNL  3.Increase strength to >/= 4/5 in R shoulder and periscapular area to increase tolerance for ADL and work activities.  4. 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 Pt will have improved gcode of CJ (20-40% limited) on FOTO shoulder in order to demonstrate true functional improvement.     Plan     Plan of care Certification: 9/30/2024 to 11/30/24.     Continue POC.     Robert Evansulthojonathan PT, DPT

## 2024-11-11 ENCOUNTER — CLINICAL SUPPORT (OUTPATIENT)
Dept: REHABILITATION | Facility: HOSPITAL | Age: 29
End: 2024-11-11
Payer: COMMERCIAL

## 2024-11-11 DIAGNOSIS — M25.611 DECREASED RANGE OF MOTION OF RIGHT SHOULDER: Primary | ICD-10-CM

## 2024-11-11 DIAGNOSIS — R29.898 DECREASED GRIP STRENGTH OF RIGHT HAND: ICD-10-CM

## 2024-11-11 PROCEDURE — 97112 NEUROMUSCULAR REEDUCATION: CPT

## 2024-11-11 PROCEDURE — 97140 MANUAL THERAPY 1/> REGIONS: CPT

## 2024-11-11 NOTE — PROGRESS NOTES
[facility]   Physical Therapy Treatment Note      Name: Devora Agustin [facility]  Clinic Number: [id number]    Therapy Diagnosis:   Encounter Diagnoses   Name Primary?    Decreased range of motion of right shoulder Yes    Decreased  strength of right hand      Physician: Anastasia Sung MD    Visit Date: 11/11/2024    Physician Orders: PT Eval and Treat   Medical Diagnosis from Referral:   M25.511,G89.29 (ICD-10-CM) - Chronic right shoulder pain   M75.41 (ICD-10-CM) - Shoulder impingement syndrome, right   Evaluation Date: 9/30/2024  Authorization Period Expiration: 12/31/2024  Plan of Care Expiration: 11/30/2024  Progress Note Due: 10/29/2024  Visit # / Visits authorized: 9/20  FOTO: 1/3     Precautions: Standard,     PTA Visit #: 0/5     Time In: 8:04 am   Time Out: 8:51 am  Total Billable Time: 47 minutes    Subjective     Pt reports: She is starting to feel better and is having less symptoms than when she began therapy. She had some numbness and tingling after last night after reading for a long time with her elbows bent.     She was compliant with home exercise program.  Response to previous treatment: decreased pain   Functional change: able to lift arm easier    Pain: 6/10  Location: right hands      Objective      Objective tests will be taken at progress report unless otherwise noted.     Treatment     Devora received the treatments listed below:      therapeutic exercises to develop strength, endurance, ROM, flexibility, posture, and core stabilization for 0 minutes including:    manual therapy techniques: Joint mobilizations, Manual traction, Myofacial release, Manual Lymphatic Drainage, Soft tissue Mobilization, and Friction Massage were applied to the: R shoulder for 8 minutes, including:  Upper cervical assessment   CT junction distraction   Thoracic gapping, grades I-II  CT junction AP glides seated with passive CT extension     Not today:  First rib assessment  "  First Rib mobilizations   Shoulder joint assessment   First rib mobilization grades III-IV  Shoulder oscillations     neuromuscular re-education activities to improve: Balance, Coordination, Kinesthetic, Sense, Proprioception, and Posture for 39 minutes. The following activities were included:  Education on pain relief techniques   Ulnar nerve glides 20x  Shoulder shrugs with arm supported on pillow 10x   Wall slides with shoulder shrugs (arms tucked in closely) 2x10   Scap squeezes with arm supported on pillow 10x   Chin tucks 20x 5" holds   Open books 20x3" holds   Low trap isometric activation 20x 2" holds   Supine thoracic extension over foam roll 20x   Hand heel rocks w/ chin tuck 20x   Taping for scapular elevation and humeral head posterior glide to relieve neurovascular structures       therapeutic activities to improve functional performance for 0  minutes, including:        Patient Education and Home Exercises       Education provided:   - new HEP  - goals of taping  - pain relief techniques  - slow progression of sessions     Written Home Exercises Provided: YES. Exercises were reviewed and Devora was able to demonstrate them prior to the end of the session.  Devora demonstrated good understanding of the education provided. See EMR under Patient Instructions for exercises provided during therapy sessions    Assessment     Pt continues to make progress with her pain, and she is responding well to the taping. Pt was progressed with thoracic mobility and CT junction mobility today. Pt was given had heel rocks with chin tucks today to work on serratus and deep neck flexor strength with overhead movements. Pt tolerated all progressions well with no increase in pain. Pt will continue to progress as tolerated.     Devora Is progressing well towards her goals.   Pt prognosis is Good.     Pt will continue to benefit from skilled outpatient physical therapy to address the deficits listed in the problem list " box on initial evaluation, provide pt/family education and to maximize pt's level of independence in the home and community environment.     Pt's spiritual, cultural and educational needs considered and pt agreeable to plan of care and goals.     Anticipated barriers to physical therapy: work    GOALS:   Short Term Goals:  4 weeks  1.Report decreased R shoulder and arm pain < / =  5/10  to increase tolerance for ADLs  2. Increase PROM by 10 degrees   3. Increased strength by 1/3 MMT grade in R shoulder and periscapular area to increase tolerance for ADL and work activities.  4. Pt to tolerate HEP to improve ROM and independence with ADL's     Long Term Goals: 8 weeks  1.Report decreased R shoulder and arm pain  < / =  2 /10  to increase tolerance for painting and work  2.Increase AROM to WNL  3.Increase strength to >/= 4/5 in R shoulder and periscapular area to increase tolerance for ADL and work activities.  4. 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 Pt will have improved gcode of CJ (20-40% limited) on FOTO shoulder in order to demonstrate true functional improvement.     Plan     Plan of care Certification: 9/30/2024 to 11/30/24.     Continue POC.     Robert Sculthorp PT, DPT

## 2024-11-14 ENCOUNTER — CLINICAL SUPPORT (OUTPATIENT)
Dept: REHABILITATION | Facility: HOSPITAL | Age: 29
End: 2024-11-14
Payer: COMMERCIAL

## 2024-11-14 DIAGNOSIS — M25.611 DECREASED RANGE OF MOTION OF RIGHT SHOULDER: Primary | ICD-10-CM

## 2024-11-14 DIAGNOSIS — R29.898 DECREASED GRIP STRENGTH OF RIGHT HAND: ICD-10-CM

## 2024-11-14 PROCEDURE — 97112 NEUROMUSCULAR REEDUCATION: CPT

## 2024-11-14 PROCEDURE — 97140 MANUAL THERAPY 1/> REGIONS: CPT

## 2024-11-14 NOTE — PROGRESS NOTES
[facility]   Physical Therapy Treatment Note      Name: Devora Isaacs  Clinic Number: [id number]    Therapy Diagnosis:   No diagnosis found.    Physician: Anastasia Sung MD    Visit Date: 11/14/2024    Physician Orders: PT Eval and Treat   Medical Diagnosis from Referral:   M25.511,G89.29 (ICD-10-CM) - Chronic right shoulder pain   M75.41 (ICD-10-CM) - Shoulder impingement syndrome, right   Evaluation Date: 9/30/2024  Authorization Period Expiration: 12/31/2024  Plan of Care Expiration: 11/30/2024  Progress Note Due: 10/29/2024  Visit # / Visits authorized: 10/20  FOTO: 1/3     Precautions: Standard,     PTA Visit #: 0/5     Time In: 6:02 pm   Time Out: 6:48 pm  Total Billable Time: 46 minutes    Subjective     Pt reports: Her hand has been feeling a lot better and she is having some shoulder pain especially after carrying heavy items. She is able to sleep better and is not kept up by her pain anymore.     She was compliant with home exercise program.  Response to previous treatment: decreased pain   Functional change: able to lift arm easier    Pain: 6/10  Location: right hands      Objective      Objective tests will be taken at progress report unless otherwise noted.     Treatment     Devora received the treatments listed below:      therapeutic exercises to develop strength, endurance, ROM, flexibility, posture, and core stabilization for 0 minutes including:    manual therapy techniques: Joint mobilizations, Manual traction, Myofacial release, Manual Lymphatic Drainage, Soft tissue Mobilization, and Friction Massage were applied to the: R shoulder for 8 minutes, including:  Upper cervical assessment   CT junction distraction prone and seated   Thoracic gapping, grades I-II  CT junction AP glides seated with passive CT extension   Thoracic manipulation (pt agreeable)    Not today:  First rib assessment   First Rib mobilizations   Shoulder joint assessment   First rib  "mobilization grades III-IV  Shoulder oscillations     neuromuscular re-education activities to improve: Balance, Coordination, Kinesthetic, Sense, Proprioception, and Posture for 38 minutes. The following activities were included:  Education on pain relief techniques   Ulnar nerve glides 20x  Shoulder shrugs with arm supported on pillow 10x   Wall slides with shoulder shrugs (arms tucked in closely) 2x10   Scap squeezes with arm supported on pillow 10x   Chin tucks 20x 5" holds   Open books 20x3" holds   Low trap isometric activation 20x 2" holds   Seated thoracic extension over foam roll 20x   Hand heel rocks w/ chin tuck 20x   IR/ER walk out 15x each OTB  Taping for scapular elevation and humeral head posterior glide to relieve neurovascular structures       therapeutic activities to improve functional performance for 0  minutes, including:        Patient Education and Home Exercises       Education provided:   - new HEP  - goals of taping  - pain relief techniques  - slow progression of sessions     Written Home Exercises Provided: YES. Exercises were reviewed and Devora was able to demonstrate them prior to the end of the session.  Devora demonstrated good understanding of the education provided. See EMR under Patient Instructions for exercises provided during therapy sessions    Assessment     Continues to have decreased hand pain and is starting to modify activities more leading to less pain. Pt progressed with IR/ER walk outs with proper humeral head positioning in the center of the joint to activate RC muscles with the humeral head not anteriorly sheared. Pt responding well to manual therapy with reports of decreased pain and increased mobility. Pt will continue to progress as tolerated.     Devora Is progressing well towards her goals.   Pt prognosis is Good.     Pt will continue to benefit from skilled outpatient physical therapy to address the deficits listed in the problem list box on initial " evaluation, provide pt/family education and to maximize pt's level of independence in the home and community environment.     Pt's spiritual, cultural and educational needs considered and pt agreeable to plan of care and goals.     Anticipated barriers to physical therapy: work    GOALS:   Short Term Goals:  4 weeks  1.Report decreased R shoulder and arm pain < / =  5/10  to increase tolerance for ADLs  2. Increase PROM by 10 degrees   3. Increased strength by 1/3 MMT grade in R shoulder and periscapular area to increase tolerance for ADL and work activities.  4. Pt to tolerate HEP to improve ROM and independence with ADL's     Long Term Goals: 8 weeks  1.Report decreased R shoulder and arm pain  < / =  2 /10  to increase tolerance for painting and work  2.Increase AROM to WNL  3.Increase strength to >/= 4/5 in R shoulder and periscapular area to increase tolerance for ADL and work activities.  4. 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 Pt will have improved gcode of CJ (20-40% limited) on FOTO shoulder in order to demonstrate true functional improvement.     Plan     Plan of care Certification: 9/30/2024 to 11/30/24.     Continue POC.     Robert Sculthojonathan PT, DPT

## 2024-11-18 ENCOUNTER — CLINICAL SUPPORT (OUTPATIENT)
Dept: REHABILITATION | Facility: HOSPITAL | Age: 29
End: 2024-11-18
Payer: COMMERCIAL

## 2024-11-18 DIAGNOSIS — R29.898 DECREASED GRIP STRENGTH OF RIGHT HAND: ICD-10-CM

## 2024-11-18 DIAGNOSIS — M25.611 DECREASED RANGE OF MOTION OF RIGHT SHOULDER: Primary | ICD-10-CM

## 2024-11-18 PROCEDURE — 97112 NEUROMUSCULAR REEDUCATION: CPT | Performed by: PHYSICAL THERAPIST

## 2024-11-18 PROCEDURE — 97140 MANUAL THERAPY 1/> REGIONS: CPT | Performed by: PHYSICAL THERAPIST

## 2024-11-18 NOTE — PROGRESS NOTES
[facility]   Physical Therapy Treatment Note      Name: Devora Agustin [facility]  Clinic Number: [id number]    Therapy Diagnosis:   Encounter Diagnoses   Name Primary?    Decreased range of motion of right shoulder Yes    Decreased  strength of right hand        Physician: Anastasia Sung MD    Visit Date: 11/18/2024    Physician Orders: PT Eval and Treat   Medical Diagnosis from Referral:   M25.511,G89.29 (ICD-10-CM) - Chronic right shoulder pain   M75.41 (ICD-10-CM) - Shoulder impingement syndrome, right   Evaluation Date: 9/30/2024  Authorization Period Expiration: 12/31/2024  Plan of Care Expiration: 11/30/2024  Progress Note Due: 10/29/2024  Visit # / Visits authorized: 11/20  FOTO: 1/3     Precautions: Standard,     PTA Visit #: 0/5     Time In: 812 am   Time Out: 902 am  Total Billable Time: 50 minutes    Subjective     Pt reports: Her hand has been feeling a lot better and she is having some shoulder pain especially after carrying heavy items. She is able to sleep better and is not kept up by her pain anymore.     She was compliant with home exercise program.  Response to previous treatment: decreased pain   Functional change: able to lift arm easier    Pain: 6/10  Location: right hands      Objective      Objective tests will be taken at progress report unless otherwise noted.     Treatment     Devora received the treatments listed below:      therapeutic exercises to develop strength, endurance, ROM, flexibility, posture, and core stabilization for 0 minutes including:    manual therapy techniques: Joint mobilizations, Manual traction, Myofacial release, Manual Lymphatic Drainage, Soft tissue Mobilization, and Friction Massage were applied to the: R shoulder for 10 minutes, including:  Upper cervical assessment   CT junction flick  Thoracic gapping, grades I-II prone    Not today:  CT junction AP glides seated with passive CT extension   Thoracic manipulation (pt  "agreeable)  First rib assessment   First Rib mobilizations   Shoulder joint assessment   First rib mobilization grades III-IV  Shoulder oscillations     neuromuscular re-education activities to improve: Balance, Coordination, Kinesthetic, Sense, Proprioception, and Posture for 40 minutes. The following activities were included:  Education on pain relief techniques   Ulnar nerve glides 20x  Prone scap set 20x 3"  IR walkouts YTB 2 x 10 3"  Wall slides with shoulder shrugs (arms tucked in closely) 2x10   DNF 2 x 10 3" hold  -added 1# shoulder flexion 2 x 10  Taping for scapular elevation and humeral head posterior glide to relieve neurovascular structures     NT  Shoulder shrugs with arm supported on pillow 10x   Scap squeezes with arm supported on pillow 10x   Chin tucks 20x 5" holds   Open books 20x3" holds   Low trap isometric activation 20x 2" holds   Seated thoracic extension over foam roll 20x   Hand heel rocks w/ chin tuck 20x   IR/ER walk out 15x each OTB      therapeutic activities to improve functional performance for 0  minutes, including:        Patient Education and Home Exercises       Education provided:   - new HEP  - goals of taping  - pain relief techniques  - slow progression of sessions     Written Home Exercises Provided: YES. Exercises were reviewed and Devora was able to demonstrate them prior to the end of the session.  Devora demonstrated good understanding of the education provided. See EMR under Patient Instructions for exercises provided during therapy sessions    Assessment     Noted increase in symptoms on arrival today due to working in the kitchen to do dishes over the weekend. Notes soreness since beginning cuff activation exercises, unable perform ER today but performed IR with cue for humeral head positioning to avoid pec over dominance. Performed ulnar nerve glide supine to reduce gravity dependent position on lower cervical spine. DNF increased flexion angle to work lower c-spine " and added in prox stability with distal mobility 1# weight.     Devora Is progressing well towards her goals.   Pt prognosis is Good.     Pt will continue to benefit from skilled outpatient physical therapy to address the deficits listed in the problem list box on initial evaluation, provide pt/family education and to maximize pt's level of independence in the home and community environment.     Pt's spiritual, cultural and educational needs considered and pt agreeable to plan of care and goals.     Anticipated barriers to physical therapy: work    GOALS:   Short Term Goals:  4 weeks  1.Report decreased R shoulder and arm pain < / =  5/10  to increase tolerance for ADLs  2. Increase PROM by 10 degrees   3. Increased strength by 1/3 MMT grade in R shoulder and periscapular area to increase tolerance for ADL and work activities.  4. Pt to tolerate HEP to improve ROM and independence with ADL's     Long Term Goals: 8 weeks  1.Report decreased R shoulder and arm pain  < / =  2 /10  to increase tolerance for painting and work  2.Increase AROM to WNL  3.Increase strength to >/= 4/5 in R shoulder and periscapular area to increase tolerance for ADL and work activities.  4. 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 Pt will have improved gcode of CJ (20-40% limited) on FOTO shoulder in order to demonstrate true functional improvement.     Plan     Plan of care Certification: 9/30/2024 to 11/30/24.     Continue POC.     Robert Sculthojonathan PT, DPT

## 2024-11-19 ENCOUNTER — TELEPHONE (OUTPATIENT)
Dept: NEUROLOGY | Facility: CLINIC | Age: 29
End: 2024-11-19
Payer: COMMERCIAL

## 2024-11-21 ENCOUNTER — CLINICAL SUPPORT (OUTPATIENT)
Dept: REHABILITATION | Facility: HOSPITAL | Age: 29
End: 2024-11-21
Payer: COMMERCIAL

## 2024-11-21 DIAGNOSIS — R29.898 DECREASED GRIP STRENGTH OF RIGHT HAND: ICD-10-CM

## 2024-11-21 DIAGNOSIS — M25.611 DECREASED RANGE OF MOTION OF RIGHT SHOULDER: Primary | ICD-10-CM

## 2024-11-21 PROCEDURE — 97112 NEUROMUSCULAR REEDUCATION: CPT

## 2024-11-21 PROCEDURE — 97140 MANUAL THERAPY 1/> REGIONS: CPT

## 2024-11-21 NOTE — PROGRESS NOTES
[facility]   Physical Therapy Treatment Note      Name: Devora Agustin [facility]  Clinic Number: [id number]    Therapy Diagnosis:   Encounter Diagnoses   Name Primary?    Decreased range of motion of right shoulder Yes    Decreased  strength of right hand          Physician: Anastasia Sung MD    Visit Date: 11/21/2024    Physician Orders: PT Eval and Treat   Medical Diagnosis from Referral:   M25.511,G89.29 (ICD-10-CM) - Chronic right shoulder pain   M75.41 (ICD-10-CM) - Shoulder impingement syndrome, right   Evaluation Date: 9/30/2024  Authorization Period Expiration: 12/31/2024  Plan of Care Expiration: 11/30/2024  Progress Note Due: 10/29/2024  Visit # / Visits authorized: 12/20  FOTO: 1/3     Precautions: Standard,     PTA Visit #: 0/5     Time In: 6:00 pm   Time Out: 6:42 pm  Total Billable Time: 42 minutes    Subjective     Pt reports: She had a lot of pain the day after last session and is just starting to feel better today, but she still has pain in her joint.     She was compliant with home exercise program.  Response to previous treatment: decreased pain   Functional change: able to lift arm easier    Pain: 6/10  Location: right hands      Objective      Objective tests will be taken at progress report unless otherwise noted.     Treatment     Devora received the treatments listed below:      therapeutic exercises to develop strength, endurance, ROM, flexibility, posture, and core stabilization for 0 minutes including:    manual therapy techniques: Joint mobilizations, Manual traction, Myofacial release, Manual Lymphatic Drainage, Soft tissue Mobilization, and Friction Massage were applied to the: R shoulder for 8 minutes, including:  Prone thoracic manipulation (pt agreeable)  Thoracic gapping, grades I-III prone  CT junction AP glides seated with passive CT extension     Not today:  First rib assessment   First Rib mobilizations   Shoulder joint assessment   First rib  "mobilization grades III-IV  Shoulder oscillations     neuromuscular re-education activities to improve: Balance, Coordination, Kinesthetic, Sense, Proprioception, and Posture for 34 minutes. The following activities were included:  Education on pain relief techniques   Ulnar nerve glides 20x  Scap squeezes with arm supported on pillow 10x   IR walkouts YTB 2 x 10 3"  Wall slides with shoulder shrugs (arms tucked in closely) 2x10   DNF 2 x 10 5" hold  Taping for scapular elevation and humeral head posterior glide to relieve neurovascular structures   Open books 20x3" holds   Hand heel rocks w/ chin tuck 20x     NT  Prone scap set 20x 3"  Shoulder shrugs with arm supported on pillow 10x   Chin tucks 20x 5" holds   Low trap isometric activation 20x 2" holds   Seated thoracic extension over foam roll 20x   IR/ER walk out 15x each OTB      therapeutic activities to improve functional performance for 0  minutes, including:        Patient Education and Home Exercises       Education provided:   - new HEP  - goals of taping  - pain relief techniques  - slow progression of sessions     Written Home Exercises Provided: YES. Exercises were reviewed and Devora was able to demonstrate them prior to the end of the session.  Devora demonstrated good understanding of the education provided. See EMR under Patient Instructions for exercises provided during therapy sessions    Assessment     Pt regressed today due to pain upon arrival and pain after last session. Will continue to slowly progress patient back to more functional movements as tolerated. Continue to build RC and scapular strength to help prevent GH head anterior glide.     Devora Is progressing well towards her goals.   Pt prognosis is Good.     Pt will continue to benefit from skilled outpatient physical therapy to address the deficits listed in the problem list box on initial evaluation, provide pt/family education and to maximize pt's level of independence in the " home and community environment.     Pt's spiritual, cultural and educational needs considered and pt agreeable to plan of care and goals.     Anticipated barriers to physical therapy: work    GOALS:   Short Term Goals:  4 weeks  1.Report decreased R shoulder and arm pain < / =  5/10  to increase tolerance for ADLs  2. Increase PROM by 10 degrees   3. Increased strength by 1/3 MMT grade in R shoulder and periscapular area to increase tolerance for ADL and work activities.  4. Pt to tolerate HEP to improve ROM and independence with ADL's     Long Term Goals: 8 weeks  1.Report decreased R shoulder and arm pain  < / =  2 /10  to increase tolerance for painting and work  2.Increase AROM to WNL  3.Increase strength to >/= 4/5 in R shoulder and periscapular area to increase tolerance for ADL and work activities.  4. 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 Pt will have improved gcode of CJ (20-40% limited) on FOTO shoulder in order to demonstrate true functional improvement.     Plan     Plan of care Certification: 9/30/2024 to 11/30/24.     Continue POC.     Robert Sculthojonathan PT, DPT

## 2024-12-05 ENCOUNTER — CLINICAL SUPPORT (OUTPATIENT)
Dept: REHABILITATION | Facility: HOSPITAL | Age: 29
End: 2024-12-05
Payer: COMMERCIAL

## 2024-12-05 ENCOUNTER — PATIENT MESSAGE (OUTPATIENT)
Dept: OBSTETRICS AND GYNECOLOGY | Facility: CLINIC | Age: 29
End: 2024-12-05
Payer: COMMERCIAL

## 2024-12-05 DIAGNOSIS — M25.611 DECREASED RANGE OF MOTION OF RIGHT SHOULDER: Primary | ICD-10-CM

## 2024-12-05 DIAGNOSIS — R29.898 DECREASED GRIP STRENGTH OF RIGHT HAND: ICD-10-CM

## 2024-12-05 DIAGNOSIS — R10.32 LEFT LOWER QUADRANT ABDOMINAL PAIN: ICD-10-CM

## 2024-12-05 DIAGNOSIS — N94.6 DYSMENORRHEA: ICD-10-CM

## 2024-12-05 DIAGNOSIS — R10.2 PELVIC PAIN IN FEMALE: ICD-10-CM

## 2024-12-05 PROCEDURE — 97140 MANUAL THERAPY 1/> REGIONS: CPT

## 2024-12-05 PROCEDURE — 97112 NEUROMUSCULAR REEDUCATION: CPT

## 2024-12-05 PROCEDURE — 97530 THERAPEUTIC ACTIVITIES: CPT

## 2024-12-05 PROCEDURE — 97164 PT RE-EVAL EST PLAN CARE: CPT

## 2024-12-05 RX ORDER — HYDROCODONE BITARTRATE AND ACETAMINOPHEN 5; 325 MG/1; MG/1
1 TABLET ORAL EVERY 6 HOURS PRN
Qty: 10 TABLET | Refills: 0 | Status: SHIPPED | OUTPATIENT
Start: 2024-12-05

## 2024-12-05 NOTE — PROGRESS NOTES
[facility] [place] [facility]   Physical Therapy Re-Evaluation Note      Name: Devora Meeks  Clinic Number: [id number]    Therapy Diagnosis:   Encounter Diagnoses   Name Primary?    Decreased range of motion of right shoulder Yes    Decreased  strength of right hand            Physician: Anastasia Sung MD    Visit Date: 12/5/2024    Physician Orders: PT Eval and Treat   Medical Diagnosis from Referral:   M25.511,G89.29 (ICD-10-CM) - Chronic right shoulder pain   M75.41 (ICD-10-CM) - Shoulder impingement syndrome, right   Evaluation Date: 9/30/2024  Authorization Period Expiration: 12/31/2024  Plan of Care Expiration: 02/13/2025  Progress Note Due: 1/5/2025  Visit # / Visits authorized: 13/20  FOTO: 2/3     Precautions: Standard,     PTA Visit #: 0/5     Time In: 6:02 pm   Time Out: *** pm  Total Billable Time: *** minutes    Subjective     Pt reports: Her arm has been feeling pretty bad recently, and she is still having face symptoms    She was compliant with home exercise program.  Response to previous treatment: decreased pain   Functional change: able to lift arm easier    Pain: 6/10  Location: right hands      Objective      Posture: Pt sits with rounded shoulders and forward head posture; anteriorly tipped scapulas, bilaterally depressed shoulders. R shoulder sits more anterior than her L shoulder.     Passive Range of Motion:*denotes pain   Shoulder Right Left   Flexion 170* WNL   Abduction 160* WNL   ER at 0 WNL WNL   ER at 90 WNL WNL   IR WNL WNL      Active Range of Motion:*denotes pain   Shoulder Right Left   Flexion 170* WNL   Abduction 160* WNL   ER at 0 WNL WNL   ER at 90 WNL WNL   IR WNL WNL   Reach behind head T2 T4   Reach behind back  T4 T2      Strength:  Shoulder Right Left   Flexion 4-/5 4/5   Abduction 4-/5 4+/5   ER 4-/5 5/5   IR 4/5 5/5   Serratus Anterior 3/5 4/5   Middle Trap 3/5 4/5   Low Trap 3/5 3/5         Special Tests:  Impingement Cluster:    Right Left  "  Elana Lee NT due to irritability NT due to irritability    Painful Arc + -   Resisted ER + -      Rotator Cuff Tear    Right Left   Painful Arc + -   Drop Arm test - -   Resisted ER + -      Joint Mobility: decreased posterior glides, anterior laxity, normal cervical motion      Palpation: TTP to anterior shoulder joint      Sensation: intact     :  L: 22 lbs  R: 18 lbs    Treatment     Devora received the treatments listed below:      therapeutic exercises to develop strength, endurance, ROM, flexibility, posture, and core stabilization for 0 minutes including:    manual therapy techniques: Joint mobilizations, Manual traction, Myofacial release, Manual Lymphatic Drainage, Soft tissue Mobilization, and Friction Massage were applied to the: R shoulder for 8 minutes, including:  Prone thoracic manipulation (pt agreeable)  Thoracic gapping, grades I-III prone  CT junction AP glides seated with passive CT extension     Not today:  First rib assessment   First Rib mobilizations   Shoulder joint assessment   First rib mobilization grades III-IV  Shoulder oscillations     neuromuscular re-education activities to improve: Balance, Coordination, Kinesthetic, Sense, Proprioception, and Posture for 34 minutes. The following activities were included:  Education on pain relief techniques   Ulnar nerve glides 20x  Scap squeezes with arm supported on pillow 10x   IR walkouts YTB 2 x 10 3"  Wall slides with shoulder shrugs (arms tucked in closely) 2x10   DNF 2 x 10 10" hold  Taping for scapular elevation and humeral head posterior glide to relieve neurovascular structures   Open books 20x3" holds   Hand heel rocks w/ chin tuck 20x   Seated thoracic extension over foam roll 20x     NT  Prone scap set 20x 3"  Shoulder shrugs with arm supported on pillow 10x   Chin tucks 20x 5" holds   Low trap isometric activation 20x 2" holds   IR/ER walk out 15x each OTB      therapeutic activities to improve functional performance " for 8  minutes, including:  ROM and strength reassessment     Patient Education and Home Exercises       Education provided:   - new HEP  - goals of taping  - pain relief techniques  - slow progression of sessions     Written Home Exercises Provided: YES. Exercises were reviewed and Devora was able to demonstrate them prior to the end of the session.  Devora demonstrated good understanding of the education provided. See EMR under Patient Instructions for exercises provided during therapy sessions    Assessment     Pt has made progress since beginning therapy and was having less pain for a while. She recently started to have more pain after missing therapy for about a week. She was reassessed today and has made some progress with ROM and strength, but she is still having pain almost daily. Pt needs more therapy because before she missed for a week, she was having much less pain. Pt needs to continue to build periscapular strength and activating the rottator cuff to precent anterior humeral head glide. Pt will continue to progress as tolerated.     Devora Is progressing well towards her goals.   Pt prognosis is Good.     Pt will continue to benefit from skilled outpatient physical therapy to address the deficits listed in the problem list box on initial evaluation, provide pt/family education and to maximize pt's level of independence in the home and community environment.     Pt's spiritual, cultural and educational needs considered and pt agreeable to plan of care and goals.     Anticipated barriers to physical therapy: work    GOALS:   Short Term Goals:  4 weeks  1.Report decreased R shoulder and arm pain < / =  5/10  to increase tolerance for ADLs  2. Increase PROM by 10 degrees   3. Increased strength by 1/3 MMT grade in R shoulder and periscapular area to increase tolerance for ADL and work activities.  4. Pt to tolerate HEP to improve ROM and independence with ADL's     Long Term Goals: 8 weeks  1.Report  decreased R shoulder and arm pain  < / =  2 /10  to increase tolerance for painting and work  2.Increase AROM to WNL  3.Increase strength to >/= 4/5 in R shoulder and periscapular area to increase tolerance for ADL and work activities.  4. 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 Pt will have improved gcode of CJ (20-40% limited) on FOTO shoulder in order to demonstrate true functional improvement.     Plan     Plan of care Certification: 12/5/2024 to 02/13/25.     Outpatient Physical Therapy 2 times weekly for 10 more weeks to include the following interventions: manual therapy, therapeutic exercise, therapeutic activities, and neuromuscular re-education.     Robert Arrington PT, DPT                             done

## 2024-12-05 NOTE — TELEPHONE ENCOUNTER
Refill Routing Note   Medication(s) are not appropriate for processing by [facility] for the following reason(s):        Outside of protocol    [facility] action(s):  Route               Appointments  past 12m or future 3m with PCP    Date Provider   Last Visit   9/9/2024 Lyla Barrientos, DO   Next Visit   Visit date not found Lyla Barrientos, DO   ED visits in past 90 days: 1        Note composed:6:44 AM 12/05/2024

## 2024-12-06 NOTE — PLAN OF CARE
[facility]   Physical Therapy Re-Evaluation Note      Name: Devora Meeks  Clinic Number: [id number]    Therapy Diagnosis:   Encounter Diagnoses   Name Primary?    Decreased range of motion of right shoulder Yes    Decreased  strength of right hand      Physician: Anastasia Sung MD    Visit Date: 12/5/2024    Physician Orders: PT Eval and Treat   Medical Diagnosis from Referral:   M25.511,G89.29 (ICD-10-CM) - Chronic right shoulder pain   M75.41 (ICD-10-CM) - Shoulder impingement syndrome, right   Evaluation Date: 9/30/2024  Authorization Period Expiration: 12/31/2024  Plan of Care Expiration: 02/13/2025  Progress Note Due: 1/5/2025  Visit # / Visits authorized: 13/20  FOTO: 2/3     Precautions: Standard,     PTA Visit #: 0/5     Time In: 6:02 pm   Time Out: 6:42 pm  Total Billable Time: 40 minutes    Subjective     Pt reports: Her arm has been feeling pretty bad recently, and she is still having face symptoms    She was compliant with home exercise program.  Response to previous treatment: decreased pain   Functional change: able to lift arm easier    Pain: 6/10  Location: right hands      Objective      Posture: Pt sits with rounded shoulders and forward head posture; anteriorly tipped scapulas, bilaterally depressed shoulders. R shoulder sits more anterior than her L shoulder.     Passive Range of Motion:*denotes pain   Shoulder Right Left   Flexion 170* WNL   Abduction 160* WNL   ER at 0 WNL WNL   ER at 90 WNL WNL   IR WNL WNL      Active Range of Motion:*denotes pain   Shoulder Right Left   Flexion 170* WNL   Abduction 160* WNL   ER at 0 WNL WNL   ER at 90 WNL WNL   IR WNL WNL   Reach behind head T2 T4   Reach behind back  T4 T2      Strength:  Shoulder Right Left   Flexion 4-/5 4/5   Abduction 4-/5 4+/5   ER 4-/5 5/5   IR 4/5 5/5   Serratus Anterior 3/5 4/5   Middle Trap 3/5 4/5   Low Trap 3/5 3/5         Special Tests:  Impingement Cluster:    Right Left  "  Elana Lee NT due to irritability NT due to irritability    Painful Arc + -   Resisted ER + -      Rotator Cuff Tear    Right Left   Painful Arc + -   Drop Arm test - -   Resisted ER + -      Joint Mobility: decreased posterior glides, anterior laxity, normal cervical motion      Palpation: TTP to anterior shoulder joint      Sensation: intact     :  L: 22 lbs  R: 18 lbs    Treatment     Devora received the treatments listed below:      therapeutic exercises to develop strength, endurance, ROM, flexibility, posture, and core stabilization for 0 minutes including:    manual therapy techniques: Joint mobilizations, Manual traction, Myofacial release, Manual Lymphatic Drainage, Soft tissue Mobilization, and Friction Massage were applied to the: R shoulder for 8 minutes, including:  Prone thoracic manipulation (pt agreeable)  Thoracic gapping, grades I-III prone  CT junction AP glides seated with passive CT extension     Not today:  First rib assessment   First Rib mobilizations   Shoulder joint assessment   First rib mobilization grades III-IV  Shoulder oscillations     neuromuscular re-education activities to improve: Balance, Coordination, Kinesthetic, Sense, Proprioception, and Posture for 24 minutes. The following activities were included:  Education on pain relief techniques   Ulnar nerve glides 20x  Scap squeezes with arm supported on pillow 10x   IR walkouts YTB 2 x 10 3"  Wall slides with shoulder shrugs (arms tucked in closely) 2x10   DNF 2 x 10 10" hold  Taping for scapular elevation and humeral head posterior glide to relieve neurovascular structures   Open books 20x3" holds   Hand heel rocks w/ chin tuck 20x   Seated thoracic extension over foam roll 20x     NT  Prone scap set 20x 3"  Shoulder shrugs with arm supported on pillow 10x   Chin tucks 20x 5" holds   Low trap isometric activation 20x 2" holds   IR/ER walk out 15x each OTB      therapeutic activities to improve functional performance " for 8  minutes, including:  ROM and strength reassessment     Patient Education and Home Exercises       Education provided:   - new HEP  - goals of taping  - pain relief techniques  - slow progression of sessions     Written Home Exercises Provided: YES. Exercises were reviewed and Devora was able to demonstrate them prior to the end of the session.  Devora demonstrated good understanding of the education provided. See EMR under Patient Instructions for exercises provided during therapy sessions    Assessment     Pt has made progress since beginning therapy and was having less pain for a while. She recently started to have more pain after missing therapy for about a week. She was reassessed today and has made some progress with ROM and strength, but she is still having pain almost daily. Pt needs more therapy to continue to build on improvements made so far in therapy. Before she missed for a week, she was having much less pain and better function in her R hand. Pt needs to continue to build periscapular strength and activating the rottator cuff to prevent anterior humeral head glide. Pt will continue to progress as tolerated.     Devora Is progressing well towards her goals.   Pt prognosis is Good.     Pt will continue to benefit from skilled outpatient physical therapy to address the deficits listed in the problem list box on initial evaluation, provide pt/family education and to maximize pt's level of independence in the home and community environment.     Pt's spiritual, cultural and educational needs considered and pt agreeable to plan of care and goals.     Anticipated barriers to physical therapy: work    GOALS:   Short Term Goals:  4 weeks  1.Report decreased R shoulder and arm pain < / =  5/10  to increase tolerance for ADLs  2. Increase PROM by 10 degrees   3. Increased strength by 1/3 MMT grade in R shoulder and periscapular area to increase tolerance for ADL and work activities.  4. Pt to tolerate  HEP to improve ROM and independence with ADL's     Long Term Goals: 8 weeks  1.Report decreased R shoulder and arm pain  < / =  2 /10  to increase tolerance for painting and work  2.Increase AROM to WNL  3.Increase strength to >/= 4/5 in R shoulder and periscapular area to increase tolerance for ADL and work activities.  4. 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 Pt will have improved gcode of CJ (20-40% limited) on FOTO shoulder in order to demonstrate true functional improvement.     Plan     Plan of care Certification: 12/5/2024 to 02/13/25.     Outpatient Physical Therapy 2 times weekly for 10 more weeks to include the following interventions: manual therapy, therapeutic exercise, therapeutic activities, and neuromuscular re-education.     Robert Mayhojonathan PT, DPT

## 2024-12-07 ENCOUNTER — OCHSNER VIRTUAL EMERGENCY DEPARTMENT (OUTPATIENT)
Facility: CLINIC | Age: 29
End: 2024-12-07
Payer: COMMERCIAL

## 2024-12-07 ENCOUNTER — OFFICE VISIT (OUTPATIENT)
Dept: URGENT CARE | Facility: CLINIC | Age: 29
End: 2024-12-07
Payer: COMMERCIAL

## 2024-12-07 ENCOUNTER — HOSPITAL ENCOUNTER (EMERGENCY)
Facility: HOSPITAL | Age: 29
Discharge: HOME | End: 2024-12-07
Attending: STUDENT IN AN ORGANIZED HEALTH CARE EDUCATION/TRAINING PROGRAM
Payer: COMMERCIAL

## 2024-12-07 VITALS
BODY MASS INDEX: 21.97 KG/M2 | WEIGHT: 124 LBS | HEART RATE: 67 BPM | RESPIRATION RATE: 16 BRPM | TEMPERATURE: 98 F | OXYGEN SATURATION: 98 % | SYSTOLIC BLOOD PRESSURE: 119 MMHG | DIASTOLIC BLOOD PRESSURE: 65 MMHG

## 2024-12-07 VITALS
DIASTOLIC BLOOD PRESSURE: 89 MMHG | TEMPERATURE: 98 F | OXYGEN SATURATION: 98 % | RESPIRATION RATE: 20 BRPM | SYSTOLIC BLOOD PRESSURE: 150 MMHG | WEIGHT: 124 LBS | HEART RATE: 100 BPM | BODY MASS INDEX: 21.97 KG/M2 | HEIGHT: 63 IN

## 2024-12-07 DIAGNOSIS — D35.2 PITUITARY ADENOMA: ICD-10-CM

## 2024-12-07 DIAGNOSIS — R29.818 ACUTE FOCAL NEUROLOGICAL DEFICIT: ICD-10-CM

## 2024-12-07 DIAGNOSIS — R51.9 NONINTRACTABLE HEADACHE, UNSPECIFIED CHRONICITY PATTERN, UNSPECIFIED HEADACHE TYPE: Primary | ICD-10-CM

## 2024-12-07 DIAGNOSIS — R20.0 NUMBNESS: ICD-10-CM

## 2024-12-07 DIAGNOSIS — R20.0 NUMBNESS AND TINGLING OF RIGHT SIDE OF FACE: Primary | ICD-10-CM

## 2024-12-07 DIAGNOSIS — R20.0 RIGHT ARM NUMBNESS: ICD-10-CM

## 2024-12-07 DIAGNOSIS — R20.2 NUMBNESS AND TINGLING OF RIGHT SIDE OF FACE: Primary | ICD-10-CM

## 2024-12-07 LAB
ALBUMIN SERPL BCP-MCNC: 4.4 G/DL (ref 3.5–5.2)
ALP SERPL-CCNC: 28 U/L (ref 40–150)
ALT SERPL W/O P-5'-P-CCNC: 15 U/L (ref 10–44)
ANION GAP SERPL CALC-SCNC: 14 MMOL/L (ref 8–16)
AST SERPL-CCNC: 25 U/L (ref 10–40)
BASOPHILS # BLD AUTO: 0.08 K/UL (ref 0–0.2)
BASOPHILS NFR BLD: 0.9 % (ref 0–1.9)
BILIRUB SERPL-MCNC: 0.2 MG/DL (ref 0.1–1)
BUN SERPL-MCNC: 8 MG/DL (ref 6–20)
CALCIUM SERPL-MCNC: 9.8 MG/DL (ref 8.7–10.5)
CHLORIDE SERPL-SCNC: 107 MMOL/L (ref 95–110)
CHOLEST SERPL-MCNC: 199 MG/DL (ref 120–199)
CHOLEST/HDLC SERPL: 2.9 {RATIO} (ref 2–5)
CO2 SERPL-SCNC: 19 MMOL/L (ref 23–29)
CREAT SERPL-MCNC: 0.8 MG/DL (ref 0.5–1.4)
CREAT SERPL-MCNC: 0.8 MG/DL (ref 0.5–1.4)
DIFFERENTIAL METHOD BLD: ABNORMAL
EOSINOPHIL # BLD AUTO: 0.1 K/UL (ref 0–0.5)
EOSINOPHIL NFR BLD: 0.7 % (ref 0–8)
ERYTHROCYTE [DISTWIDTH] IN BLOOD BY AUTOMATED COUNT: 13.4 % (ref 11.5–14.5)
EST. GFR  (NO RACE VARIABLE): >60 ML/MIN/1.73 M^2
GLUCOSE SERPL-MCNC: 80 MG/DL (ref 70–110)
HCG INTACT+B SERPL-ACNC: <2.4 MIU/ML
HCT VFR BLD AUTO: 41.9 % (ref 37–48.5)
HDLC SERPL-MCNC: 68 MG/DL (ref 40–75)
HDLC SERPL: 34.2 % (ref 20–50)
HGB BLD-MCNC: 13 G/DL (ref 12–16)
IMM GRANULOCYTES # BLD AUTO: 0.01 K/UL (ref 0–0.04)
IMM GRANULOCYTES NFR BLD AUTO: 0.1 % (ref 0–0.5)
INR PPP: 1 (ref 0.8–1.2)
LDLC SERPL CALC-MCNC: 99.8 MG/DL (ref 63–159)
LYMPHOCYTES # BLD AUTO: 2.8 K/UL (ref 1–4.8)
LYMPHOCYTES NFR BLD: 29.4 % (ref 18–48)
MCH RBC QN AUTO: 30.9 PG (ref 27–31)
MCHC RBC AUTO-ENTMCNC: 31 G/DL (ref 32–36)
MCV RBC AUTO: 100 FL (ref 82–98)
MONOCYTES # BLD AUTO: 0.4 K/UL (ref 0.3–1)
MONOCYTES NFR BLD: 4.2 % (ref 4–15)
NEUTROPHILS # BLD AUTO: 6.1 K/UL (ref 1.8–7.7)
NEUTROPHILS NFR BLD: 64.7 % (ref 38–73)
NONHDLC SERPL-MCNC: 131 MG/DL
NRBC BLD-RTO: 0 /100 WBC
PLATELET # BLD AUTO: 267 K/UL (ref 150–450)
PMV BLD AUTO: 11.4 FL (ref 9.2–12.9)
POC PTINR: 1.3 (ref 0.9–1.2)
POC PTWBT: 12.8 SEC (ref 9.7–14.3)
POCT GLUCOSE: 87 MG/DL (ref 70–110)
POTASSIUM SERPL-SCNC: 3.6 MMOL/L (ref 3.5–5.1)
PROT SERPL-MCNC: 8 G/DL (ref 6–8.4)
PROTHROMBIN TIME: 10.6 SEC (ref 9–12.5)
RBC # BLD AUTO: 4.21 M/UL (ref 4–5.4)
SAMPLE: ABNORMAL
SAMPLE: NORMAL
SODIUM SERPL-SCNC: 140 MMOL/L (ref 136–145)
TRIGL SERPL-MCNC: 156 MG/DL (ref 30–150)
TSH SERPL DL<=0.005 MIU/L-ACNC: 1.83 UIU/ML (ref 0.4–4)
WBC # BLD AUTO: 9.38 K/UL (ref 3.9–12.7)

## 2024-12-07 PROCEDURE — 25000003 PHARM REV CODE 250: Performed by: STUDENT IN AN ORGANIZED HEALTH CARE EDUCATION/TRAINING PROGRAM

## 2024-12-07 PROCEDURE — 80061 LIPID PANEL: CPT | Performed by: STUDENT IN AN ORGANIZED HEALTH CARE EDUCATION/TRAINING PROGRAM

## 2024-12-07 PROCEDURE — 85025 COMPLETE CBC W/AUTO DIFF WBC: CPT | Performed by: STUDENT IN AN ORGANIZED HEALTH CARE EDUCATION/TRAINING PROGRAM

## 2024-12-07 PROCEDURE — 25500020 PHARM REV CODE 255: Performed by: STUDENT IN AN ORGANIZED HEALTH CARE EDUCATION/TRAINING PROGRAM

## 2024-12-07 PROCEDURE — 82565 ASSAY OF CREATININE: CPT

## 2024-12-07 PROCEDURE — 84443 ASSAY THYROID STIM HORMONE: CPT | Performed by: STUDENT IN AN ORGANIZED HEALTH CARE EDUCATION/TRAINING PROGRAM

## 2024-12-07 PROCEDURE — 99900035 HC TECH TIME PER 15 MIN (STAT)

## 2024-12-07 PROCEDURE — 84702 CHORIONIC GONADOTROPIN TEST: CPT | Performed by: STUDENT IN AN ORGANIZED HEALTH CARE EDUCATION/TRAINING PROGRAM

## 2024-12-07 PROCEDURE — 99285 EMERGENCY DEPT VISIT HI MDM: CPT | Mod: 25

## 2024-12-07 PROCEDURE — 99284 EMERGENCY DEPT VISIT MOD MDM: CPT | Mod: FS,,, | Performed by: STUDENT IN AN ORGANIZED HEALTH CARE EDUCATION/TRAINING PROGRAM

## 2024-12-07 PROCEDURE — 85610 PROTHROMBIN TIME: CPT | Performed by: STUDENT IN AN ORGANIZED HEALTH CARE EDUCATION/TRAINING PROGRAM

## 2024-12-07 PROCEDURE — 96374 THER/PROPH/DIAG INJ IV PUSH: CPT

## 2024-12-07 PROCEDURE — 85610 PROTHROMBIN TIME: CPT

## 2024-12-07 PROCEDURE — 94761 N-INVAS EAR/PLS OXIMETRY MLT: CPT

## 2024-12-07 PROCEDURE — 80053 COMPREHEN METABOLIC PANEL: CPT | Performed by: STUDENT IN AN ORGANIZED HEALTH CARE EDUCATION/TRAINING PROGRAM

## 2024-12-07 PROCEDURE — 93005 ELECTROCARDIOGRAM TRACING: CPT

## 2024-12-07 PROCEDURE — A9585 GADOBUTROL INJECTION: HCPCS | Performed by: STUDENT IN AN ORGANIZED HEALTH CARE EDUCATION/TRAINING PROGRAM

## 2024-12-07 PROCEDURE — 99214 OFFICE O/P EST MOD 30 MIN: CPT | Mod: S$GLB,,,

## 2024-12-07 PROCEDURE — 93010 ELECTROCARDIOGRAM REPORT: CPT | Mod: ,,, | Performed by: INTERNAL MEDICINE

## 2024-12-07 PROCEDURE — 96375 TX/PRO/DX INJ NEW DRUG ADDON: CPT

## 2024-12-07 PROCEDURE — 63600175 PHARM REV CODE 636 W HCPCS: Performed by: STUDENT IN AN ORGANIZED HEALTH CARE EDUCATION/TRAINING PROGRAM

## 2024-12-07 RX ORDER — PROCHLORPERAZINE EDISYLATE 5 MG/ML
2.5 INJECTION INTRAMUSCULAR; INTRAVENOUS
Status: COMPLETED | OUTPATIENT
Start: 2024-12-07 | End: 2024-12-07

## 2024-12-07 RX ORDER — GADOBUTROL 604.72 MG/ML
6 INJECTION INTRAVENOUS
Status: COMPLETED | OUTPATIENT
Start: 2024-12-07 | End: 2024-12-07

## 2024-12-07 RX ORDER — TETRACAINE HYDROCHLORIDE 5 MG/ML
1 SOLUTION OPHTHALMIC ONCE
Status: DISCONTINUED | OUTPATIENT
Start: 2024-12-07 | End: 2024-12-07

## 2024-12-07 RX ORDER — TETRACAINE HYDROCHLORIDE 5 MG/ML
2 SOLUTION OPHTHALMIC ONCE
Status: DISCONTINUED | OUTPATIENT
Start: 2024-12-07 | End: 2024-12-07

## 2024-12-07 RX ORDER — PROPARACAINE HYDROCHLORIDE 5 MG/ML
1 SOLUTION/ DROPS OPHTHALMIC
Status: COMPLETED | OUTPATIENT
Start: 2024-12-07 | End: 2024-12-07

## 2024-12-07 RX ORDER — ACETAMINOPHEN 500 MG
1000 TABLET ORAL
Status: COMPLETED | OUTPATIENT
Start: 2024-12-07 | End: 2024-12-07

## 2024-12-07 RX ORDER — DIPHENHYDRAMINE HCL 25 MG
25 CAPSULE ORAL
Status: DISCONTINUED | OUTPATIENT
Start: 2024-12-07 | End: 2024-12-07

## 2024-12-07 RX ORDER — KETOROLAC TROMETHAMINE 30 MG/ML
10 INJECTION, SOLUTION INTRAMUSCULAR; INTRAVENOUS
Status: COMPLETED | OUTPATIENT
Start: 2024-12-07 | End: 2024-12-07

## 2024-12-07 RX ORDER — DEXAMETHASONE SODIUM PHOSPHATE 4 MG/ML
4 INJECTION, SOLUTION INTRA-ARTICULAR; INTRALESIONAL; INTRAMUSCULAR; INTRAVENOUS; SOFT TISSUE ONCE
Status: COMPLETED | OUTPATIENT
Start: 2024-12-07 | End: 2024-12-07

## 2024-12-07 RX ORDER — DIAZEPAM 2 MG/1
2 TABLET ORAL
Status: COMPLETED | OUTPATIENT
Start: 2024-12-07 | End: 2024-12-07

## 2024-12-07 RX ADMIN — SODIUM CHLORIDE 1000 ML: 9 INJECTION, SOLUTION INTRAVENOUS at 10:12

## 2024-12-07 RX ADMIN — PROCHLORPERAZINE EDISYLATE 2.5 MG: 5 INJECTION INTRAMUSCULAR; INTRAVENOUS at 10:12

## 2024-12-07 RX ADMIN — IOHEXOL 100 ML: 350 INJECTION, SOLUTION INTRAVENOUS at 05:12

## 2024-12-07 RX ADMIN — GADOBUTROL 6 ML: 604.72 INJECTION INTRAVENOUS at 07:12

## 2024-12-07 RX ADMIN — DIAZEPAM 2 MG: 2 TABLET ORAL at 07:12

## 2024-12-07 RX ADMIN — KETOROLAC TROMETHAMINE 10 MG: 30 INJECTION, SOLUTION INTRAMUSCULAR; INTRAVENOUS at 05:12

## 2024-12-07 RX ADMIN — PROPARACAINE HYDROCHLORIDE 1 DROP: 5 SOLUTION/ DROPS OPHTHALMIC at 06:12

## 2024-12-07 RX ADMIN — ACETAMINOPHEN 1000 MG: 500 TABLET ORAL at 05:12

## 2024-12-07 RX ADMIN — DEXAMETHASONE SODIUM PHOSPHATE 4 MG: 4 INJECTION INTRA-ARTICULAR; INTRALESIONAL; INTRAMUSCULAR; INTRAVENOUS; SOFT TISSUE at 10:12

## 2024-12-07 NOTE — PATIENT INSTRUCTIONS
Present as soon as possible to the Emergency Department at [facility] [id number] for further workup. Ambulance ride offered, patient declined. Neighbor coming to pick her up and bringing to the ED.

## 2024-12-07 NOTE — ED PROVIDER NOTES
Encounter Date: 12/7/2024       History     Chief Complaint   Patient presents with    Numbness     Right side facial numbness, started around noon yesterday. Eye blurriness since this morning around 730. Also reports difficulty speaking     This is a 29-year-old female with a history of right arm radiculopathy who presents to the ED for speech difficulty and tingling to the right side of the face.  She thinks the symptoms began sometime around yesterday afternoon, initially with just some heaviness in numb feeling to the right side of her face, but several coworkers noticed today that her speech was abnormal.  She states that she has been having difficulty finding words.  I met the patient in triage, and a friend who is with her feels that her speech is not normal.  She reports continued numbness to the right side of her face only.  She does endorse some intermittent numbness to the right 4th and 5th fingers, which is chronic in she states that she is in physical therapy for this.  She denies any other weakness, neck pain, recent fevers or chills, chest pain, shortness of breath.        Review of patient's allergies indicates:  No Known Allergies  Past Medical History:   Diagnosis Date    Abnormal Pap smear of cervix      Past Surgical History:   Procedure Laterality Date    CERVICAL BIOPSY  W/ LOOP ELECTRODE EXCISION N/A 2016    one abnormal after, clear since that time    COLONOSCOPY N/A 8/6/2024    Procedure: COLONOSCOPY;  Surgeon: Crispin Ardon MD;  Location: [address]);  Service: Endoscopy;  Laterality: N/A;    COLPOSCOPY      ESOPHAGOGASTRODUODENOSCOPY N/A 8/6/2024    Procedure: EGD (ESOPHAGOGASTRODUODENOSCOPY);  Surgeon: Crispin Ardon MD;  Location: [address]);  Service: Endoscopy;  Laterality: N/A;  6/14/24- case length adjusted - ERW     Family History   Problem Relation Name Age of Onset    Melanoma Mother Danya     Basal cell carcinoma Mother Danya     Cancer Mother Danya      Hypertension Mother Danya     Miscarriages / Stillbirths Mother Danya     Skin cancer Brother  30    Heart attack Maternal Grandfather  60    Colon cancer Maternal Uncle  45    Arthritis Maternal Grandmother M     Heart disease Maternal Grandmother M      Social History     Tobacco Use    Smoking status: Never    Smokeless tobacco: Never    Tobacco comments:     Occasional social smoking rarely   Substance Use Topics    Alcohol use: Yes     Comment: occasionally    Drug use: Never     Review of Systems   Constitutional:  Negative for fever.   HENT:  Negative for sore throat.    Respiratory:  Negative for shortness of breath.    Cardiovascular:  Negative for chest pain.   Gastrointestinal:  Negative for nausea.   Genitourinary:  Negative for dysuria.   Musculoskeletal:  Negative for back pain.   Skin:  Negative for rash.   Neurological:  Positive for speech difficulty and numbness. Negative for weakness.   Hematological:  Does not bruise/bleed easily.       Physical Exam     Initial Vitals [12/07/24 1649]   BP Pulse Resp Temp SpO2   (!) 149/99 100 16 99.1 °F (37.3 °C) 96 %      MAP       --         Physical Exam    Nursing note and vitals reviewed.  Constitutional: She appears well-developed and well-nourished. She is not diaphoretic. No distress.   HENT:   Head: Normocephalic and atraumatic.   Eyes: EOM are normal. Pupils are equal, round, and reactive to light.   Neck: Neck supple.   Normal range of motion.  Cardiovascular:  Normal rate and regular rhythm.           Pulmonary/Chest: Breath sounds normal.   Abdominal: Abdomen is soft. Bowel sounds are normal. She exhibits no distension. There is no abdominal tenderness. There is no rebound.   Musculoskeletal:         General: No tenderness or edema. Normal range of motion.      Cervical back: Normal range of motion and neck supple.     Neurological:   A&O x3.  There is a mild expressive aphasia however remainder of cranial nerve testing is normal.  Sensation mildly  decreased over the right side of the face compared to the left however no facial asymmetry noted.    Strength:   5/5 right elbow flexion, extension, .  5/5 left elbow flexion, extension, .  5/5 right hip extension, flexion, plantar and dorsiflexion.  5/5 left hip extension, flexion, plantar and dorsiflexion.  Normal finger-to-nose.  Normal heel to shin.     Skin: Skin is warm and dry. Capillary refill takes less than 2 seconds.         ED Course   Procedures  Labs Reviewed   CBC W/ AUTO DIFFERENTIAL - Abnormal       Result Value    WBC 9.38      RBC 4.21      Hemoglobin 13.0      Hematocrit 41.9       (*)     MCH 30.9      MCHC 31.0 (*)     RDW 13.4      Platelets 267      MPV 11.4      Immature Granulocytes 0.1      Gran # (ANC) 6.1      Immature Grans (Abs) 0.01      Lymph # 2.8      Mono # 0.4      Eos # 0.1      Baso # 0.08      nRBC 0      Gran % 64.7      Lymph % 29.4      Mono % 4.2      Eosinophil % 0.7      Basophil % 0.9      Differential Method Automated     COMPREHENSIVE METABOLIC PANEL - Abnormal    Sodium 140      Potassium 3.6      Chloride 107      CO2 19 (*)     Glucose 80      BUN 8      Creatinine 0.8      Calcium 9.8      Total Protein 8.0      Albumin 4.4      Total Bilirubin 0.2      Alkaline Phosphatase 28 (*)     AST 25      ALT 15      eGFR >60.0      Anion Gap 14      Narrative:     ADD ON HCG QUANTITATIVE PER DR ASHKAN CHILDS/ORDER# [id number] @   17:35   LIPID PANEL - Abnormal    Cholesterol 199      Triglycerides 156 (*)     HDL 68      LDL Cholesterol 99.8      HDL/Cholesterol Ratio 34.2      Total Cholesterol/HDL Ratio 2.9      Non-HDL Cholesterol 131      Narrative:     ADD ON HCG QUANTITATIVE PER DR ASHKAN CHILDS/ORDER# [id number] @   17:35   ISTAT PROCEDURE - Abnormal    POC PTWBT 12.8      POC PTINR 1.3 (*)     Sample unknown     PROTIME-INR    Prothrombin Time 10.6      INR 1.0     TSH    TSH 1.829      Narrative:     ADD ON HCG QUANTITATIVE PER DR LUTHER  AMADEO/ORDER# [id number] @   17:35   HCG, QUANTITATIVE   HCG, QUANTITATIVE    HCG Quant <2.4      Narrative:     ADD ON HCG QUANTITATIVE PER DR ASHKAN CHILDS/ORDER# [id number] @   17:35   POCT GLUCOSE    POCT Glucose 87     ISTAT CREATININE    POC Creatinine 0.8      Sample unknown          ECG Results              ECG 12 lead (Final result)        Collection Time Result Time QRS Duration OHS QTC Calculation    12/07/24 17:23:41 12/08/24 10:32:37 84 477                     Final result by Interface, Lab In [facility] (12/08/24 10:32:40)                   Narrative:    Test Reason : R29.818,    Vent. Rate :  93 BPM     Atrial Rate :  93 BPM     P-R Int : 124 ms          QRS Dur :  84 ms      QT Int : 384 ms       P-R-T Axes :  60  81  66 degrees    QTcB Int : 477 ms    Normal sinus rhythm  Normal ECG  No previous ECGs available  Confirmed by Eduardo Lu ([id number]) on 12/8/2024 10:32:36 AM    Referred By: AAAREFERRAL SELF           Confirmed By: Eduardo Lu                                  Imaging Results              MRI Pituitary W W/O Contrast (Final result)  Result time 12/07/24 21:09:42   Procedure changed from MRI Brain Without Contrast     Final result by Dimas Hernandez MD (12/07/24 21:09:42)                   Impression:      -0.9 cm nonenhancing intrasellar lesion favored to represent an incidental pituitary microadenoma.    -no acute intracranial process.  Specifically, no evidence of acute infarction.    Electronically signed by resident: Noelle Nino  Date:    12/07/2024  Time:    20:27    Electronically signed by: Dimas Hernandez MD  Date:    12/07/2024  Time:    21:09               Narrative:    EXAMINATION:  MRI PITUITARY W W/O CONTRAST    CLINICAL HISTORY:  Neuro deficit, acute, stroke suspected;Acute focal neurological deficit;    TECHNIQUE:  Multiplanar multisequence MR imaging of the brain and sella was performed before and after the administration of 6 ml Gadavist intravenous  contrast.    COMPARISON:  CTA stroke 12/07/2024.    FINDINGS:  The ventricles are normal in size.  No hydrocephalus.    The brain appears within normal limits.  No parenchymal mass, hemorrhage, edema or recent or remote major vascular distribution infarct.  No abnormal postcontrast parenchymal or leptomeningeal enhancement.    The pituitary gland is normal in size with homogeneous signal characteristics on precontrast images.  Normal posterior pituitary bright spot is present.  Postcontrast images reveal a 0.9 x 0.5 cm nonenhancing T1/T2 isointense intrasellar lesion.  No suprasellar mass effect.  The infundibulum is not thickened or displaced.    No extra-axial blood or fluid collections.    The T2 skull base flow voids are preserved.  Bone marrow signal intensity unremarkable.    Patchy mucosal thickening in the left maxillary antra.                                        CTA STROKE MULTI-PHASE (Final result)  Result time 12/07/24 19:12:57      Final result by Dimas Hernandez MD (12/07/24 19:12:57)                   Impression:      No evidence intracranial hemorrhage, major vascular distribution infarct, large vessel occlusion, or high-grade vascular stenosis, noting suboptimal contrast bolus timing.  MRI brain could be obtained if further evaluation is required.    Subcentimeter dense focus along the posterior aspect of pituitary gland.  This can further be assessed on MR imaging with dedicated pituitary protocol.    This report was flagged in Epic as abnormal.    Electronically signed by resident: Don Byrne  Date:    12/07/2024  Time:    18:35    Electronically signed by: Dimas Hernandez MD  Date:    12/07/2024  Time:    19:12               Narrative:    EXAMINATION:  CTA STROKE MULTI-PHASE    CLINICAL HISTORY:  Neuro deficit, acute, stroke suspected;    TECHNIQUE:  Axial CT images obtained throughout the region of the head before the administration of intravenous contrast.    CT angiogram was performed through  the cervical and intracranial vasculature during the IV bolus administration of 100mL of Omnipaque 350.  Two additional phases through the intracranial vasculature via multiphase technique.    Multiplanar MPR and MIP reformats were performed.    CT source data was analyzed using artificial intelligence software for detection of a large vessel occlusions (LVO) in order to enable computer assisted triage notification and aid clinical stroke decision making.    COMPARISON:  MRA neck 10/23/2024 MRI cervical spine 10/23/2024    FINDINGS:  The ventricles are normal in size without evidence of hydrocephalus.    The brain appears within normal limits. No parenchymal mass, hemorrhage, edema or major vascular distribution infarct.  0.6 cm dense focus in the posterior aspect of the pituitary.    No extra-axial blood or fluid collections.    The cranium appears intact.  Mucous retention cyst left maxillary sinus.  The remaining paranasal sinuses and mastoid air cells are essentially clear.  Metallic density ring about the left nostril.      CTA:    Suboptimal contrast bolus and timing limits evaluation.    The aortic arch demonstratesnormal caliber, no calcifications, and no significant stenosis at the major branch vessel origins.    The right common carotid artery is normal in caliber.  No significant stenosis at the carotid bifurcation.The right internal carotid artery is normal in caliber.    The left common carotid artery is normal in caliber with mild atherosclerotic calcification..  No significant stenosis at the carotid bifurcation.The left internal carotid artery is normal in caliber.    The right vertebral artery is normal in caliber.    The left vertebral artery is normal in caliber.    Basilar artery is within normal limits without focal abnormality.    The proximal anterior, middle, and posterior cerebral arteries are within normal limits.  No evidence of significant stenosis, focal occlusion, or intracranial  aneurysm.    Additional CT findings: Lung apices are clear.No mediastinal lymphadenopathy.  Thyroid gland appears normal.  No acute fracture or osseous destructive lesion.                                       Medications   acetaminophen tablet 1,000 mg (1,000 mg Oral Given 12/7/24 1738)   ketorolac injection 9.999 mg (9.999 mg Intravenous Given 12/7/24 1741)   iohexoL (OMNIPAQUE 350) injection 100 mL (100 mLs Intravenous Given 12/7/24 1736)   proparacaine 0.5 % ophthalmic solution 1 drop (1 drop Right Eye Given 12/7/24 1845)   diazePAM tablet 2 mg (2 mg Oral Given 12/7/24 1903)   gadobutroL (GADAVIST) injection 6 mL (6 mLs Intravenous Given 12/7/24 1959)   dexAMETHasone injection 4 mg (4 mg Intravenous Given 12/7/24 2214)   sodium chloride 0.9% bolus 1,000 mL 1,000 mL (1,000 mLs Intravenous New Bag 12/7/24 2216)   prochlorperazine injection Soln 2.5 mg (2.5 mg Intravenous Given 12/7/24 2214)     Medical Decision Making  29-year-old female with a history of right arm radiculopathy who presents to the ED for speech difficulty and tingling to the right side of the face.  She presents today after his symptoms began however based on her initial history, she would still potentially be in window for LVO though I think this is overall less likely.  Given this however we did activate as a code stroke so that rapid imaging could be obtained.  Ultimately her CTA imaging did not reveal evidence of acute infarct or large vessel occlusion.  It did demonstrate an incidental adenoma, and given the persistence of her headache, we elected to obtain MRI for further evaluation.    The remainder of her workup including blood work and EKG did not demonstrate an obvious emergent cause of her symptoms today.  Her care was ultimately signed out to oncoming attending at shift change pending repeat evaluation after migraine cocktail with likely discharge home for outpatient follow up should her symptoms be improved.    Amount and/or  Complexity of Data Reviewed  Labs: ordered.  Radiology: ordered.    Risk  OTC drugs.  Prescription drug management.               ED Course as of 12/13/24 1653   Sat Dec 07, 2024   1729 Discussed with vascular neurology, no tnk (out of window). After further discussion with patient, it was clarified, symptoms began around noon yesterday, (>24 hours ago), so would not be IR candidate. Will continue with workup, attempt to treat headache, consideration for MRI given persistent symptoms. [MB]   1852 Right eye iop 19.  [MB]   1933 CTA every reviewed, no evidence of intracranial hemorrhage or LV 0.  There is a subcentimeter focus in the posterior pituitary.  Discussed with Radiology tech, we will alter MRI study to obtain MRI pituitary which will include brain study as well. [MB]   2155 Patient re-evaluated and discussed MRI findings including incidental finding of pituitary microadenoma.  She reports that her right-sided headache and eye pain or worse now.  There is no evidence of acute CVA or other emergent pathology on imaging thus far.  We will give further migraine medications, and likely plan for discharge home with continued outpatient medications, and follow up with PCP for further testing as indicated for her incidental adenoma. [MB]      ED Course User Index  [MB] Dominik Moreno MD                           Clinical Impression:  Final diagnoses:  [R29.818] Acute focal neurological deficit  [R20.0] Numbness  [D35.2] Pituitary adenoma  [R51.9] Nonintractable headache, unspecified chronicity pattern, unspecified headache type (Primary)          ED Disposition Condition    Discharge Stable          ED Prescriptions    None       Follow-up Information       Follow up With Specialties Details Why Contact Info Additional Information    Anastasia Sung MD Internal Medicine Schedule an appointment as soon as possible for a visit in 3 days For a follow up appointment [address]  [facility]  [id number]  [phone]       Nikko Pena - Neurology [facility] Neurology Schedule an appointment as soon as possible for a visit   [address]  [facility] [id number] [phone] Neuroscience [facility], 7th Floor Please park in [facility] and take Clinic elevator             Dominik Moreno MD  12/13/24 [id number]

## 2024-12-07 NOTE — PROGRESS NOTES
"Subjective:      Patient ID: Devora Meeks is a 29 y.o. female.    Vitals:  height is 5' 3" (1.6 m) and weight is 56.2 kg (124 lb). Her oral temperature is 98 °F (36.7 °C). Her blood pressure is 150/89 (abnormal) and her pulse is 100. Her respiration is 20 and oxygen saturation is 98%.     Chief Complaint: Numbness    29 year old female c/o left side of face numbness & tingling along w/ eye pressure that began 2 months ago.  Patient states today she has been having speech slur off & on today.   Patient states her face does feel heavy and feels like it is drooping.     Provider note starts below.    30 yo F with no significant PMHx here with speech production difficulty and subjective tingling of right side of face. Also with right eye pain/pressure and blurriness which is intermittent. She reports right arm numbness. No weakness or difficulty with ambulation, no nausea or vomiting, no HA. States symptoms started yesterday morning/noon and have been waxing and waning since. She went to work today and around 730am noticed the difficulty with her speech(getting words out). Speech progressively worsened so she is here now. Also notes, her coworkers noticed her speech was off. States she feels the right side of her face feels heavy. No drooling. She had similar episode about a month or so ago following vagal maneuver therapy with PT for shoulder impingement. She is not blood thinners.     Facial Pain  This is a new problem. The current episode started more than 1 month ago. The problem occurs constantly. The problem has been gradually worsening. Associated symptoms include headaches, numbness, a visual change and weakness. Pertinent negatives include no abdominal pain, chest pain, chills, congestion, coughing, diaphoresis, fatigue, fever, joint swelling, myalgias, nausea, neck pain, rash, sore throat, swollen glands, urinary symptoms, vertigo or vomiting. Nothing aggravates the symptoms. She has tried nothing " for the symptoms.       Constitution: Negative for chills, sweating, fatigue and fever.   HENT:  Negative for congestion and sore throat.    Neck: Negative for neck pain.   Cardiovascular:  Negative for chest pain.   Respiratory:  Negative for cough.    Gastrointestinal:  Negative for abdominal pain, nausea and vomiting.   Musculoskeletal:  Negative for joint swelling and muscle ache.   Skin:  Negative for rash.   Neurological:  Positive for headaches and numbness. Negative for history of vertigo.      Objective:     Physical Exam   Constitutional: No distress.      Comments:Teary on exam as she states she is frustrated due to the difficulty getting words out     HENT:   Head: Normocephalic and atraumatic.   Ears:   Right Ear: External ear normal.   Left Ear: External ear normal.   Nose: Nose normal.   Mouth/Throat: Mucous membranes are moist. No oropharyngeal exudate.   Eyes: Conjunctivae are normal. Pupils are equal, round, and reactive to light. Right eye exhibits no discharge. Left eye exhibits no discharge. Extraocular movement intact      Comments: No gaze preference  EOMI   VF Intact(able to count and name fingers appropriately in all four quadrants)   Cardiovascular: Normal rate.   Pulmonary/Chest: Effort normal. No respiratory distress.   Neurological: She is alert.      Comments: NIHSS 1 for sujective sensory changes to right side of face and right arm  5/5 strength throughout  No aphasia per NIHSS cards but does have difficulty with other words such as telling me where she parked her car  No neglect   Skin: Skin is warm and dry.   Psychiatric: Her behavior is normal. Thought content normal.   Nursing note and vitals reviewed.      Assessment:     1. Numbness and tingling of right side of face    2. Right arm numbness        Plan:       Numbness and tingling of right side of face  -     Refer to Emergency Dept.    Right arm numbness  -     Refer to Emergency Dept.      28 yo F with no significant PMHX here  for evaluation of right sided facial and arm numbness, intermittent right eye blurriness as well as speech production difficulty. Facial and arm numbness started yesterday morning/noon. Speech production difficulty and right eye blurriness was noted today when she got to work around 730am. States episode of numbness has been waxing and waning but not completely resolved, right eye blurriness and pain/pressure has been intermittent and states speech progressively worsened throughout the day. Speech changes also noted by  coworker who told her to get evaluated. Vitals are stable except for slightly elevated BP. Subjective pain with EOM Right eye reported during exam. VF intact at this time, able to count all fingers appropriately in all 4 quadrants. NIHSS 1 for subjective sensory changes to right side of face and arm. No aphasia per NIHSS card but does seem to have difficulty with other words for example when providing her history. Patient not a candidate for acute intervention at this time given sx onset>24hr, NIHSS1, and VAN -. However, given speech changes and right sided facial and arm numbness discussed with collaborating physician Dr Grande and ED physician Dr. Ford who recommended/approved patient to be transferred to [facility] for further evaluation, possibly obtaining MRI Brain WO to definitely rule out an acute stroke. Patient may also benefit from ophtho eval for eye pain/pressure. EMS offered to patient who declined. Neighbor came to pick her up and instructions were given on where to go from here. Ddx discussed with patient. Questions and concerns addressed.

## 2024-12-07 NOTE — PLAN OF CARE-OVED
[facility] Emergency Department Plan of Care Note    Referral source: Urgent Care      Discussed patient with: Urgent Care Provider      Reason for consult:  vision change,numbness,difficulty speaking  Hi, I have a 28 yo F with no significant PMHx here with speech production difficulty and subjective tingling of right side of face. Also with right eye pain and blurriness. States symptoms started yesterday and been waxing and waning since. She went to work today and around 730am noticed the difficulty with her speech(getting words out). Speech progressively worsened so she is here now. She had similar episode about a month or so ago following vagal maneuver therapy with PT for shoulder impingement. Vital are stable. NIHSS 1 for subjective sensory changes to right side of face and arm. Otherwise physical exam is unremarkable. No aphasia per the NIHSS picture but she does have difficulty with other words.     Given her presentation I was thinking she would benefit from going to the ER for MRI Brain WO to definitely rule out an acute stroke. She is out of the window for TNK and is VAN- but with her speech changes and sensory changes to right side of face and arm, I think the MRI could rule out an acute stroke. Please advise. Thanks     Medical Record Review: ADHD      Disposition recommended: TO ED ASAP (Main campus with stroke availability if needed)      Additional Recommendations: none      Lora Pereira MD

## 2024-12-07 NOTE — ED TRIAGE NOTES
Pt presents to ED for c/o right sided facial tingling/numbness as well as right sided headache and visual changes starting yesterday at noon. Pt also endorses slowing of speech beginning earlier today. Pt denies any unilateral weakness or other c/o. +headache. AAOX4

## 2024-12-08 LAB
OHS QRS DURATION: 84 MS
OHS QTC CALCULATION: 477 MS

## 2024-12-08 NOTE — SUBJECTIVE & OBJECTIVE
Past Medical History:   Diagnosis Date    Abnormal Pap smear of cervix      Past Surgical History:   Procedure Laterality Date    CERVICAL BIOPSY  W/ LOOP ELECTRODE EXCISION N/A 2016    one abnormal after, clear since that time    COLONOSCOPY N/A 8/6/2024    Procedure: COLONOSCOPY;  Surgeon: Crispin Ardon MD;  Location: [facility] ([address] [place] [address]);  Service: Endoscopy;  Laterality: N/A;    COLPOSCOPY      ESOPHAGOGASTRODUODENOSCOPY N/A 8/6/2024    Procedure: EGD (ESOPHAGOGASTRODUODENOSCOPY);  Surgeon: Crispin Ardon MD;  Location: [facility] ([address] [place] [address]);  Service: Endoscopy;  Laterality: N/A;  6/14/24- case length adjusted - ERW     Social History     Tobacco Use    Smoking status: Never    Smokeless tobacco: Never    Tobacco comments:     Occasional social smoking rarely   Substance Use Topics    Alcohol use: Yes     Comment: occasionally    Drug use: Never     Review of patient's allergies indicates:  No Known Allergies    Medications: I have reviewed the current medication administration record.    (Not in a hospital admission)      Review of Systems   Constitutional:  Negative for fatigue.   HENT:  Positive for tinnitus.    Eyes:  Positive for pain and visual disturbance.   Respiratory:  Negative for wheezing.    Cardiovascular:  Negative for chest pain.   Gastrointestinal:  Positive for vomiting.   Musculoskeletal:  Positive for arthralgias.   Neurological:  Positive for dizziness, facial asymmetry (subjective, R face heaviness), speech difficulty, light-headedness, numbness and headaches.     Objective:     Vital Signs (Most Recent):  Temp: 99.1 °F (37.3 °C) (12/07/24 1649)  Pulse: 81 (12/07/24 1730)  Resp: 20 (12/07/24 1730)  BP: 127/69 (12/07/24 1730)  SpO2: 100 % (12/07/24 1730)    Vital Signs Range (Last 24H):  Temp:  [98 °F (36.7 °C)-99.1 °F (37.3 °C)]   Pulse:  []   Resp:  [16-20]   BP: (127-150)/(69-99)   SpO2:  [96 %-100 %]        Physical Exam  Vitals and nursing note reviewed.  "  Constitutional:       General: She is not in acute distress.  HENT:      Head: Normocephalic.      Nose: Nose normal.   Eyes:      Extraocular Movements: Extraocular movements intact.      Conjunctiva/sclera: Conjunctivae normal.   Cardiovascular:      Rate and Rhythm: Normal rate.   Pulmonary:      Effort: Pulmonary effort is normal. No respiratory distress.   Skin:     General: Skin is warm.   Neurological:      Mental Status: She is alert.      Comments: See below for neuro exam   Psychiatric:         Attention and Perception: Attention normal.         Mood and Affect: Affect is tearful.         Behavior: Behavior is cooperative.              Neurological Exam:   LOC: alert  Attention Span: Good   Language: No aphasia  Articulation: Intermittent stuttering, no dysarthria  Orientation: Person, Place, Time   Visual Fields: Full  EOM (CN III, IV, VI): Full/intact  Facial Sensation (CN V): Subjective R facial paresthesia  Facial Movement (CN VII): Symmetric facial expression    Motor:   --LUE Normal 5/5  --LLE Normal 5/5  --RUE Normal 5/5  --RLE Normal 5/5  Sensation: Subjective RUE paresthesia        Laboratory:  CMP:   Recent Labs   Lab 12/07/24  1729   CALCIUM 9.8   ALBUMIN 4.4   PROT 8.0      K 3.6   CO2 19*      BUN 8   CREATININE 0.8   ALKPHOS 28*   ALT 15   AST 25   BILITOT 0.2     CBC:   Recent Labs   Lab 12/07/24  1729   WBC 9.38   RBC 4.21   HGB 13.0   HCT 41.9      *   MCH 30.9   MCHC 31.0*     Lipid Panel:   Recent Labs   Lab 12/07/24  1729   CHOL 199   LDLCALC 99.8   HDL 68   TRIG 156*     Coagulation:   Recent Labs   Lab 12/07/24  1729   INR 1.0     Hgb A1C: No results for input(s): "HGBA1C" in the last 168 hours.    TSH: No results for input(s): "TSH" in the last 168 hours.    Diagnostic Results:      Brain/Vessel Imaging:  CTA stroke multiphase 12/7/2024  Imaging independently reviewed and interpreted, findings discussed with  fellow Dr. Tello as well as  staff Dr." Vinita who are in agreement: CTH negative for evidence of acute ICH or major territory infarct, CTA limited due to poor contrast timing however no definitive LVO appreciated.   Radiology read pending

## 2024-12-08 NOTE — PROVIDER PROGRESS NOTES - EMERGENCY DEPT.
Signout Note    I received signout from the previous provider.     Chief complaint:  Numbness (Right side facial numbness, started around noon yesterday. Eye blurriness since this morning around 730. Also reports difficulty speaking)      Pertinent history &exam:  Devora Meeks is a 29 y.o. female who presented to emergency department with complaint of facial heaviness and numbness and speech difficulty.  Patient was a stroke code.  CTA without large vessel occlusion but concern for pituitary adenoma.  MRI pituitary obtained, consistent with microadenoma.  She had a headache, and vascular Neurology felt presentation was most consistent with complex migraine.  She had improvement of headache with initial round of pain medication but not totally improved.  Signed out to me pending additional medication administration for anticipated discharge home    Vitals:    12/07/24 2215   BP: 119/65   Pulse: 67   Resp: 16   Temp: 98 °F (36.7 °C)       Imaging Studies:    MRI Pituitary W W/O Contrast   Final Result      -0.9 cm nonenhancing intrasellar lesion favored to represent an incidental pituitary microadenoma.      -no acute intracranial process.  Specifically, no evidence of acute infarction.      Electronically signed by resident: Noelle Nino   Date:    12/07/2024   Time:    20:27      Electronically signed by: Dimas Hernandez MD   Date:    12/07/2024   Time:    21:09      CTA STROKE MULTI-PHASE   Final Result   Abnormal      No evidence intracranial hemorrhage, major vascular distribution infarct, large vessel occlusion, or high-grade vascular stenosis, noting suboptimal contrast bolus timing.  MRI brain could be obtained if further evaluation is required.      Subcentimeter dense focus along the posterior aspect of pituitary gland.  This can further be assessed on MR imaging with dedicated pituitary protocol.      This report was flagged in Epic as abnormal.      Electronically signed by resident: Don Byrne    Date:    12/07/2024   Time:    18:35      Electronically signed by: Dimas Hernandez MD   Date:    12/07/2024   Time:    19:12          Medications Given:  Medications   acetaminophen tablet 1,000 mg (1,000 mg Oral Given 12/7/24 1738)   ketorolac injection 9.999 mg (9.999 mg Intravenous Given 12/7/24 1741)   iohexoL (OMNIPAQUE 350) injection 100 mL (100 mLs Intravenous Given 12/7/24 1736)   proparacaine 0.5 % ophthalmic solution 1 drop (1 drop Right Eye Given 12/7/24 1845)   diazePAM tablet 2 mg (2 mg Oral Given 12/7/24 1903)   gadobutroL (GADAVIST) injection 6 mL (6 mLs Intravenous Given 12/7/24 1959)   dexAMETHasone injection 4 mg (4 mg Intravenous Given 12/7/24 2214)   sodium chloride 0.9% bolus 1,000 mL 1,000 mL (1,000 mLs Intravenous New Bag 12/7/24 2216)   prochlorperazine injection Soln 2.5 mg (2.5 mg Intravenous Given 12/7/24 2214)       Pending Items/ MDM:  29 y.o. female with the above complaint.      I was contacted by nursing staff, patient states headache had resolved, she was discharged    Diagnostic Impression:    1. Nonintractable headache, unspecified chronicity pattern, unspecified headache type    2. Acute focal neurological deficit    3. Numbness    4. Pituitary adenoma         ED Disposition Condition    Discharge Stable          ED Prescriptions    None       Follow-up Information       Follow up With Specialties Details Why Contact Info Additional Information    Anastasia Sung MD Internal Medicine Schedule an appointment as soon as possible for a visit in 3 days For a follow up appointment [address]  [place] [address]  [phone]       [facility] - Neurology [facility] Neurology Schedule an appointment as soon as possible for a visit   [address] [facility] [id number] [phone] [facility] [place] - [facility] Building, 7th Floor Please park in [facility] and take Clinic elevator              Dorothea Camarillo MD  Emergency Medicine

## 2024-12-08 NOTE — DISCHARGE INSTRUCTIONS
You were seen in the Emergency Department today for headache, facial numbness, and trouble with your speech. We performed a CT and MRI of the brain, which showed an incidental finding of a benign pituitary adenoma. These imaging tests did not show findings of bleeding in the brain or a stroke.    I would also encourage you to discuss these symptoms when you see your neurologist for you appointment on 12/12/24.    Please follow up with your primary doctor in the next 1 week for a repeat evaluation and further management, and to discuss the incidental findings    Please return to the Emergency Department immediately for any continued or worsening symptoms such as numbness, tingling weakness in an arm or leg.    You may take Tylenol (Acetaminophen) 1000 mg every 6 hours (no more than 4000 mg in 24 hours) and Motrin (Ibuprofen) 400 mg every 6 hours as needed for aches/pains/fevers.

## 2024-12-08 NOTE — ASSESSMENT & PLAN NOTE
Devora Meeks is a 29 y.o. female with no significant medical history that presents to the ED from urgent care for further eval of stroke-like symptoms. Patient activated as a stroke code on arrival to ED. LKW 12pm 12/6, >24 hrs PTA at [facility]. Patient reports around noon on 12/6 began having R sided facial and arm paresthesia, then 12/6 evening noticed difficulty talking. The subjective paresthesia and speech difficulty have persisted since onset without resolution/improvement. Additionally, she states this morning (12/7) developed R eye/retro-orbital pain as well as R sided HA with associated nausea and intermittent dizziness/near syncope. Denies hx of prior similar episodes, focal weakness, recent life changes/stressors, recent viral illness, hx of migraines.     Patient tearful during eval and is concerned she is having a stroke. On exam patient reports subjective R face/arm paresthesia and intermittently noted to have stuttering speech, neuro exam otherwise nonfocal. NIHSS 1. CTH/CTA stroke multiphase negative for acute ICH, major territory infarct, or definite LVO although vessel imaging limited due to poor contrast. Determined not a candidate for acute stroke interventions, OOW for TNK/thrombectomy given LKW >24hrs prior.    Recommendations:  --Etiology of clinical presentation unclear at this time, however acute stroke felt less likely as cause given patients age/lack of vascular risk factors as well as patient reporting currently having R sided HA  --Recommend symptomatic treatment of HA, nausea, and dizziness and re-assess for improvement in paresthesia/speech difficulty with resolution of HA/dizziness, as symptoms potentially secondary to HA  --Defer stat MRI brain at this time given suspected stroke mimic/alternative dx. Should symptoms persist despite appropriate treatment of HA/dizziness and concern for acute cerebrovascular etiology remains, can obtain MRI brain without contrast for further  eval to definitively r/o stroke.  --Ongoing workup/management per ED team  --We will follow up on MRI and provide any additional recs pending results. If MRI imaging negative for evidence of acute infarct, no further stroke workup indicated and VN will sign off.  --Please contact stroke team with any questions/concerns

## 2024-12-08 NOTE — HPI
Devora Meeks is a 29 y.o. female with no significant medical history that presents to the ED from urgent care for further eval of stroke-like symptoms. Patient activated as a stroke code on arrival to ED. LKW 12pm 12/6, >24 hrs PTA at [facility]. Patient reports around noon on 12/6 began having R sided facial and arm paresthesia and tingling, then 12/6 evening noticed difficulty talking. The subjective paresthesia and speech difficulty have persisted since onset without resolution/improvement. Additionally, she states this morning (12/7) developed R eye/retro-orbital pain as well as R sided HA with associated nausea and intermittent dizziness/near syncope. Denies hx of prior similar episodes, focal weakness, recent life changes/stressors, recent viral illness, hx of migraines. Patient tearful during eval and is concerned she is having a stroke. On exam patient reports subjective R face/arm paresthesia and intermittently noted to have stuttering speech, neuro exam otherwise nonfocal. NIHSS 1. CTH/CTA stroke multiphase negative for acute ICH, major territory infarct, or definite LVO although vessel imaging limited due to poor contrast. Determined not a candidate for acute stroke interventions, OOW for TNK/thrombectomy given LKW >24hrs prior.

## 2024-12-08 NOTE — CONSULTS
[facility] - Emergency Dept  Vascular Neurology  [facility]  Consult Note    Inpatient consult to Vascular (Stroke) Neurology  Consult performed by: Linda Jin PA-C  Consult ordered by: Dominik Moreno MD  Reason for consult: Stroke code, LKW >24hrs        Assessment/Plan:     Numbness and tingling of right side of face  Devora Meeks is a 29 y.o. female with no significant medical history that presents to the ED from urgent care for further eval of stroke-like symptoms. Patient activated as a stroke code on arrival to ED. LKW 12pm 12/6, >24 hrs PTA at [facility]. Patient reports around noon on 12/6 began having R sided facial and arm paresthesia, then 12/6 evening noticed difficulty talking. The subjective paresthesia and speech difficulty have persisted since onset without resolution/improvement. Additionally, she states this morning (12/7) developed R eye/retro-orbital pain as well as R sided HA with associated nausea and intermittent dizziness/near syncope. Denies hx of prior similar episodes, focal weakness, recent life changes/stressors, recent viral illness, hx of migraines.     Patient tearful during eval and is concerned she is having a stroke. On exam patient reports subjective R face/arm paresthesia and intermittently noted to have stuttering speech, neuro exam otherwise nonfocal. NIHSS 1. CTH/CTA stroke multiphase negative for acute ICH, major territory infarct, or definite LVO although vessel imaging limited due to poor contrast. Determined not a candidate for acute stroke interventions, OOW for TNK/thrombectomy given LKW >24hrs prior.    Recommendations:  --Etiology of clinical presentation unclear at this time, however acute stroke felt less likely as cause given patients age/lack of vascular risk factors as well as patient reporting currently having R sided HA  --Recommend symptomatic treatment of HA, nausea, and dizziness and re-assess for improvement in  paresthesia/speech difficulty with resolution of HA/dizziness, as symptoms potentially secondary to HA  --Defer stat MRI brain at this time given suspected stroke mimic/alternative dx. Should symptoms persist despite appropriate treatment of HA/dizziness and concern for acute cerebrovascular etiology remains, can obtain MRI brain without contrast for further eval to definitively r/o stroke.  --Ongoing workup/management per ED team  --We will follow up on MRI and provide any additional recs pending results. If MRI imaging negative for evidence of acute infarct, no further stroke workup indicated and VN will sign off.  --Please contact stroke team with any questions/concerns        STROKE DOCUMENTATION     Acute Stroke Times   Last Known Normal Date: 12/06/24  Last Known Normal Time: 1200  Symptom Onset Date: 12/06/24  Symptom Onset Time: 1200  Stroke Team Called Date: 12/07/24  Stroke Team Called Time: 1655  Stroke Team Arrival Date: 12/07/24  Stroke Team Arrival Time: 1655  CT Interpretation Time: 1710  Thrombolytic Therapy Recommended: No  CTA Interpretation Time: 1715  Thrombectomy Recommended: No    NIH Scale:  1a. Level of Consciousness: 0-->Alert, keenly responsive  1b. LOC Questions: 0-->Answers both questions correctly  1c. LOC Commands: 0-->Performs both tasks correctly  2. Best Gaze: 0-->Normal  3. Visual: 0-->No visual loss  4. Facial Palsy: 0-->Normal symmetrical movements  5a. Motor Arm, Left: 0-->No drift, limb holds 90 (or 45) degrees for full 10 secs  5b. Motor Arm, Right: 0-->No drift, limb holds 90 (or 45) degrees for full 10 secs  6a. Motor Leg, Left: 0-->No drift, leg holds 30 degree position for full 5 secs  6b. Motor Leg, Right: 0-->No drift, leg holds 30 degree position for full 5 secs  7. Limb Ataxia: 0-->Absent  8. Sensory: 1-->Mild-to-moderate sensory loss, patient feels pinprick is less sharp or is dull on the affected side, or there is a loss of superficial pain with pinprick, but patient  is aware of being touched  9. Best Language: 0-->No aphasia, normal  10. Dysarthria: 0-->Normal  11. Extinction and Inattention (formerly Neglect): 0-->No abnormality  Total (NIH Stroke Scale): 1    Modified [place] Score: 0  Alessandra Coma Scale:    ABCD2 Score:    [id number] Score:   HAS -BLED Score:   ICH Score:   Hunt & Morejon Classification:       Thrombolysis Candidate? No, Out of window - Symptom onset > 4.5 hours (LKW > 24hrs ago)    Delays to Thrombolysis?  Not Applicable    Interventional Revascularization Candidate?   Is the patient eligible for mechanical endovascular reperfusion (TAQUERIA)?  No; no significant neurologic deficit (NIHSS <6)     Delays to Thrombectomy? Not Applicable    Hemorrhagic change of an Ischemic Stroke: Does this patient have an ischemic stroke with hemorrhagic changes? No     Subjective:     History of Present Illness:  Devora Meeks is a 29 y.o. female with no significant medical history that presents to the ED from urgent care for further eval of stroke-like symptoms. Patient activated as a stroke code on arrival to ED. LKW 12pm 12/6, >24 hrs PTA at [facility]. Patient reports around noon on 12/6 began having R sided facial and arm paresthesia and tingling, then 12/6 evening noticed difficulty talking. The subjective paresthesia and speech difficulty have persisted since onset without resolution/improvement. Additionally, she states this morning (12/7) developed R eye/retro-orbital pain as well as R sided HA with associated nausea and intermittent dizziness/near syncope. Denies hx of prior similar episodes, focal weakness, recent life changes/stressors, recent viral illness, hx of migraines. Patient tearful during eval and is concerned she is having a stroke. On exam patient reports subjective R face/arm paresthesia and intermittently noted to have stuttering speech, neuro exam otherwise nonfocal. NIHSS 1. CTH/CTA stroke multiphase negative for acute ICH, major territory infarct,  or definite LVO although vessel imaging limited due to poor contrast. Determined not a candidate for acute stroke interventions, OOW for TNK/thrombectomy given LKW >24hrs prior.          Past Medical History:   Diagnosis Date    Abnormal Pap smear of cervix      Past Surgical History:   Procedure Laterality Date    CERVICAL BIOPSY  W/ LOOP ELECTRODE EXCISION N/A 2016    one abnormal after, clear since that time    COLONOSCOPY N/A 8/6/2024    Procedure: COLONOSCOPY;  Surgeon: Crispin Ardon MD;  [facility]  Service: Endoscopy;  Laterality: N/A;    COLPOSCOPY      ESOPHAGOGASTRODUODENOSCOPY N/A 8/6/2024    Procedure: EGD (ESOPHAGOGASTRODUODENOSCOPY);  Surgeon: Crispin Ardon MD;  [facility]  Service: Endoscopy;  Laterality: N/A;  6/14/24- case length adjusted - ERW     Social History     Tobacco Use    Smoking status: Never    Smokeless tobacco: Never    Tobacco comments:     Occasional social smoking rarely   Substance Use Topics    Alcohol use: Yes     Comment: occasionally    Drug use: Never     Review of patient's allergies indicates:  No Known Allergies    Medications: I have reviewed the current medication administration record.    (Not in a hospital admission)      Review of Systems   Constitutional:  Negative for fatigue.   HENT:  Positive for tinnitus.    Eyes:  Positive for pain and visual disturbance.   Respiratory:  Negative for wheezing.    Cardiovascular:  Negative for chest pain.   Gastrointestinal:  Positive for vomiting.   Musculoskeletal:  Positive for arthralgias.   Neurological:  Positive for dizziness, facial asymmetry (subjective, R face heaviness), speech difficulty, light-headedness, numbness and headaches.     Objective:     Vital Signs (Most Recent):  Temp: 99.1 °F (37.3 °C) (12/07/24 1649)  Pulse: 81 (12/07/24 1730)  Resp: 20 (12/07/24 1730)  BP: 127/69 (12/07/24 1730)  SpO2: 100 % (12/07/24 1730)    Vital Signs Range (Last 24H):  Temp:  [98 °F  "(36.7 °C)-99.1 °F (37.3 °C)]   Pulse:  []   Resp:  [16-20]   BP: (127-150)/(69-99)   SpO2:  [96 %-100 %]        Physical Exam  Vitals and nursing note reviewed.   Constitutional:       General: She is not in acute distress.  HENT:      Head: Normocephalic.      Nose: Nose normal.   Eyes:      Extraocular Movements: Extraocular movements intact.      Conjunctiva/sclera: Conjunctivae normal.   Cardiovascular:      Rate and Rhythm: Normal rate.   Pulmonary:      Effort: Pulmonary effort is normal. No respiratory distress.   Skin:     General: Skin is warm.   Neurological:      Mental Status: She is alert.      Comments: See below for neuro exam   Psychiatric:         Attention and Perception: Attention normal.         Mood and Affect: Affect is tearful.         Behavior: Behavior is cooperative.              Neurological Exam:   LOC: alert  Attention Span: Good   Language: No aphasia  Articulation: Intermittent stuttering, no dysarthria  Orientation: Person, Place, Time   Visual Fields: Full  EOM (CN III, IV, VI): Full/intact  Facial Sensation (CN V): Subjective R facial paresthesia  Facial Movement (CN VII): Symmetric facial expression    Motor:   --LUE Normal 5/5  --LLE Normal 5/5  --RUE Normal 5/5  --RLE Normal 5/5  Sensation: Subjective RUE paresthesia        Laboratory:  CMP:   Recent Labs   Lab 12/07/24  1729   CALCIUM 9.8   ALBUMIN 4.4   PROT 8.0      K 3.6   CO2 19*      BUN 8   CREATININE 0.8   ALKPHOS 28*   ALT 15   AST 25   BILITOT 0.2     CBC:   Recent Labs   Lab 12/07/24  1729   WBC 9.38   RBC 4.21   HGB 13.0   HCT 41.9      *   MCH 30.9   MCHC 31.0*     Lipid Panel:   Recent Labs   Lab 12/07/24  1729   CHOL 199   LDLCALC 99.8   HDL 68   TRIG 156*     Coagulation:   Recent Labs   Lab 12/07/24  1729   INR 1.0     Hgb A1C: No results for input(s): "HGBA1C" in the last 168 hours.    TSH: No results for input(s): "TSH" in the last 168 hours.    Diagnostic " Results:      Brain/Vessel Imaging:  CTA stroke multiphase 12/7/2024  Imaging independently reviewed and interpreted, findings discussed with VN fellow Dr. Tello as well as VN staff Dr. Talamantes who are in agreement: CTH negative for evidence of acute ICH or major territory infarct, CTA limited due to poor contrast timing however no definitive LVO appreciated.   Radiology read pending        Linda Jin PA-C  [facility]  Department of Vascular Neurology   [facility] - Emergency Dept

## 2024-12-09 ENCOUNTER — CLINICAL SUPPORT (OUTPATIENT)
Dept: REHABILITATION | Facility: HOSPITAL | Age: 29
End: 2024-12-09
Payer: COMMERCIAL

## 2024-12-09 DIAGNOSIS — R29.898 DECREASED GRIP STRENGTH OF RIGHT HAND: ICD-10-CM

## 2024-12-09 DIAGNOSIS — M25.611 DECREASED RANGE OF MOTION OF RIGHT SHOULDER: Primary | ICD-10-CM

## 2024-12-09 PROCEDURE — 97112 NEUROMUSCULAR REEDUCATION: CPT

## 2024-12-09 PROCEDURE — 97140 MANUAL THERAPY 1/> REGIONS: CPT

## 2024-12-11 ENCOUNTER — HOSPITAL ENCOUNTER (OUTPATIENT)
Dept: RADIOLOGY | Facility: OTHER | Age: 29
Discharge: HOME | End: 2024-12-11
Attending: OBSTETRICS & GYNECOLOGY
Payer: COMMERCIAL

## 2024-12-11 DIAGNOSIS — R10.2 PELVIC PAIN IN FEMALE: ICD-10-CM

## 2024-12-11 DIAGNOSIS — N94.6 DYSMENORRHEA: ICD-10-CM

## 2024-12-11 PROCEDURE — 76830 TRANSVAGINAL US NON-OB: CPT | Mod: 26,,, | Performed by: RADIOLOGY

## 2024-12-11 PROCEDURE — 76856 US EXAM PELVIC COMPLETE: CPT | Mod: TC

## 2024-12-11 PROCEDURE — 76856 US EXAM PELVIC COMPLETE: CPT | Mod: 26,,, | Performed by: RADIOLOGY

## 2024-12-12 ENCOUNTER — LAB VISIT (OUTPATIENT)
Dept: LAB | Facility: OTHER | Age: 29
End: 2024-12-12
Attending: STUDENT IN AN ORGANIZED HEALTH CARE EDUCATION/TRAINING PROGRAM
Payer: COMMERCIAL

## 2024-12-12 ENCOUNTER — OFFICE VISIT (OUTPATIENT)
Dept: NEUROLOGY | Facility: CLINIC | Age: 29
End: 2024-12-12
Payer: COMMERCIAL

## 2024-12-12 VITALS
SYSTOLIC BLOOD PRESSURE: 128 MMHG | HEIGHT: 63 IN | BODY MASS INDEX: 23.1 KG/M2 | DIASTOLIC BLOOD PRESSURE: 83 MMHG | HEART RATE: 67 BPM | WEIGHT: 130.38 LBS

## 2024-12-12 DIAGNOSIS — D35.2 PITUITARY ADENOMA: ICD-10-CM

## 2024-12-12 DIAGNOSIS — N64.3 GALACTORRHEA: ICD-10-CM

## 2024-12-12 DIAGNOSIS — G43.119 INTRACTABLE MIGRAINE WITH AURA WITHOUT STATUS MIGRAINOSUS: Primary | ICD-10-CM

## 2024-12-12 DIAGNOSIS — R29.898 DECREASED GRIP STRENGTH OF RIGHT HAND: ICD-10-CM

## 2024-12-12 DIAGNOSIS — M54.12 CERVICAL RADICULOPATHY AT C6: ICD-10-CM

## 2024-12-12 LAB
PROLACTIN SERPL IA-MCNC: 13.1 NG/ML (ref 5.2–26.5)
TSH SERPL DL<=0.005 MIU/L-ACNC: 2.55 UIU/ML (ref 0.4–4)

## 2024-12-12 PROCEDURE — 99999 PR PBB SHADOW E&M-EST. PATIENT-LVL IV: CPT | Mod: PBBFAC,,, | Performed by: STUDENT IN AN ORGANIZED HEALTH CARE EDUCATION/TRAINING PROGRAM

## 2024-12-12 PROCEDURE — 3079F DIAST BP 80-89 MM HG: CPT | Mod: CPTII,S$GLB,, | Performed by: STUDENT IN AN ORGANIZED HEALTH CARE EDUCATION/TRAINING PROGRAM

## 2024-12-12 PROCEDURE — 84443 ASSAY THYROID STIM HORMONE: CPT | Performed by: STUDENT IN AN ORGANIZED HEALTH CARE EDUCATION/TRAINING PROGRAM

## 2024-12-12 PROCEDURE — 82024 ASSAY OF ACTH: CPT | Performed by: STUDENT IN AN ORGANIZED HEALTH CARE EDUCATION/TRAINING PROGRAM

## 2024-12-12 PROCEDURE — 84305 ASSAY OF SOMATOMEDIN: CPT | Performed by: STUDENT IN AN ORGANIZED HEALTH CARE EDUCATION/TRAINING PROGRAM

## 2024-12-12 PROCEDURE — 99214 OFFICE O/P EST MOD 30 MIN: CPT | Mod: S$GLB,,, | Performed by: STUDENT IN AN ORGANIZED HEALTH CARE EDUCATION/TRAINING PROGRAM

## 2024-12-12 PROCEDURE — 3008F BODY MASS INDEX DOCD: CPT | Mod: CPTII,S$GLB,, | Performed by: STUDENT IN AN ORGANIZED HEALTH CARE EDUCATION/TRAINING PROGRAM

## 2024-12-12 PROCEDURE — 84146 ASSAY OF PROLACTIN: CPT | Performed by: STUDENT IN AN ORGANIZED HEALTH CARE EDUCATION/TRAINING PROGRAM

## 2024-12-12 PROCEDURE — 3074F SYST BP LT 130 MM HG: CPT | Mod: CPTII,S$GLB,, | Performed by: STUDENT IN AN ORGANIZED HEALTH CARE EDUCATION/TRAINING PROGRAM

## 2024-12-12 RX ORDER — BUTALBITAL, ACETAMINOPHEN AND CAFFEINE 50; 325; 40 MG/1; MG/1; MG/1
1 TABLET ORAL EVERY 6 HOURS PRN
Qty: 20 TABLET | Refills: 0 | Status: SHIPPED | OUTPATIENT
Start: 2024-12-12

## 2024-12-12 NOTE — PROGRESS NOTES
Patient ID: [id number]  Referring Physician: Anastasia Sung MD    Chief Complaint/Reason for Consult: Headaches  Subjective:     HPI:  Devora Meeks is a 29 y.o. RH female with anxiety, endometriosis, and insomnia. she is presenting today as a new patient for evaluation of multiple neurological symptoms.     She reports several years of numbness, tingling, and pain that started from the hand (4th and 5th digits) radiating upwards.  She had not pursued medical attention due to lack of insurance at the time.  More recently her  is affected. Occasionally the 4th and 5th digit remain flexed (I.e. ulnar claw hand)  She can not hold a paint brush or carry plates anymore.  She has seen an orthopedic surgeon, MRI of shoulder did not show any rotator cuff or joint pathologies.   She tried meloxicam x1 week with no improvement. She has been doing PT which is only partially helping.    More recently she has had two severe migraines, more recent one brought her to the ER since she was experiencing numbness, heaviness, and tingling on the right side of the face (all 3 CN-III distributions). 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 She had difficulty with her thinking and words, had slurring and what sounds to be paraphasia. Additionally she reports vertigo (sensation of environment and people moving faster than her) and tinnitus in the right ear.    In the ER, she completed CTA stroke which ruled out stenosis, large vessel occlusion, or aneurysms. MRI of cervical spine did not show any cord lesions or disc herniation and was read as unremarkable except mild disc bulge at C6-C7. MRI of brain/pituitary showed a 9 mm adenoma. Upon questioning she reports intermittent breast milky discharge.     Headache history  Age at onset: several years ago  Course over time: stable but new features   Location: right  retro-orbital  Character:  pressure  Intensity: 10 on a scale from 1 to 10 on oct 30th and Dec 7th  Frequency:  x1-2 per month .   Mild/moderate/severe. Work attendance or other daily activities are affected by the headaches.  Aura: not preceded by an aura  Associated symptoms: N/V, Photosensitivity, and Phonophobia   Associated neurologic symptoms: dizziness, loss of balance, speech difficulties, and vision problems  Precipitating factors: None which have been determined  Home treatment: Resting in a dark room and Goodies/Excedrin with some improvement.   ER visits: Yes  Positive Hx of: No Head trauma  Is medication overuse contributing to the patient's current migraine burden: No    Headache Medication history:  AED Neuromodulators  MAOIs  Ergot Alkaloids    Acetazolamide (Diamox) [] Phenelzine (Nardil) [] Dihydroergotamine (Migranal) []   Carbamazepine (Tegretol) [] Tranylcypromine (Parnate) [] Ergotamine (Ergomar) []   Gabapentin (Neurontin) [] Antihistamine/Serotonergic  Triptans    Lacosamide (Vimpat) [] Cyproheptadine (Periactin) [] Almotriptan (Axert) []   Lamotrigine (Lamictal) [] Antihypertensives  Eletriptan (Relpax) []   Levatiracetam (Keppra) [] Atenolol (Tenormin) [] Frovatriptan (Frova) []   Oxcarbazepine (Trileptal) [] Bisoprostol (Zebeta) [] Naratriptan (Amerge) []   Levetiracetam (Keppra) [] Candesartan (Atacand) [] Rizatriptan (Maxalt) []   Pregabalin (Lyrica) [] Carvedilol (Coreg)  Sumatriptan (Imitrex) []   Topiramate (Topamax)  (Trokendi) [] Diltiazem (Cardizem) [] Zolmitriptan (Zomig) []   Valproic Acid (Depakote) (Divalproex Sodium) [] Lisinopril (Prinivil, Zestril) [] Combo Abortives    Zonisamide (Zonegran) [] Metoprolol (Toprol) [] BC Powder    Muscle relaxants  Nadolol (Corgard) [] Butalbital and Acetaminophen (Bupap) []   Methocarbamol (Robaxin) [] Nebivolol (Bystolic) [] Butalbital, Acetaminophen, and caffeine (Fioricet) []   Baclofen (Lioresal) [] Nicardipine (Cardene) []      Cyclobenzaprine (Flexeril) [] Nimodipine (Nimotop) [] Butalbital, Aspirin, and caffeine (Fiorinal) []   Tizanidine (Zanaflex) [] Propranolol (Inderal) []     Benzodiazepines  Telmisartan (Micardis) [] Butalbital, Caffeine, Acetaminophen, and Codeine (Fioricet with Codeine) []   Clonazepam (Klonopin) [] Timolol (Blocadren) []     Alprazolam (Xanax) [] Verapamil (Calan, Verelan) [] Butalbital, Caffeine, Aspirin, and Codeine  (Fiorinal with Codeine) []   Diazepam (Valium) [] NSAIDs      Lorazepam (Ativan) [] Acetaminophen (Tylenol) []     Antidepressants   Acetylsalicylic Acid (Aspirin) [] Aspirin, Caffeine, and Acetaminophen (Excedrin) (Goodys) [x]   Amitriptyline (Elavil) [] Celecoxib (Celebrex) []     Bupropion (Wellbutrin)  Diclofenac (Cambia) []     Citalopram (Celexa) [] Ibuprofen (Motrin, Advil) [] Acetaminophen, Caffeine, Pyrilamine maleate (Midol) []   Desipramine (Norpramin) [] Indomethacin (Indocin) []     Desvenlafazine (Pristiq) [] Ketoprofen (Orudis) [] Acetaminophen, Dichloralphenazone, and Isometheptene (Midrin) []   Doxepin (Sinequan) [] Ketorolac (Toradol) []     Duloxetine (Cymbalta) [] Naproxen (Anaprox, Aleve) []     Escitalopram (Lexapro) [] Meclofenamic Acid (Meclomen) [] Procedures    Fluoxetine (Prozac) [] Meloxicam (Mobic) [] Greater occipital nerve block []   Imipramine (Tofranil) [] Monoclonals  Cervical radiofrequency ablation []   Nortriptyline (Pamelor) [] Erenumab-aooe (Aimovig) [] Spenopalatine ganglion block []   Protriptyline (Vivactil) [] Galcanezumab (Emgality) [] Occipital neuro stimulation []   Trazodone (Desyrel, Oleptro) [] Fremanazumab-vfrm (Ajovy) [] Cervical Epidural steroid injection []   Venlafaxine (Effexor) [] Eptinezumab (Vyepti) [] Facet joint injections []   Oral CGRP inhibitors  Neuromodulation devices   Transforaminal epidural steroid injection []   Atogepant (Qulipta) [] Cefaly [] Cervical medial branch blocks []   Rimegepant (Nurtec) [] Gamma Core [] Botox []    Ubrogepant (Ubrelvy) [] Nerivio [] iovera []   Zavegepant (Zavzpret) [] Transcranial Magnetic stimulation [] Antiemetics    Other    Prochlorperazine (Compazine) []   Memantine (Namenda) []   Metoclopramide (Reglan)  []       Promethazine (Phenergan)  []       Ondansetron (Zofran) []       Meclizine (Antivert, Dramamine) []     Review of Systems:  Review of Systems   HENT:  Positive for tinnitus.    Eyes:  Positive for blurred vision and photophobia. Negative for double vision.   Gastrointestinal:  Positive for nausea. Negative for vomiting.   Musculoskeletal:  Positive for neck pain. Negative for falls.   Neurological:  Positive for dizziness, tingling, sensory change, focal weakness and headaches.   Endo/Heme/Allergies:         Galactorrhea    Psychiatric/Behavioral:  The patient has insomnia.    All other systems reviewed and are negative.       Past Medical History:  -------------------------------------    Abnormal Pap smear of cervix       Allergies:  Review of patient's allergies indicates:  No Known Allergies    Pertinent Family History:  Family History   Problem Relation Name Age of Onset    Melanoma Mother Danya     Basal cell carcinoma Mother Danya     Cancer Mother Danya     Hypertension Mother Danya     Miscarriages / Stillbirths Mother Danya     Skin cancer Brother  30    Heart attack Maternal Grandfather  60    Colon cancer Maternal Uncle  45    Arthritis Maternal Grandmother M     Heart disease Maternal Grandmother M    Brother has migraines    Pertinent Social History:  Social History     Tobacco Use    Smoking status: Never    Smokeless tobacco: Never    Tobacco comments:     Occasional social smoking rarely   Substance Use Topics    Alcohol use: Yes     Comment: occasionally    Drug use: Never       Medications:  Current Outpatient Medications   Medication Instructions    busPIRone (BUSPAR) 7.5 mg, 2 times daily    drospirenone-ethinyl estradioL (NBA) 3-0.02 mg per tablet 1 tablet, Oral, Daily,  Patient to skip placebo and take continuously    HYDROcodone-acetaminophen (NORCO) 5-325 mg per tablet 1 tablet, Oral, Every 6 hours PRN    ibuprofen (ADVIL,MOTRIN) 600 mg, Oral, Every 6 hours PRN    meloxicam (MOBIC) 15 mg, Oral, Daily    methylphenidate HCl (RITALIN) 5 mg, 2 times daily    traZODone (DESYREL)  mg, Nightly PRN        Objective:     Vitals:    12/12/24 1445   BP: 128/83   Pulse: 67        General:  Well-appearing, well-nourished, NAD, cooperative  MSK: TTP on occipital, cervical paraspinal muscles and traps, palpable trigger points in traps (L>R)    Neurologic Exam:   Awake, alert and oriented x3  Speech spontaneous and fluent, intact comprehension.   Adequate fund of knowledge, vocabulary.    CN II - CN XII:  PERRLA. Can not tolerate EOM exam due to provoked dizziness. No obvious ophthalmoplegia.   Facial sensation is normal to light touch.   Facial expression is full and symmetric.   Hearing is intact bilaterally.   Palate elevates symmetrically.   SCM and Trapezius full strength bilaterally.   Tongue is midline.     Motor:  Normal bulk and tone in all four limbs.   There are no atrophy or fasciculations. No tremor.     Shoulder  Abd Shoulder Add Elbow   Flex Elbow  Ext Wrist   Flex Wrist  Ext Finger  Flex Finger  Ext Finger  Abd Finger   Add IO Opposition   Right 5 5 5 5       5    Left 5 5 5 5       5       Hip  Flex Hip  Ext Thigh   Abd Thigh  Add Knee  Flex Knee  Ext Plantar  Flex Dorsiflex   Right 5 5   5 5 5 5   Left 5 5   5 5 5 5     Sensory:  Light touch: reduced tactile sense on RUE (ulnar aspect of hand and 5th digit)  Proprioception: proprioceptive sense present on RUE and on LUE  Romberg is Exam: negative    DTRs:   Biceps Brachioradialis Triceps Humza Patellar Ankle Plantar   Right 2+ (brisk) 2+  - 2+     Left 2+ (brisk)  2+  - 2+       Coordination:  Finger to nose is normal bilaterally.  Normal fine finger movements and rapid alternating movements.    Gait:  Normal casual  and tandem gait.    Pertinent lab results  Lab Results   Component Value Date    COMPLEMENTC4 24 08/22/2024    ANTIGLIADINA <0.2 05/27/2024    ANTIGLIADIN <0.6 05/27/2024    TTGIGA <0.2 05/27/2024    TTGIGG <0.6 05/27/2024    CELACIMMUNA 139 05/27/2024     Lab Results   Component Value Date    NBB53QZQU Negative 05/23/2024    HEPAIGG Non-reactive 05/27/2024    HEPBSAG Non-reactive 05/27/2024     Lab Results   Component Value Date    TSH 1.829 12/07/2024    WBC 9.38 12/07/2024    LYMPH 2.8 12/07/2024    LYMPH 29.4 12/07/2024    RBC 4.21 12/07/2024    HGB 13.0 12/07/2024    HCT 41.9 12/07/2024     (H) 12/07/2024     12/07/2024     12/07/2024    K 3.6 12/07/2024    CO2 19 (L) 12/07/2024    BUN 8 12/07/2024    CREATININE 0.8 12/07/2024    CALCIUM 9.8 12/07/2024    AST 25 12/07/2024    ALT 15 12/07/2024       Pertinent imaging results    *Images personally reviewed and interpreted:  12/7/2024  MRI Brain/pituitary w/wo contrast:  9 mm pituitary adenoma  No other intraparenchymal lesions     CTA Head and Neck:  No LVO, high grade stenosis or aneurysm    10/23/2024  MRA Neck wo contrast:  Unremarkable     MRI Cervical Spine w/wo contrast:  Mild disc bulge at C6-C7 without canal or foraminal stenosis    Other pertinent studies  None    Assessment:   Devora Meeks is a 29 y.o. RH female with anxiety, endometriosis, and insomnia who presents with multiple neurological symptoms including headaches and sensory disturbances. Features are suggestive of  migraines with aura. she is having <6 headache days per month therefore a daily preventative treatment is not warranted. For rescue, we will do a trial of Fioricet. For galactorrhea in the setting of pituitary adenoma we will check serum panel for pituitary hormones. I reviewed her images with neuroradiology, since there was no concern about nerve roots we will consider EMG-NCS.    1. Intractable migraine with aura without status migrainosus    2.  Pituitary adenoma    3. Cervical radiculopathy at C6    4. Decreased  strength of right hand    5. Galactorrhea      Plan:     - PROLACTIN; Future  - ACTH; Future  - Insulin-like growth factor; Future  - TSH; Future  - FSH and LH; Future  - butalbital-acetaminophen-caffeine -40 mg (FIORICET, ESGIC) -40 mg per tablet; Take 1 tablet by mouth every 6 (six) hours as needed for Headaches. Nto to betaken more than 2 days out of the week.  Dispense: 20 tablet; Refill: 0  - vestibular PT once shoulder PT is completed         Disclaimer: This note was partly generated using dictation software which may occasionally result in transcription errors that are missed on review.      Based on our encounter today, my overall Medical Decision Making is a Level 4     Complexity of Problem: Moderate (1 undiagnosed new problem with uncertain prognosis)  Complexity of Data: Extensive (Review of >3 unique test results, Ordering >3 unique tests , and Independent interpretation of tests)  Risk of Complications and/or morbidity/mortality of Management: Moderate risk (Prescription drug management)        Kalpana Muñoz MD    [facility]  12/12/2024

## 2024-12-16 ENCOUNTER — CLINICAL SUPPORT (OUTPATIENT)
Dept: REHABILITATION | Facility: HOSPITAL | Age: 29
End: 2024-12-16
Payer: COMMERCIAL

## 2024-12-16 ENCOUNTER — OFFICE VISIT (OUTPATIENT)
Dept: OBSTETRICS AND GYNECOLOGY | Facility: CLINIC | Age: 29
End: 2024-12-16
Attending: OBSTETRICS & GYNECOLOGY
Payer: COMMERCIAL

## 2024-12-16 VITALS
SYSTOLIC BLOOD PRESSURE: 112 MMHG | WEIGHT: 118.69 LBS | DIASTOLIC BLOOD PRESSURE: 74 MMHG | BODY MASS INDEX: 21.03 KG/M2

## 2024-12-16 DIAGNOSIS — R10.2 PELVIC PAIN IN FEMALE: ICD-10-CM

## 2024-12-16 DIAGNOSIS — M25.611 DECREASED RANGE OF MOTION OF RIGHT SHOULDER: Primary | ICD-10-CM

## 2024-12-16 DIAGNOSIS — R29.898 DECREASED GRIP STRENGTH OF RIGHT HAND: ICD-10-CM

## 2024-12-16 DIAGNOSIS — R10.32 LEFT LOWER QUADRANT ABDOMINAL PAIN: ICD-10-CM

## 2024-12-16 DIAGNOSIS — N92.1 MENORRHAGIA WITH IRREGULAR CYCLE: Primary | ICD-10-CM

## 2024-12-16 DIAGNOSIS — N94.6 DYSMENORRHEA: ICD-10-CM

## 2024-12-16 PROCEDURE — 3078F DIAST BP <80 MM HG: CPT | Mod: CPTII,S$GLB,, | Performed by: OBSTETRICS & GYNECOLOGY

## 2024-12-16 PROCEDURE — 3008F BODY MASS INDEX DOCD: CPT | Mod: CPTII,S$GLB,, | Performed by: OBSTETRICS & GYNECOLOGY

## 2024-12-16 PROCEDURE — 1159F MED LIST DOCD IN RCRD: CPT | Mod: CPTII,S$GLB,, | Performed by: OBSTETRICS & GYNECOLOGY

## 2024-12-16 PROCEDURE — 99999 PR PBB SHADOW E&M-EST. PATIENT-LVL III: CPT | Mod: PBBFAC,,, | Performed by: OBSTETRICS & GYNECOLOGY

## 2024-12-16 PROCEDURE — 99213 OFFICE O/P EST LOW 20 MIN: CPT | Mod: S$GLB,,, | Performed by: OBSTETRICS & GYNECOLOGY

## 2024-12-16 PROCEDURE — 97140 MANUAL THERAPY 1/> REGIONS: CPT

## 2024-12-16 PROCEDURE — 3044F HG A1C LEVEL LT 7.0%: CPT | Mod: CPTII,S$GLB,, | Performed by: OBSTETRICS & GYNECOLOGY

## 2024-12-16 PROCEDURE — 3074F SYST BP LT 130 MM HG: CPT | Mod: CPTII,S$GLB,, | Performed by: OBSTETRICS & GYNECOLOGY

## 2024-12-16 PROCEDURE — 97112 NEUROMUSCULAR REEDUCATION: CPT

## 2024-12-16 RX ORDER — MEDROXYPROGESTERONE ACETATE 10 MG/1
10 TABLET ORAL 2 TIMES DAILY
Qty: 10 TABLET | Refills: 0 | Status: SHIPPED | OUTPATIENT
Start: 2024-12-16 | End: 2024-12-21

## 2024-12-16 RX ORDER — NORETHINDRONE 0.35 MG/1
1 TABLET ORAL DAILY
Qty: 90 TABLET | Refills: 3 | Status: SHIPPED | OUTPATIENT
Start: 2024-12-16 | End: 2025-12-16

## 2024-12-16 RX ORDER — HYDROCODONE BITARTRATE AND ACETAMINOPHEN 5; 325 MG/1; MG/1
1 TABLET ORAL EVERY 6 HOURS PRN
Qty: 10 TABLET | Refills: 0 | Status: SHIPPED | OUTPATIENT
Start: 2024-12-16

## 2024-12-16 RX ORDER — NORETHINDRONE 0.35 MG/1
1 TABLET ORAL DAILY
Qty: 30 TABLET | Refills: 11 | Status: SHIPPED | OUTPATIENT
Start: 2024-12-16 | End: 2024-12-16

## 2024-12-16 NOTE — PROGRESS NOTES
[facility]   Physical Therapy Treatment Note      Name: Devora Agustin [facility]  Clinic Number: [id number]    Therapy Diagnosis:   Encounter Diagnoses   Name Primary?    Decreased range of motion of right shoulder Yes    Decreased  strength of right hand        Physician: Anastasia Sung MD    Visit Date: 12/16/2024    Physician Orders: PT Eval and Treat   Medical Diagnosis from Referral:   M25.511,G89.29 (ICD-10-CM) - Chronic right shoulder pain   M75.41 (ICD-10-CM) - Shoulder impingement syndrome, right   Evaluation Date: 9/30/2024  Authorization Period Expiration: 12/31/2024  Plan of Care Expiration: 02/13/2025  Progress Note Due: 1/5/2025  Visit # / Visits authorized: 15/20  FOTO: 2/3     Precautions: Standard,         Precautions: Standard,     PTA Visit #: 0/5     Time In: 1:30 pm    Time Out: 2:25 pm  Total Billable Time: 55 minutes    Subjective     Pt reports: She woke up in the middle of the night with pain. She went to the neurologist last week and reports the neurologist told her she has a cyst on T6-7. She also put her on a new migraine medication which has been helping a lot     She was compliant with home exercise program.  Response to previous treatment: decreased pain   Functional change: able to lift arm easier    Pain: 6/10  Location: right hands      Objective      Objective tests will be taken at progress report unless otherwise noted.     Treatment     Devora received the treatments listed below:      therapeutic exercises to develop strength, endurance, ROM, flexibility, posture, and core stabilization for 0 minutes including:    manual therapy techniques: Joint mobilizations, Manual traction, Myofacial release, Manual Lymphatic Drainage, Soft tissue Mobilization, and Friction Massage were applied to the: R shoulder for 8 minutes, including:  Prone thoracic manipulation (pt agreeable)  Thoracic gapping, grades I-III prone  CT junction AP glides seated with  "passive CT extension     Not today:  First rib assessment   First Rib mobilizations   Shoulder joint assessment   First rib mobilization grades III-IV  Shoulder oscillations     neuromuscular re-education activities to improve: Balance, Coordination, Kinesthetic, Sense, Proprioception, and Posture for 47 minutes. The following activities were included:  Ulnar nerve glides 20x  Scap squeezes with arm supported on pillow 10x   Wall slides with shoulder shrugs (arms tucked in closely) 2x10   DNF 2 x 10 10" hold  Taping for scapular elevation and humeral head posterior glide to relieve neurovascular structures   Open books 20x3" holds   Hand heel rocks w/ chin tuck 20x   Seated thoracic extension over foam roll 20x   Serratus press quadruped 2x15   Prone IR 3x10   Shoulder extension OTB 3x10   Land mine press 2x15    NT  IR walkouts YTB 2 x 10 3"  Prone scap set 20x 3"  Shoulder shrugs with arm supported on pillow 10x   Chin tucks 20x 5" holds   Low trap isometric activation 20x 2" holds   IR/ER walk out 15x each OTB         therapeutic activities to improve functional performance for 0  minutes, including:        Patient Education and Home Exercises       Education provided:   - new HEP  - goals of taping  - pain relief techniques  - slow progression of sessions     Written Home Exercises Provided: YES. Exercises were reviewed and Devora was able to demonstrate them prior to the end of the session.  Devora demonstrated good understanding of the education provided. See EMR under Patient Instructions for exercises provided during therapy sessions    Assessment     Progressed with strengthening of subscapularis to correct anterior glide of humeral head. Also progressed with serratus anterior strengthening which was tolerated with no increased pain. Will continue to progress into more functional movement patterns.     Devora Is progressing well towards her goals.   Pt prognosis is Good.     Pt will continue to benefit " from skilled outpatient physical therapy to address the deficits listed in the problem list box on initial evaluation, provide pt/family education and to maximize pt's level of independence in the home and community environment.     Pt's spiritual, cultural and educational needs considered and pt agreeable to plan of care and goals.     Anticipated barriers to physical therapy: work    GOALS:   Short Term Goals:  4 weeks  1.Report decreased R shoulder and arm pain < / =  5/10  to increase tolerance for ADLs  2. Increase PROM by 10 degrees   3. Increased strength by 1/3 MMT grade in R shoulder and periscapular area to increase tolerance for ADL and work activities.  4. Pt to tolerate HEP to improve ROM and independence with ADL's     Long Term Goals: 8 weeks  1.Report decreased R shoulder and arm pain  < / =  2 /10  to increase tolerance for painting and work  2.Increase AROM to WNL  3.Increase strength to >/= 4/5 in R shoulder and periscapular area to increase tolerance for ADL and work activities.  4. 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 Pt will have improved gcode of CJ (20-40% limited) on FOTO shoulder in order to demonstrate true functional improvement.     Plan     Plan of care Certification: 12/5/2024 to 02/13/25.     Continue JOSESITO.     Robert Evansulthojonathan PT, DPT

## 2024-12-16 NOTE — PROGRESS NOTES
CC:heavy, irregular bleeding.    HPI:started Megan in May for heavy bleeding and painful periods/ pelvic pain.  Initially pain was improved to some degree, now having pain 4-5 times a week, severe, causing disruption to sleep, pain throughout day, doubling over, etc...  Currently in third week of pills- takes continuously.   Bleeding has been clotting since October.   Recent migraine episode with stroke like symptoms, diagnosed with pituitary mass, not prolactinoma.  Has seen Neuro.     ROS:  GENERAL: Feeling well overall. Denies fever or chills.   SKIN: Denies rash or lesions.   HEAD: see HPI  NODES: Denies enlarged lymph nodes.   CHEST: Denies chest pain or shortness of breath.   CARDIOVASCULAR: Denies palpitations or left sided chest pain.   ABDOMEN: No abdominal pain, constipation, diarrhea, nausea, vomiting or rectal bleeding.   URINARY: No dysuria, hematuria, or burning on urination.  REPRODUCTIVE: See HPI.   BREASTS: Denies pain, lumps, or nipple discharge.   HEMATOLOGIC:see HPI  MUSCULOSKELETAL: Denies joint pain or swelling.   NEUROLOGIC: see HPI, continues to have eye pain, headaches.  PSYCHIATRIC: Denies depression, anxiety or mood swings.    PE: /74   Wt 53.8 kg (118 lb 11.2 oz)   LMP  (LMP Unknown)   BMI 21.03 kg/m²     APPEARANCE: Well nourished, well developed, in no acute distress.  EXAMINATION:  US PELVIS COMP WITH TRANSVAG NON-OB (XPD)     CLINICAL HISTORY:  Pelvic and perineal pain     TECHNIQUE:  Transabdominal and transvaginal pelvic ultrasound.     COMPARISON:  11/08/2023     FINDINGS:  Uterus: Measures 6.5 x 3.0 x 4.2 cm.  Endometrial echo complex measures 3 mm, unremarkable.  Note made of subcentimeter subserosal leiomyoma.     Right ovary: Measures 2.6 x 1.2 x 2.0 cm.  Appearance is unremarkable.  Blood flow is present.     Left ovary: Measures 2.3 x 1.4 x 1.3 cm.  Appearance is unremarkable.  Blood flow is present.     Miscellaneous: No free fluid.     Impression:     Small uterine  leiomyoma, similar to prior study.  Otherwise unremarkable examination.        Electronically signed by:Phil Phillip MD  Date:                                            12/11/2024  Time:                                           19:58      Diagnosis:  1. Menorrhagia with irregular cycle    2. Pelvic pain in female    3. Dysmenorrhea    4. Left lower quadrant abdominal pain        Plan:     Orders Placed This Encounter    Ferritin    Iron and TIBC    VITAMIN B12    Vitamin D    VON WILLEBRAND FACTOR ACTIVITY, PLASMA    medroxyPROGESTERone (PROVERA) 10 MG tablet    norethindrone (MICRONOR) 0.35 mg tablet    HYDROcodone-acetaminophen (NORCO) 5-325 mg per tablet         Lyla Barrientos, DO

## 2024-12-17 ENCOUNTER — PATIENT MESSAGE (OUTPATIENT)
Dept: NEUROLOGY | Facility: CLINIC | Age: 29
End: 2024-12-17
Payer: COMMERCIAL

## 2024-12-17 LAB
ACTH PLAS-MCNC: <5 PG/ML (ref 0–46)
IGF-I SERPL-MCNC: 95 NG/ML (ref 59–279)
IGF-I Z-SCORE SERPL: -1.04 SD

## 2024-12-18 ENCOUNTER — NURSE TRIAGE (OUTPATIENT)
Dept: ADMINISTRATIVE | Facility: CLINIC | Age: 29
End: 2024-12-18
Payer: COMMERCIAL

## 2024-12-18 ENCOUNTER — OCHSNER VIRTUAL EMERGENCY DEPARTMENT (OUTPATIENT)
Facility: CLINIC | Age: 29
End: 2024-12-18
Payer: COMMERCIAL

## 2024-12-18 ENCOUNTER — LAB VISIT (OUTPATIENT)
Dept: LAB | Facility: HOSPITAL | Age: 29
End: 2024-12-18
Attending: OBSTETRICS & GYNECOLOGY
Payer: COMMERCIAL

## 2024-12-18 ENCOUNTER — OFFICE VISIT (OUTPATIENT)
Dept: INTERNAL MEDICINE | Facility: CLINIC | Age: 29
End: 2024-12-18
Payer: COMMERCIAL

## 2024-12-18 VITALS
BODY MASS INDEX: 22.54 KG/M2 | SYSTOLIC BLOOD PRESSURE: 130 MMHG | OXYGEN SATURATION: 99 % | HEIGHT: 63 IN | HEART RATE: 60 BPM | WEIGHT: 127.19 LBS | DIASTOLIC BLOOD PRESSURE: 72 MMHG

## 2024-12-18 DIAGNOSIS — M75.41 SHOULDER IMPINGEMENT SYNDROME, RIGHT: Primary | ICD-10-CM

## 2024-12-18 DIAGNOSIS — N92.1 MENORRHAGIA WITH IRREGULAR CYCLE: ICD-10-CM

## 2024-12-18 LAB
FERRITIN SERPL-MCNC: 50 NG/ML (ref 20–300)
IRON SERPL-MCNC: 85 UG/DL (ref 30–160)
SATURATED IRON: 17 % (ref 20–50)
TOTAL IRON BINDING CAPACITY: 493 UG/DL (ref 250–450)
TRANSFERRIN SERPL-MCNC: 333 MG/DL (ref 200–375)

## 2024-12-18 PROCEDURE — 3078F DIAST BP <80 MM HG: CPT | Mod: CPTII,S$GLB,, | Performed by: NURSE PRACTITIONER

## 2024-12-18 PROCEDURE — 3075F SYST BP GE 130 - 139MM HG: CPT | Mod: CPTII,S$GLB,, | Performed by: NURSE PRACTITIONER

## 2024-12-18 PROCEDURE — 82607 VITAMIN B-12: CPT | Performed by: OBSTETRICS & GYNECOLOGY

## 2024-12-18 PROCEDURE — 1160F RVW MEDS BY RX/DR IN RCRD: CPT | Mod: CPTII,S$GLB,, | Performed by: NURSE PRACTITIONER

## 2024-12-18 PROCEDURE — 82306 VITAMIN D 25 HYDROXY: CPT | Performed by: OBSTETRICS & GYNECOLOGY

## 2024-12-18 PROCEDURE — 36415 COLL VENOUS BLD VENIPUNCTURE: CPT | Performed by: OBSTETRICS & GYNECOLOGY

## 2024-12-18 PROCEDURE — 99214 OFFICE O/P EST MOD 30 MIN: CPT | Mod: S$GLB,,, | Performed by: NURSE PRACTITIONER

## 2024-12-18 PROCEDURE — 1159F MED LIST DOCD IN RCRD: CPT | Mod: CPTII,S$GLB,, | Performed by: NURSE PRACTITIONER

## 2024-12-18 PROCEDURE — 99999 PR PBB SHADOW E&M-EST. PATIENT-LVL IV: CPT | Mod: PBBFAC,,, | Performed by: NURSE PRACTITIONER

## 2024-12-18 PROCEDURE — 3044F HG A1C LEVEL LT 7.0%: CPT | Mod: CPTII,S$GLB,, | Performed by: NURSE PRACTITIONER

## 2024-12-18 PROCEDURE — 85397 CLOTTING FUNCT ACTIVITY: CPT | Performed by: OBSTETRICS & GYNECOLOGY

## 2024-12-18 PROCEDURE — 3008F BODY MASS INDEX DOCD: CPT | Mod: CPTII,S$GLB,, | Performed by: NURSE PRACTITIONER

## 2024-12-18 PROCEDURE — 83540 ASSAY OF IRON: CPT | Performed by: OBSTETRICS & GYNECOLOGY

## 2024-12-18 PROCEDURE — 82728 ASSAY OF FERRITIN: CPT | Performed by: OBSTETRICS & GYNECOLOGY

## 2024-12-18 RX ORDER — GABAPENTIN 300 MG/1
300 CAPSULE ORAL 2 TIMES DAILY
Qty: 180 CAPSULE | Refills: 3 | Status: SHIPPED | OUTPATIENT
Start: 2024-12-18

## 2024-12-18 NOTE — TELEPHONE ENCOUNTER
[place]    PCP:  Dr. Anastasia Sung    C/O numbness/tingling/pain on RUE rated 8/10.  Denies SOB, CP, fever, rash, neck pain, swelling, rash, and injury.  She reports that she was seen in the ED and by Neuro for this problem already.  She has taken Tylenol.  She has tried ice/heat and is currently doing PT.  Per protocol, care advised is go to ED/U[facility] now (or to office with PCP approval).  Pt referred to [facility] Provider (Santino Kimble MD).  [facility] Provider recommends:  F/U with PCP and to assure pt that she is not having a stroke or symptoms r/t her pituitary adenoma causing her arm pain d/t extensive work-up which was - with the exception of disc bulge at C6/7.  Appt scheduled with PCP for today at 3p per Aleksandra MOSS.  Pt notified of appt.  Pt VU.  Advised to call for worsening/questions/concerns.  VU.    Reason for Disposition   Patient sounds very sick or weak to the triager    Additional Information   Negative: Shock suspected (e.g., cold/pale/clammy skin, too weak to stand, low BP, rapid pulse)   Negative: Similar pain previously and it was from 'heart attack'   Negative: Similar pain previously from 'angina' and not relieved by nitroglycerin   Negative: Sounds like a life-threatening emergency to the triager   Negative: Difficulty breathing or unusual sweating (e.g., sweating without exertion)   Negative: Chest pain lasting longer than 5 minutes   Negative: Age > 40 and no obvious cause for pain, pain still present even when not moving the arm   Negative: Fever and red area (or area very tender to touch)   Negative: Swollen joint and fever   Negative: Entire arm is swollen    Protocols used: Arm Pain-A-[place]

## 2024-12-18 NOTE — PROGRESS NOTES
"Subjective     Patient ID: Devora Meeks is a 29 y.o. female.  /72 (BP Location: Left arm, Patient Position: Sitting)   Pulse 60   Ht 5' 3" (1.6 m)   Wt 57.7 kg (127 lb 3.3 oz)   LMP  (LMP Unknown)   SpO2 99%   BMI 22.53 kg/m²        History of Present Illness    CHIEF COMPLAINT:  Devora presents today with pain and numbness in the right arm and shoulder.    NEUROLOGICAL AND MUSCULOSKELETAL SYMPTOMS:  She reports numbness and tingling in the face along with decreased  strength in the right hand. She experiences shooting pains and an internal tearing sensation in the shoulder, and requires repositioning every morning, causing shooting pain. She reports frequently dropping objects due to hand pain. These symptoms have persisted for years with peak intensity in the past three months.    DIAGNOSTIC FINDINGS:  MRI showed modest cervical spondylosis without significant spinal canal stenosis, with an area of concern between C6 and C7 and possible lesion noted by neurologist. Orthopedic surgeon suggested a potential brachial plexus injury.     TREATMENT:  She is currently attending physical therapy for her arm but finds it challenging.    MEDICATIONS:  She takes Hydrocodone for menstrual cramps, which is ineffective for her current pain. She has discontinued Trazodone.      ROS:  Musculoskeletal: +joint pain, +muscle pain, -muscle cramps  Neurological: +numbness, +tingling         Patient Active Problem List   Diagnosis    Chronic right shoulder pain    Shoulder impingement syndrome, right    Decreased range of motion of right shoulder    Decreased  strength of right hand    Numbness and tingling of right side of face        Current Outpatient Medications   Medication Sig Dispense Refill    busPIRone (BUSPAR) 15 MG tablet Take 7.5 mg by mouth 2 (two) times daily.      butalbital-acetaminophen-caffeine -40 mg (FIORICET, ESGIC) -40 mg per tablet Take 1 tablet by mouth every 6 (six) " hours as needed for Headaches. Nto to betaken more than 2 days out of the week. 20 tablet 0    HYDROcodone-acetaminophen (NORCO) 5-325 mg per tablet Take 1 tablet by mouth every 6 (six) hours as needed for Pain. 10 tablet 0    medroxyPROGESTERone (PROVERA) 10 MG tablet Take 1 tablet (10 mg total) by mouth 2 (two) times a day. for 5 days 10 tablet 0    methylphenidate HCl (RITALIN) 5 MG tablet Take 5 mg by mouth 2 (two) times daily.      norethindrone (MICRONOR) 0.35 mg tablet Take 1 tablet (0.35 mg total) by mouth once daily. 90 tablet 3    gabapentin (NEURONTIN) 300 MG capsule Take 1 capsule (300 mg total) by mouth 2 (two) times daily. 180 capsule 3     No current facility-administered medications for this visit.        Review of Systems       Objective     Physical Exam       Assessment and Plan     1. Shoulder impingement syndrome, right  -     gabapentin (NEURONTIN) 300 MG capsule; Take 1 capsule (300 mg total) by mouth 2 (two) times daily.  Dispense: 180 capsule; Refill: 3        Assessment & Plan    IMPRESSION:  - Reviewed recent MRI showing modest cervical spondylosis without significant spinal canal stenosis  - Noted neurologist's concern about possible lesion between C6-C7 vertebrae, pending further review from neurologist   - Assessed pain as likely neuropathic in nature  - Will initiate gabapentin for neuropathic pain management    CERVICOBRACHIAL SYNDROME AND CERVICAL RADICULOPATHY:  - Discussed how gabapentin works to help with nerve pain.  - Informed patient that complete pain resolution is unlikely at this time, but medication should help improve symptoms.  - Started gabapentin 300mg at bedtime for a few days.  - If tolerated well, increase to 300mg twice daily (morning and evening).  - Provided 90-day prescription for 180 pills with 3 refills.    PAIN MANAGEMENT (RIGHT SHOULDER, ARM, AND WRIST):  - Devora to continue physical therapy and start gabapentin     FOLLOW-UP:  - Follow up in a few weeks if  symptoms are not improving after gabapentin use.  - Send follow-up message to neurologist requesting callback about MRI findings and potential next steps.   - May consider nightly Cymbalta in future for further pain management           Jameel Juarez NP   Internal Medicine           Follow up if symptoms worsen or fail to improve.    This note was generated with the assistance of ambient listening technology. Verbal consent was obtained by the patient and accompanying visitor(s) for the recording of patient appointment to facilitate this note. I attest to having reviewed and edited the generated note for accuracy, though some syntax or spelling errors may persist. Please contact the author of this note for any clarification.    I spent a total of 30 minutes on the day of the visit.This includes face to face time and non-face to face time preparing to see the patient (eg, review of tests), obtaining and/or reviewing separately obtained history, documenting clinical information in the electronic or other health record, independently interpreting results and communicating results to the patient/family/caregiver, or care coordinator.

## 2024-12-18 NOTE — PLAN OF CARE-OVED
[facility] Plan of Care Note    Referral source: Nurse On-Call      Reason for consult: Numbness      Additional History:  Patient with history of cervical radiculopathy already worked up in ED and seen by neurology.  Negative CTA head and MRI brain showing pituitary adenoma has right upper extremity pain and paresthesias.    Given extensive workup and recent follow up with Neurology I think the patient is okay to follow up with PCP for pain management.  Offered to prescribe anti-inflammatories but patient will go to PCP today      Disposition recommended: Primary Care      Additional Recommendations: no

## 2024-12-19 ENCOUNTER — CLINICAL SUPPORT (OUTPATIENT)
Dept: REHABILITATION | Facility: HOSPITAL | Age: 29
End: 2024-12-19
Payer: COMMERCIAL

## 2024-12-19 DIAGNOSIS — R29.898 DECREASED GRIP STRENGTH OF RIGHT HAND: ICD-10-CM

## 2024-12-19 DIAGNOSIS — M25.611 DECREASED RANGE OF MOTION OF RIGHT SHOULDER: Primary | ICD-10-CM

## 2024-12-19 LAB
25(OH)D3+25(OH)D2 SERPL-MCNC: 47 NG/ML (ref 30–96)
VIT B12 SERPL-MCNC: 424 PG/ML (ref 210–950)

## 2024-12-19 PROCEDURE — 97112 NEUROMUSCULAR REEDUCATION: CPT

## 2024-12-19 PROCEDURE — 97140 MANUAL THERAPY 1/> REGIONS: CPT

## 2024-12-19 NOTE — PROGRESS NOTES
[facility]   Physical Therapy Treatment Note      Name: Devora Isaacs  Clinic Number: [id number]    Therapy Diagnosis:   Encounter Diagnoses   Name Primary?    Decreased range of motion of right shoulder Yes    Decreased  strength of right hand        Physician: Anastasia Sung MD    Visit Date: 12/19/2024    Physician Orders: PT Eval and Treat   Medical Diagnosis from Referral:   M25.511,G89.29 (ICD-10-CM) - Chronic right shoulder pain   M75.41 (ICD-10-CM) - Shoulder impingement syndrome, right   Evaluation Date: 9/30/2024  Authorization Period Expiration: 12/31/2024  Plan of Care Expiration: 02/13/2025  Progress Note Due: 1/5/2025  Visit # / Visits authorized: 16/20  FOTO: 2/3     Precautions: Standard,      PTA Visit #: 0/5     Time In: 5:00 pm    Time Out: 5:46 pm  Total Billable Time: 46 minutes    Subjective     Pt reports: She still feels bad and her arm and hand have been killing her this week   She was compliant with home exercise program.  Response to previous treatment: decreased pain   Functional change: able to lift arm easier    Pain: 6/10  Location: right hands      Objective      Objective tests will be taken at progress report unless otherwise noted.     Treatment     Devora received the treatments listed below:      therapeutic exercises to develop strength, endurance, ROM, flexibility, posture, and core stabilization for 0 minutes including:    manual therapy techniques: Joint mobilizations, Manual traction, Myofacial release, Manual Lymphatic Drainage, Soft tissue Mobilization, and Friction Massage were applied to the: R shoulder for 8 minutes, including:  Prone thoracic manipulation (pt agreeable)  Thoracic gapping, grades I-III prone  CT junction AP glides seated with passive CT extension     Not today:  First rib assessment   First Rib mobilizations   Shoulder joint assessment   First rib mobilization grades III-IV  Shoulder oscillations  "    neuromuscular re-education activities to improve: Balance, Coordination, Kinesthetic, Sense, Proprioception, and Posture for 38 minutes. The following activities were included:  Ulnar nerve glides 25x  Scap squeezes with arm supported on pillow 20x   Wall slides with shoulder shrugs (arms tucked in closely) 2x10   DNF 2 x 10 10" hold  Taping for scapular elevation and humeral head posterior glide to relieve neurovascular structures   Open books 20x3" holds   Hand heel rocks w/ chin tuck 20x   Serratus press quadruped 2x15   Prone IR 3x10   Shoulder extension OTB 3x10   Land mine press 2x15  Standing thoracic rotation 15x ea side     NT  Seated thoracic extension over foam roll 20x   IR walkouts YTB 2 x 10 3"  Prone scap set 20x 3"  Shoulder shrugs with arm supported on pillow 10x   Chin tucks 20x 5" holds   Low trap isometric activation 20x 2" holds   IR/ER walk out 15x each OTB         therapeutic activities to improve functional performance for 0  minutes, including:        Patient Education and Home Exercises       Education provided:   - new HEP  - goals of taping  - pain relief techniques  - slow progression of sessions     Written Home Exercises Provided: YES. Exercises were reviewed and Devora was able to demonstrate them prior to the end of the session.  Devora demonstrated good understanding of the education provided. See EMR under Patient Instructions for exercises provided during therapy sessions    Assessment     Progressed with thoracic mobility exercises today to help decrease tension on her nervous system. Pt continues to have extreme pain in her arms especially with lifting objects. Continue to strengthen her scapula muscles and DNF to offload shoulder and promote posterior shoulder glide with subscapularis training. Attempted chin tucks with OH flexion today, but pt reported pain. Will continue to progress into more functional movement patterns.     Devora Is progressing well towards her " goals.   Pt prognosis is Good.     Pt will continue to benefit from skilled outpatient physical therapy to address the deficits listed in the problem list box on initial evaluation, provide pt/family education and to maximize pt's level of independence in the home and community environment.     Pt's spiritual, cultural and educational needs considered and pt agreeable to plan of care and goals.     Anticipated barriers to physical therapy: work    GOALS:   Short Term Goals:  4 weeks  1.Report decreased R shoulder and arm pain < / =  5/10  to increase tolerance for ADLs  2. Increase PROM by 10 degrees   3. Increased strength by 1/3 MMT grade in R shoulder and periscapular area to increase tolerance for ADL and work activities.  4. Pt to tolerate HEP to improve ROM and independence with ADL's     Long Term Goals: 8 weeks  1.Report decreased R shoulder and arm pain  < / =  2 /10  to increase tolerance for painting and work  2.Increase AROM to WNL  3.Increase strength to >/= 4/5 in R shoulder and periscapular area to increase tolerance for ADL and work activities.  4. 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 Pt will have improved gcode of CJ (20-40% limited) on FOTO shoulder in order to demonstrate true functional improvement.     Plan     Plan of care Certification: 12/5/2024 to 02/13/25.     Continue POC.     Robert Sculthorp PT, DPT

## 2024-12-20 ENCOUNTER — PATIENT MESSAGE (OUTPATIENT)
Dept: NEUROLOGY | Facility: CLINIC | Age: 29
End: 2024-12-20
Payer: COMMERCIAL

## 2024-12-20 DIAGNOSIS — R42 VERTIGO: Primary | ICD-10-CM

## 2024-12-20 LAB — VWF:AC ACT/NOR PPP IA: 104 % (ref 55–200)

## 2024-12-31 ENCOUNTER — PATIENT MESSAGE (OUTPATIENT)
Dept: INTERNAL MEDICINE | Facility: CLINIC | Age: 29
End: 2024-12-31
Payer: COMMERCIAL

## 2024-12-31 DIAGNOSIS — M75.41 SHOULDER IMPINGEMENT SYNDROME, RIGHT: Primary | ICD-10-CM

## 2025-01-10 ENCOUNTER — TELEPHONE (OUTPATIENT)
Dept: PAIN MEDICINE | Facility: CLINIC | Age: 30
End: 2025-01-10
Payer: COMMERCIAL

## 2025-01-13 ENCOUNTER — OFFICE VISIT (OUTPATIENT)
Dept: PAIN MEDICINE | Facility: CLINIC | Age: 30
End: 2025-01-13
Payer: COMMERCIAL

## 2025-01-13 VITALS
DIASTOLIC BLOOD PRESSURE: 72 MMHG | WEIGHT: 125.69 LBS | SYSTOLIC BLOOD PRESSURE: 132 MMHG | BODY MASS INDEX: 22.26 KG/M2 | TEMPERATURE: 99 F | RESPIRATION RATE: 18 BRPM | OXYGEN SATURATION: 98 % | HEART RATE: 70 BPM

## 2025-01-13 DIAGNOSIS — M19.019 AC JOINT ARTHROPATHY: Primary | ICD-10-CM

## 2025-01-13 DIAGNOSIS — M75.41 SHOULDER IMPINGEMENT SYNDROME, RIGHT: ICD-10-CM

## 2025-01-13 PROCEDURE — 3008F BODY MASS INDEX DOCD: CPT | Mod: CPTII,S$GLB,, | Performed by: ANESTHESIOLOGY

## 2025-01-13 PROCEDURE — 3078F DIAST BP <80 MM HG: CPT | Mod: CPTII,S$GLB,, | Performed by: ANESTHESIOLOGY

## 2025-01-13 PROCEDURE — 99204 OFFICE O/P NEW MOD 45 MIN: CPT | Mod: S$GLB,,, | Performed by: ANESTHESIOLOGY

## 2025-01-13 PROCEDURE — 1159F MED LIST DOCD IN RCRD: CPT | Mod: CPTII,S$GLB,, | Performed by: ANESTHESIOLOGY

## 2025-01-13 PROCEDURE — 3075F SYST BP GE 130 - 139MM HG: CPT | Mod: CPTII,S$GLB,, | Performed by: ANESTHESIOLOGY

## 2025-01-13 PROCEDURE — 99999 PR PBB SHADOW E&M-EST. PATIENT-LVL IV: CPT | Mod: PBBFAC,,, | Performed by: ANESTHESIOLOGY

## 2025-01-13 RX ORDER — LIDOCAINE AND PRILOCAINE 25; 25 MG/G; MG/G
CREAM TOPICAL
Qty: 30 G | Refills: 0 | Status: SHIPPED | OUTPATIENT
Start: 2025-01-13

## 2025-01-13 NOTE — H&P (VIEW-ONLY)
PCP: Anastasia Sung MD    REFERRING PHYSICIAN: Jameel Juarez NP    CHIEF COMPLAINT: right shoulder pain     Original HISTORY OF PRESENT ILLNESS: Devora Meeks presents to the clinic for the evaluation of the above pain. The pain started many years ago    Original Pain Description:  The pain is located in the right anterior shoulder and is radiating to the right hand . The pain is described as tingling and tearing . Exacerbating factors: activities using shoulder (Writing, reaching overhead). Mitigating factors rest. Symptoms interfere with daily activity, sleeping, and work. The patient feels like symptoms have been worsening. Patient denies night fever/night sweats, urinary incontinence, bowel incontinence, significant weight loss, and loss of sensations.    She does endorse weakness in the right hand as well in regards to  strength. She states she will feel pain shooting down from medial aspect of arm to the 4th/5th digit and endorses weakness in  strength.     She feels the shoulder will sublux and she has to relocate it every morning and will send a jolt down the arm.     Original PAIN SCORES:  Best: Pain is 4  Worst: Pain is 9  Current: Pain is 4        1/13/2025     1:13 PM   Last 3 PDI Scores   Pain Disability Index (PDI) 28       INTERVAL HISTORY: (Newest visit at the bottom)   Interval History (Date):       6 weeks of Conservative therapy:  PT: Attended 8 weeks in September to December 2024 (Must include dates)  Chiro: no  HEP: compliant with HEP       Treatments / Medications: (Ice/Heat/NSAIDS/APAP/etc):  Tylenol PRN  Ibuprofen PRN  Meloxicam - not taking anymore   Gabapentin 300 mg TID - partial relief     Interventional Pain Procedures: (Previous injections)  None     Past Medical History:   Diagnosis Date    Abnormal Pap smear of cervix      Past Surgical History:   Procedure Laterality Date    CERVICAL BIOPSY  W/ LOOP ELECTRODE EXCISION N/A 2016    one abnormal after,  clear since that time    COLONOSCOPY N/A 8/6/2024    Procedure: COLONOSCOPY;  Surgeon: Crispin Ardon MD;  [facility]  Service: Endoscopy;  Laterality: N/A;    COLPOSCOPY      ESOPHAGOGASTRODUODENOSCOPY N/A 8/6/2024    Procedure: EGD (ESOPHAGOGASTRODUODENOSCOPY);  Surgeon: Crispin Ardon MD;  [facility]  Service: Endoscopy;  Laterality: N/A;  6/14/24- case length adjusted - ERW     Social History     Socioeconomic History    Marital status: Single   Occupational History    Occupation:      Comment: [employer]   Tobacco Use    Smoking status: Never    Smokeless tobacco: Never    Tobacco comments:     Occasional social smoking rarely   Substance and Sexual Activity    Alcohol use: Yes     Comment: occasionally    Drug use: Never    Sexual activity: Yes     Partners: Male     Birth control/protection: Condom, OCP     Social Drivers of Health     Financial Resource Strain: Low Risk  (5/27/2024)    Overall Financial Resource Strain (CARDIA)     Difficulty of Paying Living Expenses: Not very hard   Food Insecurity: No Food Insecurity (5/27/2024)    Hunger Vital Sign     Worried About Running Out of Food in the Last Year: Never true     Ran Out of Food in the Last Year: Never true   Transportation Needs: No Transportation Needs (6/20/2024)    TRANSPORTATION NEEDS     Transportation : No   Physical Activity: Sufficiently Active (5/27/2024)    Exercise Vital Sign     Days of Exercise per Week: 4 days     Minutes of Exercise per Session: 40 min   Stress: Stress Concern Present (5/27/2024)    [place] [employer] - Occupational Stress Questionnaire     Feeling of Stress : Rather much   Housing Stability: Low Risk  (6/20/2024)    Housing Stability Vital Sign     Unable to Pay for Housing in the Last Year: No     Homeless in the Last Year: No     Family History   Problem Relation Name Age of Onset    Melanoma Mother Danya     Basal cell carcinoma  Mother Danya     Cancer Mother Danya     Hypertension Mother Danya     Miscarriages / Stillbirths Mother Danya     Skin cancer Brother  30    Heart attack Maternal Grandfather  60    Colon cancer Maternal Uncle  45    Arthritis Maternal Grandmother M     Heart disease Maternal Grandmother M        Review of patient's allergies indicates:  No Known Allergies    Current Outpatient Medications   Medication Sig    busPIRone (BUSPAR) 15 MG tablet Take 7.5 mg by mouth 2 (two) times daily.    butalbital-acetaminophen-caffeine -40 mg (FIORICET, ESGIC) -40 mg per tablet Take 1 tablet by mouth every 6 (six) hours as needed for Headaches. Nto to betaken more than 2 days out of the week.    gabapentin (NEURONTIN) 300 MG capsule Take 1 capsule (300 mg total) by mouth 2 (two) times daily.    HYDROcodone-acetaminophen (NORCO) 5-325 mg per tablet Take 1 tablet by mouth every 6 (six) hours as needed for Pain.    LIDOcaine-prilocaine (EMLA) cream Apply topically as needed (Apply 45 minutes before procedure).    medroxyPROGESTERone (PROVERA) 10 MG tablet Take 1 tablet (10 mg total) by mouth 2 (two) times a day. for 5 days    methylphenidate HCl (RITALIN) 5 MG tablet Take 5 mg by mouth 2 (two) times daily.    norethindrone (MICRONOR) 0.35 mg tablet Take 1 tablet (0.35 mg total) by mouth once daily.     No current facility-administered medications for this visit.       ROS:  GENERAL: No fever. No chills. No fatigue. Denies weight loss. Denies weight gain.  HEENT: Denies headaches. Denies vision change. Denies eye pain. Denies double vision. Denies ear pain.   CV: Denies chest pain.   PULM: Denies of shortness of breath.  GI: Denies constipation. No diarrhea. No abdominal pain. Denies nausea. Denies vomiting. No blood in stool.  HEME: Denies bleeding problems.  : Denies urgency. No painful urination. No blood in urine.  MS: Denies joint stiffness. Denies joint swelling.  Denies back pain.  SKIN: Denies rash.   NEURO: Denies  seizures. No weakness.  PSYCH:  Denies difficulty sleeping. No anxiety. Denies depression. No suicidal thoughts.       VITALS:   Vitals:    25 1313 25 1314   BP: 132/72    Pulse: 70    Resp: 18    Temp: 98.8 °F (37.1 °C)    SpO2: 98%    Weight: 57 kg (125 lb 10.6 oz)    PainSc:   4   4         PHYSICAL EXAM:   GENERAL: Well appearing, in no acute distress, alert and oriented x3.  PSYCH:  Mood and affect appropriate.  SKIN: Skin color, texture, turgor normal, no rashes or lesions.  HEENT:  Normocephalic, atraumatic. Cranial nerves grossly intact.  NECK: No pain to palpation over the cervical paraspinous muscles. No pain to palpation over facets. No pain with neck flexion, extension, or lateral flexion.   PULM: No evidence of respiratory difficulty, symmetric chest rise.  GI:  Non-distended  BACK: Normal range of motion. No pain to palpation over the spinous processes. No pain to palpation over facet joints. There is no pain with palpation over the sacroiliac joints bilaterally.   EXTREMITIES: No deformities, edema, or skin discoloration.   MUSCULOSKELETAL: Shoulder: pain to palpation over AC joint. +Scarf sign positive on right. + Speed's test  on right. Negative Neer test bilaterally. Negative Antoine Test bilaterally.    NEURO: Sensation is equal and appropriate bilaterally. Bilateral upper and lower extremity strength is normal and symmetric EXCEPT for reduced Right Finger Abduction 4+/5 and Finger Flexion 4+/5. Bilateral upper and lower extremity coordination and muscle stretch reflexes are physiologic and symmetric. Plantar response are downgoing. Straight leg raising in the supine position is negative to radicular pain.   GAIT: normal.    LABS:    [id number]    EXAMINATION:  MRI SHOULDER WITHOUT CONTRAST RIGHT     CLINICAL HISTORY:  Shoulder pain, adhesive capsulitis suspected, xray done;Shoulder pain, rotator cuff disorder suspected, xray done;  Pain in right shoulder    "  TECHNIQUE:  Multiplanar multisequence MRI examination of RIGHT shoulder.     COMPARISON:  09/17/2024.     FINDINGS:  ROTATOR CUFF:     Supraspinatus: Intact.  No tendinosis.     Infraspinatus: Intact.  No tendinosis.     Subscapularis: Intact.  No tendinosis.     Teres Minor: Intact.  No tendinosis.     There is minimal physiologic  fluid within the subacromial/subdeltoid bursa.     LABRUM: Superior labrum appears grossly intact on this standard non arthrogram exam.Posterior aspect of superior labrum ( "peel-back" location) appears intact. Posterior labrum is unremarkable.Anterior inferior labrum appears intact. IGHL:Intact.     LONG HEAD BICEPS TENDON: Located within bicipital groove and intact.Biceps-labral anchor is intact. No tendinosis.  No tenosynovitis. Rotator Interval is normal. Biceps pulley is intact.     BONES: No evident fracture.Visualized marrow within normal limits. AC joint demonstrates normal alignment with moderate hypertrophy.No significant osteo-acromial outlet narrowing (with mass effect on rotator cuff myotendinous junction) due to lateral downsloping of acromion or acromial spur.  There is no evident os acromiale.     CARTILAGE: Humeral head and glenoid cartilage preserved without focal defects. No subchondral marrow edema.  No synovial abnormality or intra-articular loose bodies. Glenoid fossa demonstrates no sclerosis.     MUSCLES:  Normal bulk and signal.     Impression:     No evidence for rotator cuff tear, labral tear, or occult osseous injury.     10/9/2024 MRA NECK WITH AND WITHOUT; MRI CERVICAL SPINE W WO CONTRAST     CLINICAL HISTORY:  possible vertebral artery insufficiency;; Neck pain, chronic;transverse ligament laxity; Cervicalgia     TECHNIQUE:  Multiplanar, multisequence MR images of the cervical spine were performed before and after the administration of 6 mL Gadavist intravenous contrast.     Obtained as a separate acquisition is a contrast enhanced MR arteriogram of the " cervical vasculature with multiple MIP reconstructions.     COMPARISON:  None.     FINDINGS:  The vertebral bodies demonstrate no evidence of fracture, osseous destructive process or aggressive bone marrow replacement process.     Slight straightening of the normal cervical lordosis.  No significant spondylolisthesis     Intervertebral disk space heights are well-maintained.  Modest posterior disc osteophyte complex at C6-7.  No large disc herniation or significant spinal stenosis at any level.  No high-grade neural foraminal narrowing.     Visualized spinal cord maintains normal morphology and signal. No focal lesions.  No abnormal postcontrast intrathecal enhancement.     No intraspinal mass or fluid collection.     No acute abnormalities in the visualized paraspinal soft tissue structures.     MRA:     Aortic arch appears within normal limits.  No significant stenosis at the major branch vessel origins.     The right common carotid arteries normal in caliber.  No significant stenosis at the carotid bifurcation.  Right internal carotid artery is normal in caliber.     The left common carotid arteries normal in caliber.  No significant stenosis at the carotid bifurcation.  The left internal carotid arteries normal in caliber.     The vertebral origins are patent.  Both vertebral arteries normal in caliber throughout their course without focal narrowing.     Limited evaluation intracranial vasculature is unremarkable.     Impression:     Modest cervical spondylosis as above, without significant spinal canal stenosis.     Unremarkable MR arteriogram of neck, without evidence of significant stenosis.       ASSESSMENT: 29 y.o. year old female with pain, consistent with:    Encounter Diagnoses   Name Primary?    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 She comes to us with right  shoulder pain which is worst with activity. She had no acute trauma. MRI shows moderate hypertrophy of the right AC joint. Exam shows pain reproduced with palpation over AC joint and with positive scarf sign. She's very nervous about the injection.     PLAN:  Discussed treatment options  Advised patient to apply voltaren gel over area of pain (AC Joint) up to 4 times a day as needed   Continue tylenol PRN  Patient on gabapentin currently - 300 mg TID    ORDER placed for Right AC Joint Injection Under Fluroscopy   Prescribed EMLA to be placed on skin 30 min prior to injection  5. RTC 2 weeks after procedure     I would like to thank Jameel Juarez NP for the opportunity to assist in the care of this patient. We had a very nice visit and I look forward to continuing their care. Please let me know if I can be of further assistance.     Sher Sampson  01/13/2025\

## 2025-01-13 NOTE — PROGRESS NOTES
PCP: Anastasia Sung MD    REFERRING PHYSICIAN: Jameel Juarez NP    CHIEF COMPLAINT: right shoulder pain     Original HISTORY OF PRESENT ILLNESS: Devora Meeks presents to the clinic for the evaluation of the above pain. The pain started many years ago    Original Pain Description:  The pain is located in the right anterior shoulder and is radiating to the right hand . The pain is described as tingling and tearing . Exacerbating factors: activities using shoulder (Writing, reaching overhead). Mitigating factors rest. Symptoms interfere with daily activity, sleeping, and work. The patient feels like symptoms have been worsening. Patient denies night fever/night sweats, urinary incontinence, bowel incontinence, significant weight loss, and loss of sensations.    She does endorse weakness in the right hand as well in regards to  strength. She states she will feel pain shooting down from medial aspect of arm to the 4th/5th digit and endorses weakness in  strength.     She feels the shoulder will sublux and she has to relocate it every morning and will send a jolt down the arm.     Original PAIN SCORES:  Best: Pain is 4  Worst: Pain is 9  Current: Pain is 4        1/13/2025     1:13 PM   Last 3 PDI Scores   Pain Disability Index (PDI) 28       INTERVAL HISTORY: (Newest visit at the bottom)   Interval History (Date):       6 weeks of Conservative therapy:  PT: Attended 8 weeks in September to December 2024 (Must include dates)  Chiro: no  HEP: compliant with HEP       Treatments / Medications: (Ice/Heat/NSAIDS/APAP/etc):  Tylenol PRN  Ibuprofen PRN  Meloxicam - not taking anymore   Gabapentin 300 mg TID - partial relief     Interventional Pain Procedures: (Previous injections)  None     Past Medical History:   Diagnosis Date    Abnormal Pap smear of cervix      Past Surgical History:   Procedure Laterality Date    CERVICAL BIOPSY  W/ LOOP ELECTRODE EXCISION N/A 2016    one abnormal after,  clear since that time    COLONOSCOPY N/A 8/6/2024    Procedure: COLONOSCOPY;  Surgeon: Crispin Ardon MD;  Location: [facility] ([facility]  Service: Endoscopy;  Laterality: N/A;    COLPOSCOPY      ESOPHAGOGASTRODUODENOSCOPY N/A 8/6/2024    Procedure: EGD (ESOPHAGOGASTRODUODENOSCOPY);  Surgeon: Crispin Ardon MD;  Location: [facility] ([facility]  Service: Endoscopy;  Laterality: N/A;  6/14/24- case length adjusted - ERW     Social History     Socioeconomic History    Marital status: Single   Occupational History    Occupation:      Comment: [employer]   Tobacco Use    Smoking status: Never    Smokeless tobacco: Never    Tobacco comments:     Occasional social smoking rarely   Substance and Sexual Activity    Alcohol use: Yes     Comment: occasionally    Drug use: Never    Sexual activity: Yes     Partners: Male     Birth control/protection: Condom, OCP     Social Drivers of Health     Financial Resource Strain: Low Risk  (5/27/2024)    Overall Financial Resource Strain (CARDIA)     Difficulty of Paying Living Expenses: Not very hard   Food Insecurity: No Food Insecurity (5/27/2024)    Hunger Vital Sign     Worried About Running Out of Food in the Last Year: Never true     Ran Out of Food in the Last Year: Never true   Transportation Needs: No Transportation Needs (6/20/2024)    TRANSPORTATION NEEDS     Transportation : No   Physical Activity: Sufficiently Active (5/27/2024)    Exercise Vital Sign     Days of Exercise per Week: 4 days     Minutes of Exercise per Session: 40 min   Stress: Stress Concern Present (5/27/2024)    [place] [place] [employer] - Occupational Stress Questionnaire     Feeling of Stress : Rather much   Housing Stability: Low Risk  (6/20/2024)    Housing Stability Vital Sign     Unable to Pay for Housing in the Last Year: No     Homeless in the Last Year: No     Family History   Problem Relation Name Age of Onset    Melanoma Mother Danya     Basal cell carcinoma  Mother Danya     Cancer Mother Danya     Hypertension Mother Danya     Miscarriages / Stillbirths Mother Danya     Skin cancer Brother  30    Heart attack Maternal Grandfather  60    Colon cancer Maternal Uncle  45    Arthritis Maternal Grandmother M     Heart disease Maternal Grandmother M        Review of patient's allergies indicates:  No Known Allergies    Current Outpatient Medications   Medication Sig    busPIRone (BUSPAR) 15 MG tablet Take 7.5 mg by mouth 2 (two) times daily.    butalbital-acetaminophen-caffeine -40 mg (FIORICET, ESGIC) -40 mg per tablet Take 1 tablet by mouth every 6 (six) hours as needed for Headaches. Nto to betaken more than 2 days out of the week.    gabapentin (NEURONTIN) 300 MG capsule Take 1 capsule (300 mg total) by mouth 2 (two) times daily.    HYDROcodone-acetaminophen (NORCO) 5-325 mg per tablet Take 1 tablet by mouth every 6 (six) hours as needed for Pain.    LIDOcaine-prilocaine (EMLA) cream Apply topically as needed (Apply 45 minutes before procedure).    medroxyPROGESTERone (PROVERA) 10 MG tablet Take 1 tablet (10 mg total) by mouth 2 (two) times a day. for 5 days    methylphenidate HCl (RITALIN) 5 MG tablet Take 5 mg by mouth 2 (two) times daily.    norethindrone (MICRONOR) 0.35 mg tablet Take 1 tablet (0.35 mg total) by mouth once daily.     No current facility-administered medications for this visit.       ROS:  GENERAL: No fever. No chills. No fatigue. Denies weight loss. Denies weight gain.  HEENT: Denies headaches. Denies vision change. Denies eye pain. Denies double vision. Denies ear pain.   CV: Denies chest pain.   PULM: Denies of shortness of breath.  GI: Denies constipation. No diarrhea. No abdominal pain. Denies nausea. Denies vomiting. No blood in stool.  HEME: Denies bleeding problems.  : Denies urgency. No painful urination. No blood in urine.  MS: Denies joint stiffness. Denies joint swelling.  Denies back pain.  SKIN: Denies rash.   NEURO: Denies  seizures. No weakness.  PSYCH:  Denies difficulty sleeping. No anxiety. Denies depression. No suicidal thoughts.       VITALS:   Vitals:    25 1313 25 1314   BP: 132/72    Pulse: 70    Resp: 18    Temp: 98.8 °F (37.1 °C)    SpO2: 98%    Weight: 57 kg (125 lb 10.6 oz)    PainSc:   4   4         PHYSICAL EXAM:   GENERAL: Well appearing, in no acute distress, alert and oriented x3.  PSYCH:  Mood and affect appropriate.  SKIN: Skin color, texture, turgor normal, no rashes or lesions.  HEENT:  Normocephalic, atraumatic. Cranial nerves grossly intact.  NECK: No pain to palpation over the cervical paraspinous muscles. No pain to palpation over facets. No pain with neck flexion, extension, or lateral flexion.   PULM: No evidence of respiratory difficulty, symmetric chest rise.  GI:  Non-distended  BACK: Normal range of motion. No pain to palpation over the spinous processes. No pain to palpation over facet joints. There is no pain with palpation over the sacroiliac joints bilaterally.   EXTREMITIES: No deformities, edema, or skin discoloration.   MUSCULOSKELETAL: Shoulder: pain to palpation over AC joint. +Scarf sign positive on right. + Speed's test  on right. Negative Neer test bilaterally. Negative Antoine Test bilaterally.    NEURO: Sensation is equal and appropriate bilaterally. Bilateral upper and lower extremity strength is normal and symmetric EXCEPT for reduced Right Finger Abduction 4+/5 and Finger Flexion 4+/5. Bilateral upper and lower extremity coordination and muscle stretch reflexes are physiologic and symmetric. Plantar response are downgoing. Straight leg raising in the supine position is negative to radicular pain.   GAIT: normal.    LABS:    [id number]    EXAMINATION:  MRI SHOULDER WITHOUT CONTRAST RIGHT     CLINICAL HISTORY:  Shoulder pain, adhesive capsulitis suspected, xray done;Shoulder pain, rotator cuff disorder suspected, xray done;  Pain in right shoulder    "  TECHNIQUE:  Multiplanar multisequence MRI examination of RIGHT shoulder.     COMPARISON:  09/17/2024.     FINDINGS:  ROTATOR CUFF:     Supraspinatus: Intact.  No tendinosis.     Infraspinatus: Intact.  No tendinosis.     Subscapularis: Intact.  No tendinosis.     Teres Minor: Intact.  No tendinosis.     There is minimal physiologic  fluid within the subacromial/subdeltoid bursa.     LABRUM: Superior labrum appears grossly intact on this standard non arthrogram exam.Posterior aspect of superior labrum ( "peel-back" location) appears intact. Posterior labrum is unremarkable.Anterior inferior labrum appears intact. IGHL:Intact.     LONG HEAD BICEPS TENDON: Located within bicipital groove and intact.Biceps-labral anchor is intact. No tendinosis.  No tenosynovitis. Rotator Interval is normal. Biceps pulley is intact.     BONES: No evident fracture.Visualized marrow within normal limits. AC joint demonstrates normal alignment with moderate hypertrophy.No significant osteo-acromial outlet narrowing (with mass effect on rotator cuff myotendinous junction) due to lateral downsloping of acromion or acromial spur.  There is no evident os acromiale.     CARTILAGE: Humeral head and glenoid cartilage preserved without focal defects. No subchondral marrow edema.  No synovial abnormality or intra-articular loose bodies. Glenoid fossa demonstrates no sclerosis.     MUSCLES:  Normal bulk and signal.     Impression:     No evidence for rotator cuff tear, labral tear, or occult osseous injury.     10/9/2024 MRA NECK WITH AND WITHOUT; MRI CERVICAL SPINE W WO CONTRAST     CLINICAL HISTORY:  possible vertebral artery insufficiency;; Neck pain, chronic;transverse ligament laxity; Cervicalgia     TECHNIQUE:  Multiplanar, multisequence MR images of the cervical spine were performed before and after the administration of 6 mL Gadavist intravenous contrast.     Obtained as a separate acquisition is a contrast enhanced MR arteriogram of the " cervical vasculature with multiple MIP reconstructions.     COMPARISON:  None.     FINDINGS:  The vertebral bodies demonstrate no evidence of fracture, osseous destructive process or aggressive bone marrow replacement process.     Slight straightening of the normal cervical lordosis.  No significant spondylolisthesis     Intervertebral disk space heights are well-maintained.  Modest posterior disc osteophyte complex at C6-7.  No large disc herniation or significant spinal stenosis at any level.  No high-grade neural foraminal narrowing.     Visualized spinal cord maintains normal morphology and signal. No focal lesions.  No abnormal postcontrast intrathecal enhancement.     No intraspinal mass or fluid collection.     No acute abnormalities in the visualized paraspinal soft tissue structures.     MRA:     Aortic arch appears within normal limits.  No significant stenosis at the major branch vessel origins.     The right common carotid arteries normal in caliber.  No significant stenosis at the carotid bifurcation.  Right internal carotid artery is normal in caliber.     The left common carotid arteries normal in caliber.  No significant stenosis at the carotid bifurcation.  The left internal carotid arteries normal in caliber.     The vertebral origins are patent.  Both vertebral arteries normal in caliber throughout their course without focal narrowing.     Limited evaluation intracranial vasculature is unremarkable.     Impression:     Modest cervical spondylosis as above, without significant spinal canal stenosis.     Unremarkable MR arteriogram of neck, without evidence of significant stenosis.       ASSESSMENT: 29 y.o. year old female with pain, consistent with:    Encounter Diagnoses   Name Primary?    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 She comes to us with right  shoulder pain which is worst with activity. She had no acute trauma. MRI shows moderate hypertrophy of the right AC joint. Exam shows pain reproduced with palpation over AC joint and with positive scarf sign. She's very nervous about the injection.     PLAN:  Discussed treatment options  Advised patient to apply voltaren gel over area of pain (AC Joint) up to 4 times a day as needed   Continue tylenol PRN  Patient on gabapentin currently - 300 mg TID    ORDER placed for Right AC Joint Injection Under Fluroscopy   Prescribed EMLA to be placed on skin 30 min prior to injection  5. RTC 2 weeks after procedure     I would like to thank Jameel Juarez NP for the opportunity to assist in the care of this patient. We had a very nice visit and I look forward to continuing their care. Please let me know if I can be of further assistance.     Sher Sampson  01/13/2025\

## 2025-01-14 ENCOUNTER — PATIENT MESSAGE (OUTPATIENT)
Dept: PAIN MEDICINE | Facility: OTHER | Age: 30
End: 2025-01-14
Payer: COMMERCIAL

## 2025-01-23 ENCOUNTER — PATIENT MESSAGE (OUTPATIENT)
Dept: ADMINISTRATIVE | Facility: OTHER | Age: 30
End: 2025-01-23
Payer: COMMERCIAL

## 2025-01-29 ENCOUNTER — HOSPITAL ENCOUNTER (OUTPATIENT)
Facility: OTHER | Age: 30
Discharge: HOME | End: 2025-01-29
Attending: ANESTHESIOLOGY | Admitting: ANESTHESIOLOGY
Payer: COMMERCIAL

## 2025-01-29 VITALS
SYSTOLIC BLOOD PRESSURE: 104 MMHG | OXYGEN SATURATION: 100 % | HEART RATE: 55 BPM | TEMPERATURE: 99 F | DIASTOLIC BLOOD PRESSURE: 65 MMHG | BODY MASS INDEX: 22.15 KG/M2 | WEIGHT: 125 LBS | HEIGHT: 63 IN | RESPIRATION RATE: 18 BRPM

## 2025-01-29 DIAGNOSIS — M19.019 OSTEOARTHRITIS OF AC (ACROMIOCLAVICULAR) JOINT: Primary | ICD-10-CM

## 2025-01-29 DIAGNOSIS — G89.29 CHRONIC PAIN: ICD-10-CM

## 2025-01-29 PROCEDURE — 63600175 PHARM REV CODE 636 W HCPCS: Performed by: ANESTHESIOLOGY

## 2025-01-29 PROCEDURE — 20605 DRAIN/INJ JOINT/BURSA W/O US: CPT | Mod: RT,,, | Performed by: ANESTHESIOLOGY

## 2025-01-29 PROCEDURE — 20605 DRAIN/INJ JOINT/BURSA W/O US: CPT | Performed by: ANESTHESIOLOGY

## 2025-01-29 PROCEDURE — 25500020 PHARM REV CODE 255: Performed by: ANESTHESIOLOGY

## 2025-01-29 PROCEDURE — 77002 NEEDLE LOCALIZATION BY XRAY: CPT | Mod: 26,,, | Performed by: ANESTHESIOLOGY

## 2025-01-29 PROCEDURE — 25000003 PHARM REV CODE 250: Performed by: ANESTHESIOLOGY

## 2025-01-29 RX ORDER — TRIAMCINOLONE ACETONIDE 40 MG/ML
INJECTION, SUSPENSION INTRA-ARTICULAR; INTRAMUSCULAR
Status: DISCONTINUED | OUTPATIENT
Start: 2025-01-29 | End: 2025-01-29 | Stop reason: HOSPADM

## 2025-01-29 RX ORDER — BUPIVACAINE HYDROCHLORIDE 2.5 MG/ML
INJECTION, SOLUTION EPIDURAL; INFILTRATION; INTRACAUDAL
Status: DISCONTINUED | OUTPATIENT
Start: 2025-01-29 | End: 2025-01-29 | Stop reason: HOSPADM

## 2025-01-29 RX ORDER — SODIUM CHLORIDE 9 MG/ML
INJECTION, SOLUTION INTRAVENOUS CONTINUOUS
Status: DISCONTINUED | OUTPATIENT
Start: 2025-01-29 | End: 2025-01-29 | Stop reason: HOSPADM

## 2025-01-29 RX ORDER — LIDOCAINE HYDROCHLORIDE 20 MG/ML
INJECTION, SOLUTION INFILTRATION; PERINEURAL
Status: DISCONTINUED | OUTPATIENT
Start: 2025-01-29 | End: 2025-01-29 | Stop reason: HOSPADM

## 2025-01-29 RX ORDER — ALPRAZOLAM 0.5 MG/1
1 TABLET, ORALLY DISINTEGRATING ORAL ONCE
Status: COMPLETED | OUTPATIENT
Start: 2025-01-29 | End: 2025-01-29

## 2025-01-29 RX ADMIN — ALPRAZOLAM 1 MG: 0.5 TABLET, ORALLY DISINTEGRATING ORAL at 09:01

## 2025-01-29 NOTE — OP NOTE
Acromioclavicular Joint Injection under Fluoroscopic Guidance    The procedure, risks, benefits, and options were discussed with the patient. There are no contraindications to the procedure. The patent expressed understanding and agreed to the procedure. Informed written consent was obtained prior to the start of the procedure and can be found in the patient's chart.    PATIENT NAME: Devora Meeks   MRN: [id number]     DATE OF PROCEDURE: 01/29/2025    PROCEDURE: Right Acromioclavicular Joint Injection under Fluoroscopic Guidance    PRE-OP DIAGNOSIS: AC joint arthropathy [M19.019]    POST-OP DIAGNOSIS: Same    PHYSICIAN: Karena Davidson MD    ASSISTANTS: Willie Junior MD  [facility] pain fellow     MEDICATIONS INJECTED: Preservative-free Kenalog 40mg with 1cc of Bupivacine 0.25%     LOCAL ANESTHETIC INJECTED: Xylocaine 2%     SEDATION: None    ESTIMATED BLOOD LOSS: None    COMPLICATIONS: None    TECHNIQUE: Time-out was performed to identify the patient and procedure to be performed. With the patient laying in a supine position, the surgical area was prepped and draped in the usual sterile fashion using ChloraPrep and a fenestrated drape. The acromioclavicular joint of the shoulder was identified under fluoroscopy guidance. Skin anesthesia was achieved by injecting Lidocaine 2% over the injection site. A 25 gauge,  1.5 inch needle was slowly advanced through under fluoroscopic guidance into the acromioclavicular joint until os was encountered. Once the needle tip was in the area of the joint, and there was no blood,  Contrast dye  Omnipaque (300mg/mL) was injected to confirm placement and there was no vascular runoff. 2 mL of the medication mixture listed above was injected slowly in to the joint. Displacement of the radio opaque contrast after injection of the medication confirmed intra-articular contrast spread in the joint. The needles were removed and bleeding was nil. A sterile dressing was applied. No specimens  collected. The patient tolerated the procedure well.       The patient was monitored after the procedure in the recovery area. They were given post-procedure and discharge instructions to follow at home. The patient was discharged in a stable condition.    Willie Junior MD     I reviewed and edited the fellow's note. I conducted my own interview and physical examination. I agree with the findings. I was present and supervising all critical portions of the procedure.

## 2025-01-29 NOTE — DISCHARGE SUMMARY
Discharge Note  Short Stay      SUMMARY     Admit Date: 1/29/2025    Attending Physician: Karena Davidson MD    Discharge Physician: Karena Davidson MD      Discharge Date: 1/29/2025 9:51 AM    Procedure(s) (LRB):  INJECTION, RIGHT ACRIO CLAVICULAR JOINT (Right)    Final Diagnosis: AC joint arthropathy [M19.019]    Disposition: Home or self care    Patient Instructions:   Current Discharge Medication List        CONTINUE these medications which have NOT CHANGED    Details   busPIRone (BUSPAR) 15 MG tablet Take 7.5 mg by mouth 2 (two) times daily.      butalbital-acetaminophen-caffeine -40 mg (FIORICET, ESGIC) -40 mg per tablet Take 1 tablet by mouth every 6 (six) hours as needed for Headaches. Nto to betaken more than 2 days out of the week.  Qty: 20 tablet, Refills: 0    Associated Diagnoses: Intractable migraine with aura without status migrainosus      gabapentin (NEURONTIN) 300 MG capsule Take 1 capsule (300 mg total) by mouth 2 (two) times daily.  Qty: 180 capsule, Refills: 3    Associated Diagnoses: Shoulder impingement syndrome, right      HYDROcodone-acetaminophen (NORCO) 5-325 mg per tablet Take 1 tablet by mouth every 6 (six) hours as needed for Pain.  Qty: 10 tablet, Refills: 0    Comments: Quantity prescribed more than 7 day supply? No  Associated Diagnoses: Pelvic pain in female; Dysmenorrhea; Left lower quadrant abdominal pain      LIDOcaine-prilocaine (EMLA) cream Apply topically as needed (Apply 45 minutes before procedure).  Qty: 30 g, Refills: 0      medroxyPROGESTERone (PROVERA) 10 MG tablet Take 1 tablet (10 mg total) by mouth 2 (two) times a day. for 5 days  Qty: 10 tablet, Refills: 0    Associated Diagnoses: Menorrhagia with irregular cycle      methylphenidate HCl (RITALIN) 5 MG tablet Take 5 mg by mouth 2 (two) times daily.      norethindrone (MICRONOR) 0.35 mg tablet Take 1 tablet (0.35 mg total) by mouth once daily.  Qty: 90 tablet, Refills: 3    Associated Diagnoses:  Menorrhagia with irregular cycle                 Discharge Diagnosis: AC joint arthropathy [M19.019]  Condition on Discharge: Stable with no complications to procedure   Diet on Discharge: Same as before.  Activity: as per instruction sheet.  Discharge to: Home with a responsible adult.  Follow up: 2-4 weeks       Please call my office or pager at [phone] if experienced any weakness or loss of sensation, fever > 101.5, pain uncontrolled with oral medications, persistent nausea/vomiting/or diarrhea, redness or drainage from the incisions, or any other worrisome concerns. If physician on call was not reached or could not communicate with our office for any reason please go to the nearest emergency department     Willie Junior MD

## 2025-01-29 NOTE — DISCHARGE INSTRUCTIONS

## 2025-02-13 ENCOUNTER — OFFICE VISIT (OUTPATIENT)
Dept: PAIN MEDICINE | Facility: CLINIC | Age: 30
End: 2025-02-13
Payer: COMMERCIAL

## 2025-02-13 VITALS
HEART RATE: 62 BPM | SYSTOLIC BLOOD PRESSURE: 123 MMHG | DIASTOLIC BLOOD PRESSURE: 74 MMHG | WEIGHT: 125.69 LBS | TEMPERATURE: 98 F | RESPIRATION RATE: 18 BRPM | OXYGEN SATURATION: 100 % | BODY MASS INDEX: 22.27 KG/M2 | HEIGHT: 63 IN

## 2025-02-13 DIAGNOSIS — M75.41 SHOULDER IMPINGEMENT SYNDROME, RIGHT: Primary | ICD-10-CM

## 2025-02-13 DIAGNOSIS — R20.0 NUMBNESS AND TINGLING OF RIGHT ARM: ICD-10-CM

## 2025-02-13 DIAGNOSIS — R20.2 NUMBNESS AND TINGLING OF RIGHT ARM: ICD-10-CM

## 2025-02-13 PROCEDURE — 3078F DIAST BP <80 MM HG: CPT | Mod: CPTII,S$GLB,, | Performed by: NURSE PRACTITIONER

## 2025-02-13 PROCEDURE — 3008F BODY MASS INDEX DOCD: CPT | Mod: CPTII,S$GLB,, | Performed by: NURSE PRACTITIONER

## 2025-02-13 PROCEDURE — 99999 PR PBB SHADOW E&M-EST. PATIENT-LVL IV: CPT | Mod: PBBFAC,,, | Performed by: NURSE PRACTITIONER

## 2025-02-13 PROCEDURE — 1160F RVW MEDS BY RX/DR IN RCRD: CPT | Mod: CPTII,S$GLB,, | Performed by: NURSE PRACTITIONER

## 2025-02-13 PROCEDURE — 1159F MED LIST DOCD IN RCRD: CPT | Mod: CPTII,S$GLB,, | Performed by: NURSE PRACTITIONER

## 2025-02-13 PROCEDURE — 3074F SYST BP LT 130 MM HG: CPT | Mod: CPTII,S$GLB,, | Performed by: NURSE PRACTITIONER

## 2025-02-13 PROCEDURE — 99214 OFFICE O/P EST MOD 30 MIN: CPT | Mod: S$GLB,,, | Performed by: NURSE PRACTITIONER

## 2025-02-13 NOTE — PROGRESS NOTES
PCP: Anastasia Sung MD    REFERRING PHYSICIAN: No ref. provider found    CHIEF COMPLAINT: right shoulder pain     Original HISTORY OF PRESENT ILLNESS: Devora Meeks presents to the clinic for the evaluation of the above pain. The pain started many years ago    Original Pain Description:  The pain is located in the right anterior shoulder and is radiating to the right hand . The pain is described as tingling and tearing . Exacerbating factors: activities using shoulder (Writing, reaching overhead). Mitigating factors rest. Symptoms interfere with daily activity, sleeping, and work. The patient feels like symptoms have been worsening. Patient denies night fever/night sweats, urinary incontinence, bowel incontinence, significant weight loss, and loss of sensations.    She does endorse weakness in the right hand as well in regards to  strength. She states she will feel pain shooting down from medial aspect of arm to the 4th/5th digit and endorses weakness in  strength.     She feels the shoulder will sublux and she has to relocate it every morning and will send a jolt down the arm.     Original PAIN SCORES:  Best: Pain is 4  Worst: Pain is 9  Current: Pain is 4        2/13/2025    10:26 AM   Last 3 PDI Scores   Pain Disability Index (PDI) 42       INTERVAL HISTORY: (Newest visit at the bottom)   Interval History (Date):     Interval History 2/13/2025:  The patient returns to clinic today for follow up of shoulder pain. She is s/p right AC joint injection on 1/29/2025. She reports 70% relief for a week. Her pain has returned to baseline. She continues to report right shoulder pain. She does have radiating pain into the arm and hand. This pain is keeping her awake   She has to pop the shoulder into place in the morning. She is currently taking Gabapentin. She denies any other health changes. Her pain today is 6/10.    6 weeks of Conservative therapy:  PT: Attended 8 weeks in September to December  2024 (Must include dates)  Chiro: no  HEP: compliant with HEP       Treatments / Medications: (Ice/Heat/NSAIDS/APAP/etc):  Tylenol PRN  Ibuprofen PRN  Meloxicam - not taking anymore   Gabapentin 300 mg TID - partial relief     Interventional Pain Procedures: (Previous injections)  1/29/2025- Right AC joint injection- 70% relief for one month.     Past Medical History:   Diagnosis Date    Abnormal Pap smear of cervix      Past Surgical History:   Procedure Laterality Date    CERVICAL BIOPSY  W/ LOOP ELECTRODE EXCISION N/A 2016    one abnormal after, clear since that time    COLONOSCOPY N/A 8/6/2024    Procedure: COLONOSCOPY;  Surgeon: Crispin Ardon MD;  Location: [facility] ENDO (4TH FLR);  Service: Endoscopy;  Laterality: N/A;    COLPOSCOPY      ESOPHAGOGASTRODUODENOSCOPY N/A 8/6/2024    Procedure: EGD (ESOPHAGOGASTRODUODENOSCOPY);  Surgeon: Crispin Ardon MD;  Location: [facility] (4TH FLR);  Service: Endoscopy;  Laterality: N/A;  6/14/24- case length adjusted - ERW    INJECTION OF ANESTHETIC AGENT AROUND NERVE Right 1/29/2025    Procedure: INJECTION, RIGHT ACRIO CLAVICULAR JOINT;  Surgeon: Karena Davidson MD;  [place] [facility] [place] [facility] [place]  Service: Pain Management;  Laterality: Right;  2 wk f/u rodrick     Social History     Socioeconomic History    Marital status: Single   Occupational History    Occupation:      Comment: [employer]   Tobacco Use    Smoking status: Never    Smokeless tobacco: Never    Tobacco comments:     Occasional social smoking rarely   Substance and Sexual Activity    Alcohol use: Yes     Comment: occasionally    Drug use: Never    Sexual activity: Yes     Partners: Male     Birth control/protection: Condom, OCP     Social Drivers of Health     Financial Resource Strain: Low Risk  (5/27/2024)    Overall Financial Resource Strain (CARDIA)     Difficulty of Paying Living Expenses: Not very hard   Food Insecurity: No Food Insecurity (5/27/2024)    Hunger Vital Sign     Worried About  Running Out of Food in the Last Year: Never true     Ran Out of Food in the Last Year: Never true   Transportation Needs: No Transportation Needs (6/20/2024)    TRANSPORTATION NEEDS     Transportation : No   Physical Activity: Sufficiently Active (5/27/2024)    Exercise Vital Sign     Days of Exercise per Week: 4 days     Minutes of Exercise per Session: 40 min   Stress: Stress Concern Present (5/27/2024)    [employer] - Occupational Stress Questionnaire     Feeling of Stress : Rather much   Housing Stability: Unknown (6/20/2024)    Housing Stability Vital Sign     Unable to Pay for Housing in the Last Year: No     Homeless in the Last Year: No     Family History   Problem Relation Name Age of Onset    Melanoma Mother Danya     Basal cell carcinoma Mother Danya     Cancer Mother Danya     Hypertension Mother Danya     Miscarriages / Stillbirths Mother Danya     Skin cancer Brother  30    Heart attack Maternal Grandfather  60    Colon cancer Maternal Uncle  45    Arthritis Maternal Grandmother M     Heart disease Maternal Grandmother M        Review of patient's allergies indicates:  No Known Allergies    Current Outpatient Medications   Medication Sig    busPIRone (BUSPAR) 15 MG tablet Take 7.5 mg by mouth 2 (two) times daily.    butalbital-acetaminophen-caffeine -40 mg (FIORICET, ESGIC) -40 mg per tablet Take 1 tablet by mouth every 6 (six) hours as needed for Headaches. Nto to betaken more than 2 days out of the week.    gabapentin (NEURONTIN) 300 MG capsule Take 1 capsule (300 mg total) by mouth 2 (two) times daily.    HYDROcodone-acetaminophen (NORCO) 5-325 mg per tablet Take 1 tablet by mouth every 6 (six) hours as needed for Pain.    LIDOcaine-prilocaine (EMLA) cream Apply topically as needed (Apply 45 minutes before procedure).    methylphenidate HCl (RITALIN) 5 MG tablet Take 5 mg by mouth 2 (two) times daily.    norethindrone (MICRONOR) 0.35 mg tablet Take 1 tablet  "(0.35 mg total) by mouth once daily.    medroxyPROGESTERone (PROVERA) 10 MG tablet Take 1 tablet (10 mg total) by mouth 2 (two) times a day. for 5 days     No current facility-administered medications for this visit.       ROS:  GENERAL: No fever. No chills. No fatigue. Denies weight loss. Denies weight gain.  HEENT: Denies headaches. Denies vision change. Denies eye pain. Denies double vision. Denies ear pain.   CV: Denies chest pain.   PULM: Denies of shortness of breath.  GI: Denies constipation. No diarrhea. No abdominal pain. Denies nausea. Denies vomiting. No blood in stool.  HEME: Denies bleeding problems.  : Denies urgency. No painful urination. No blood in urine.  MS: Denies joint stiffness. Denies joint swelling.  Denies back pain.  SKIN: Denies rash.   NEURO: Denies seizures. No weakness.  PSYCH:  Denies difficulty sleeping. No anxiety. Denies depression. No suicidal thoughts.       VITALS:   Vitals:    25 1025   BP: 123/74   Pulse: 62   Resp: 18   Temp: 98 °F (36.7 °C)   SpO2: 100%   Weight: 57 kg (125 lb 10.6 oz)   Height: 5' 3" (1.6 m)   PainSc:   6   PainLoc: Shoulder         PHYSICAL EXAM:   GENERAL: Well appearing, in no acute distress, alert and oriented x3.  PSYCH:  Mood and affect appropriate.  SKIN: Skin color, texture, turgor normal, no rashes or lesions.  HEENT:  Normocephalic, atraumatic. Cranial nerves grossly intact.  NECK: . No pain with neck flexion, extension, or lateral flexion.   PULM: No evidence of respiratory difficulty, symmetric chest rise.  EXTREMITIES: No deformities, edema, or skin discoloration.   MUSCULOSKELETAL: Shoulder: pain to palpation over AC joint. Subluxation noted. Painful internal rotation.   NEURO: Sensation is equal and appropriate bilaterally. Bilateral upper extremity strength is normal and symmetric EXCEPT for reduced Right Finger Abduction 4+/5 and Finger Flexion 4+/5.   GAIT: normal.    LABS:    [id number]    EXAMINATION:  MRI SHOULDER " "WITHOUT CONTRAST RIGHT     CLINICAL HISTORY:  Shoulder pain, adhesive capsulitis suspected, xray done;Shoulder pain, rotator cuff disorder suspected, xray done;  Pain in right shoulder     TECHNIQUE:  Multiplanar multisequence MRI examination of RIGHT shoulder.     COMPARISON:  09/17/2024.     FINDINGS:  ROTATOR CUFF:     Supraspinatus: Intact.  No tendinosis.     Infraspinatus: Intact.  No tendinosis.     Subscapularis: Intact.  No tendinosis.     Teres Minor: Intact.  No tendinosis.     There is minimal physiologic  fluid within the subacromial/subdeltoid bursa.     LABRUM: Superior labrum appears grossly intact on this standard non arthrogram exam.Posterior aspect of superior labrum ( "peel-back" location) appears intact. Posterior labrum is unremarkable.Anterior inferior labrum appears intact. IGHL:Intact.     LONG HEAD BICEPS TENDON: Located within bicipital groove and intact.Biceps-labral anchor is intact. No tendinosis.  No tenosynovitis. Rotator Interval is normal. Biceps pulley is intact.     BONES: No evident fracture.Visualized marrow within normal limits. AC joint demonstrates normal alignment with moderate hypertrophy.No significant osteo-acromial outlet narrowing (with mass effect on rotator cuff myotendinous junction) due to lateral downsloping of acromion or acromial spur.  There is no evident os acromiale.     CARTILAGE: Humeral head and glenoid cartilage preserved without focal defects. No subchondral marrow edema.  No synovial abnormality or intra-articular loose bodies. Glenoid fossa demonstrates no sclerosis.     MUSCLES:  Normal bulk and signal.     Impression:     No evidence for rotator cuff tear, labral tear, or occult osseous injury.     10/9/2024 MRA NECK WITH AND WITHOUT; MRI CERVICAL SPINE W WO CONTRAST     CLINICAL HISTORY:  possible vertebral artery insufficiency;; Neck pain, chronic;transverse ligament laxity; Cervicalgia     TECHNIQUE:  Multiplanar, multisequence MR images of the " cervical spine were performed before and after the administration of 6 mL Gadavist intravenous contrast.     Obtained as a separate acquisition is a contrast enhanced MR arteriogram of the cervical vasculature with multiple MIP reconstructions.     COMPARISON:  None.     FINDINGS:  The vertebral bodies demonstrate no evidence of fracture, osseous destructive process or aggressive bone marrow replacement process.     Slight straightening of the normal cervical lordosis.  No significant spondylolisthesis     Intervertebral disk space heights are well-maintained.  Modest posterior disc osteophyte complex at C6-7.  No large disc herniation or significant spinal stenosis at any level.  No high-grade neural foraminal narrowing.     Visualized spinal cord maintains normal morphology and signal. No focal lesions.  No abnormal postcontrast intrathecal enhancement.     No intraspinal mass or fluid collection.     No acute abnormalities in the visualized paraspinal soft tissue structures.     MRA:     Aortic arch appears within normal limits.  No significant stenosis at the major branch vessel origins.     The right common carotid arteries normal in caliber.  No significant stenosis at the carotid bifurcation.  Right internal carotid artery is normal in caliber.     The left common carotid arteries normal in caliber.  No significant stenosis at the carotid bifurcation.  The left internal carotid arteries normal in caliber.     The vertebral origins are patent.  Both vertebral arteries normal in caliber throughout their course without focal narrowing.     Limited evaluation intracranial vasculature is unremarkable.     Impression:     Modest cervical spondylosis as above, without significant spinal canal stenosis.     Unremarkable MR arteriogram of neck, without evidence of significant stenosis.       ASSESSMENT: 29 y.o. year old female with pain, consistent with:    Encounter Diagnoses   Name Primary?    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 She comes to us with right shoulder pain which is worst with activity. She had no acute trauma. MRI shows moderate hypertrophy of the right AC joint. Exam shows pain reproduced with palpation over AC joint and with positive scarf sign. She's very nervous about the injection.     PLAN:    - Previous imaging reviewed. Labs reviewed.     - She is s/p right AC joint injection with 1 week of relief.     - EMG of RUE.     - Consult Sports Medicine.     - Continue Gabapentin.     - Continue home exercise routine.     - RTC in 1 month.     The above plan and management options were discussed at length with patient. Patient is in agreement with the above and verbalized understanding.     Brandi Martinez NP  02/13/2025

## 2025-02-14 ENCOUNTER — TELEPHONE (OUTPATIENT)
Dept: SPORTS MEDICINE | Facility: CLINIC | Age: 30
End: 2025-02-14
Payer: COMMERCIAL

## 2025-02-14 NOTE — TELEPHONE ENCOUNTER
I called the patient and left a message encouraging her to return the call to schedule an appointment with Dr. Winters for her shoulder after receiving the below message

## 2025-02-14 NOTE — TELEPHONE ENCOUNTER
----- Message from Nicky Winters MD sent at 2/13/2025 12:43 PM CST -----  Regarding: FW: Referral    ----- Message -----  From: Jay Hanna MA  Sent: 2/13/2025  11:21 AM CST  To: Nicky Winters MD  Subject: Referral                                         Good morning,     Brandi Martinez NP placed a referral for this patient to be seen in your clinic. Can someone reach out to patient and assist her with scheduling.    Kindly,  Pain management

## 2025-02-16 ENCOUNTER — OFFICE VISIT (OUTPATIENT)
Dept: URGENT CARE | Facility: CLINIC | Age: 30
End: 2025-02-16
Payer: COMMERCIAL

## 2025-02-16 VITALS
TEMPERATURE: 98 F | DIASTOLIC BLOOD PRESSURE: 77 MMHG | RESPIRATION RATE: 18 BRPM | BODY MASS INDEX: 22.15 KG/M2 | OXYGEN SATURATION: 98 % | HEIGHT: 63 IN | WEIGHT: 125 LBS | SYSTOLIC BLOOD PRESSURE: 140 MMHG | HEART RATE: 68 BPM

## 2025-02-16 DIAGNOSIS — M25.511 RIGHT SHOULDER PAIN, UNSPECIFIED CHRONICITY: Primary | ICD-10-CM

## 2025-02-16 RX ORDER — HYDROCODONE BITARTRATE AND ACETAMINOPHEN 5; 325 MG/1; MG/1
1 TABLET ORAL EVERY 8 HOURS PRN
Qty: 12 TABLET | Refills: 0 | Status: SHIPPED | OUTPATIENT
Start: 2025-02-16

## 2025-02-16 RX ORDER — KETOROLAC TROMETHAMINE 30 MG/ML
30 INJECTION, SOLUTION INTRAMUSCULAR; INTRAVENOUS
Status: COMPLETED | OUTPATIENT
Start: 2025-02-16 | End: 2025-02-16

## 2025-02-16 RX ADMIN — KETOROLAC TROMETHAMINE 30 MG: 30 INJECTION, SOLUTION INTRAMUSCULAR; INTRAVENOUS at 04:02

## 2025-02-16 NOTE — PROGRESS NOTES
"Subjective:      Patient ID: Devora Meeks is a 29 y.o. female.    Vitals:  height is 5' 3" (1.6 m) and weight is 56.7 kg (125 lb). Her oral temperature is 97.8 °F (36.6 °C). Her blood pressure is 140/77 (abnormal) and her pulse is 68. Her respiration is 18 and oxygen saturation is 98%.     Chief Complaint: Shoulder Pain    Patient presents with c.o chronic rt shoulder pain. Patient reports a steroid injection into the joint 2 weeks ago. This helped temporarily but the pain returned. Patient saw pain management last week and they placed a referral for sports medicine.  Patient has appointment with sports medicine on coming Wednesday.  Pain is located to anterior aspect of shoulder, increase with shoulder movements, no radiation, pain intensity varies, currently at 9/10.         Shoulder Pain   Pain location: rt shoulder. This is a chronic problem. The problem has been rapidly improving. The quality of the pain is described as aching and burning. Associated symptoms include a limited range of motion. Pertinent negatives include no fever. Treatments tried: steroid injection. The treatment provided no relief.     Constitution: Negative for fever.   Musculoskeletal:  Positive for abnormal ROM of joint.      Objective:     Physical Exam   Constitutional: She is oriented to person, place, and time. She appears well-developed. She is cooperative.   HENT:   Head: Normocephalic and atraumatic.   Ears:   Right Ear: Hearing, tympanic membrane, external ear and ear canal normal. no impacted cerumen  Left Ear: Hearing, tympanic membrane, external ear and ear canal normal. no impacted cerumen  Nose: Nose normal. No mucosal edema, rhinorrhea or nasal deformity. No epistaxis. Right sinus exhibits no maxillary sinus tenderness and no frontal sinus tenderness. Left sinus exhibits no maxillary sinus tenderness and no frontal sinus tenderness.   Mouth/Throat: Uvula is midline, oropharynx is clear and moist and mucous membranes " are normal. No trismus in the jaw. Normal dentition. No uvula swelling. No posterior oropharyngeal erythema.   Eyes: Conjunctivae and lids are normal.   Neck: Trachea normal and phonation normal. Neck supple. No neck rigidity present.   Cardiovascular: Normal rate, regular rhythm, normal heart sounds and normal pulses.   No murmur heard.  Pulmonary/Chest: Effort normal and breath sounds normal. No stridor. No respiratory distress. She has no wheezes. She has no rhonchi. She has no rales. She exhibits no tenderness.   Abdominal: Normal appearance and bowel sounds are normal. She exhibits no distension. Soft. There is no abdominal tenderness.   Musculoskeletal:      Cervical back: She exhibits no tenderness.      Comments:   Right shoulder:  No significant swelling.    No redness.  No significant warmth.  Positive TTP to anterior aspect.    ROM, mildly decreased flexion and abduction.  Neurovascular intact.   Neurological: She is alert and oriented to person, place, and time. She exhibits normal muscle tone.   Skin: Skin is warm, dry and intact.   Psychiatric: Her speech is normal and behavior is normal. Judgment and thought content normal.   Nursing note and vitals reviewed.      Assessment:     1. Right shoulder pain, unspecified chronicity        Plan:   X-ray shoulder is unremarkable.    Discussed exam findings/results/diagnosis/plan with patient. Advised to keep appointment with sports medicine next week.  ER precautions given if symptoms get any worse. All questions answered. Patient verbally understood and agreed with treatment plan.  Educational materials and instructions regarding the visit diagnosis and management provided.     Right shoulder pain, unspecified chronicity  -     X-ray Shoulder 2 or More Views Right; Future; Expected date: 02/16/2025    Other orders  -     ketorolac injection 30 mg  -     HYDROcodone-acetaminophen (NORCO) 5-325 mg per tablet; Take 1 tablet by mouth every 8 (eight) hours as  needed for Pain.  Dispense: 12 tablet; Refill: 0

## 2025-02-20 ENCOUNTER — TELEPHONE (OUTPATIENT)
Dept: SPORTS MEDICINE | Facility: CLINIC | Age: 30
End: 2025-02-20
Payer: COMMERCIAL

## 2025-02-20 NOTE — TELEPHONE ENCOUNTER
Called and spoke to patient in regards to appointment scheduled today with Dr. Carvalho.  Explained that referral was placed for Dr. Winters.  Patient states she needs to see the surgeon.  Patient appointment has been rescheduled to Dr. Winters

## 2025-02-20 NOTE — TELEPHONE ENCOUNTER
----- Message from Vijay sent at 2/20/2025  1:21 PM CST -----  Type: General Call Back Name of Caller:Ranjith the patient has an appt at 1;45 the patient states she was called by someone on  team but no message was left. can you please check and see who was trying to contact the patient because she is in route to the appt Would the patient rather a call back or a response via [facility]? Call Best Call Back Number: [phone] Information:

## 2025-02-26 ENCOUNTER — PATIENT MESSAGE (OUTPATIENT)
Dept: SPORTS MEDICINE | Facility: CLINIC | Age: 30
End: 2025-02-26

## 2025-02-26 ENCOUNTER — OFFICE VISIT (OUTPATIENT)
Dept: SPORTS MEDICINE | Facility: CLINIC | Age: 30
End: 2025-02-26
Payer: COMMERCIAL

## 2025-02-26 VITALS
BODY MASS INDEX: 21.86 KG/M2 | SYSTOLIC BLOOD PRESSURE: 122 MMHG | HEIGHT: 63 IN | DIASTOLIC BLOOD PRESSURE: 80 MMHG | HEART RATE: 65 BPM | WEIGHT: 123.38 LBS

## 2025-02-26 DIAGNOSIS — M75.21 BICEPS TENDONITIS ON RIGHT: Primary | ICD-10-CM

## 2025-02-26 DIAGNOSIS — M75.41 SHOULDER IMPINGEMENT SYNDROME, RIGHT: ICD-10-CM

## 2025-02-26 PROCEDURE — 3074F SYST BP LT 130 MM HG: CPT | Mod: CPTII,S$GLB,, | Performed by: ORTHOPAEDIC SURGERY

## 2025-02-26 PROCEDURE — 3079F DIAST BP 80-89 MM HG: CPT | Mod: CPTII,S$GLB,, | Performed by: ORTHOPAEDIC SURGERY

## 2025-02-26 PROCEDURE — 3008F BODY MASS INDEX DOCD: CPT | Mod: CPTII,S$GLB,, | Performed by: ORTHOPAEDIC SURGERY

## 2025-02-26 PROCEDURE — 99999 PR PBB SHADOW E&M-EST. PATIENT-LVL IV: CPT | Mod: PBBFAC,,, | Performed by: ORTHOPAEDIC SURGERY

## 2025-02-26 PROCEDURE — 99204 OFFICE O/P NEW MOD 45 MIN: CPT | Mod: S$GLB,,, | Performed by: ORTHOPAEDIC SURGERY

## 2025-02-26 PROCEDURE — 1159F MED LIST DOCD IN RCRD: CPT | Mod: CPTII,S$GLB,, | Performed by: ORTHOPAEDIC SURGERY

## 2025-02-26 NOTE — PROGRESS NOTES
CC: right shoulder pain     29 y.o. Female with right shoulder pain that began to significantly bother her in August 2024. She reports pain that radiates down to her 4th and 5th fingers. Reports difficulty with  strength and lifting anything.      Patient underwent AC joint image guided injection with pain management that improved her pain about 50 percent.     She reports that the pain is severe and not responding to any conservative care.      She reports that the pain is worse with overhead activity. It also bothers her at night.    Is affecting ADLs.       Review of Systems   Constitution: Negative. Negative for chills, fever and night sweats.   HENT: Negative for congestion and headaches.    Eyes: Negative for blurred vision, left vision loss and right vision loss.   Cardiovascular: Negative for chest pain and syncope.   Respiratory: Negative for cough and shortness of breath.    Endocrine: Negative for polydipsia, polyphagia and polyuria.   Hematologic/Lymphatic: Negative for bleeding problem. Does not bruise/bleed easily.   Skin: Negative for dry skin, itching and rash.   Musculoskeletal: Negative for falls and muscle weakness.   Gastrointestinal: Negative for abdominal pain and bowel incontinence.   Genitourinary: Negative for bladder incontinence and nocturia.   Neurological: Negative for disturbances in coordination, loss of balance and seizures.   Psychiatric/Behavioral: Negative for depression. The patient does not have insomnia.    Allergic/Immunologic: Negative for hives and persistent infections.     PAST MEDICAL HISTORY:   Past Medical History:   Diagnosis Date    Abnormal Pap smear of cervix     ADHD (attention deficit hyperactivity disorder)     Chronic shoulder pain     Migraine      PAST SURGICAL HISTORY:   Past Surgical History:   Procedure Laterality Date    CERVICAL BIOPSY  W/ LOOP ELECTRODE EXCISION N/A 2016    one abnormal after, clear since that time    COLONOSCOPY N/A 8/6/2024     "Procedure: COLONOSCOPY;  Surgeon: Crispin Ardon MD;  Location: [facility] ENDO (4TH FLR);  Service: Endoscopy;  Laterality: N/A;    COLPOSCOPY      ESOPHAGOGASTRODUODENOSCOPY N/A 8/6/2024    Procedure: EGD (ESOPHAGOGASTRODUODENOSCOPY);  Surgeon: Crispin Ardon MD;  Location: [facility] ENDO (4TH FLR);  Service: Endoscopy;  Laterality: N/A;  6/14/24- case length adjusted - ERW    INJECTION OF ANESTHETIC AGENT AROUND NERVE Right 1/29/2025    Procedure: INJECTION, RIGHT ACRIO CLAVICULAR JOINT;  Surgeon: Karena Davidson MD;  Location: [facility] PAIN MGT;  Service: Pain Management;  Laterality: Right;  2 wk f/u rodrick     FAMILY HISTORY:   Family History   Problem Relation Name Age of Onset    Melanoma Mother Danya     Basal cell carcinoma Mother Danya     Cancer Mother Danya     Hypertension Mother Danya     Miscarriages / Stillbirths Mother Danya     Skin cancer Brother  30    Heart attack Maternal Grandfather  60    Colon cancer Maternal Uncle  45    Arthritis Maternal Grandmother M     Heart disease Maternal Grandmother M      SOCIAL HISTORY: Social History[1]    MEDICATIONS: Current Medications[2]  ALLERGIES: Review of patient's allergies indicates:  No Known Allergies    VITAL SIGNS: /80   Pulse 65   Ht 5' 3" (1.6 m)   Wt 56 kg (123 lb 5.6 oz)   LMP 02/02/2025 (Approximate) Comment: no chance for pregnancy  BMI 21.85 kg/m²      PHYSICAL EXAMINATION:  General:  The patient is alert and oriented x 3.  Mood is pleasant.  Observation of ears, eyes and nose reveal no gross abnormalities.  No labored breathing observed.  Gait is coordinated. Patient can toe walk and heel walk without difficulty.      right SHOULDER / UPPER EXTREMITY EXAM    OBSERVATION:     Swelling  none  Deformity  none   Discoloration  none   Scapular winging none   Scars   none  Atrophy  none    TENDERNESS / CREPITUS (T/C):          T/C      T/C   Clavicle   -/-  SUPRAspinatus    -/-     AC Jt.    +/-  INFRAspinatus  -/-    SC Jt. "    -/-  Deltoid    -/-      G. Tuberosity  -/-  LH BICEP groove  +/-   Acromion:  -/-  Midline Neck   -/-     Scapular Spine -/-  Trapezium   -/-   SMA Scapula  -/-  GH jt. line - post  -/-     Scapulothoracic  -/-         ROM: (* = with pain)  Right shoulder   Left shoulder        AROM (PROM)   AROM (PROM)   FE    170° (175°)     170° (175°)     ER at 0°    60°  (65°)    60°  (65°)   ER at 90° ABD  90°  (90°)    90°  (90°)   IR at 90°  ABD   NA  (40°)     NA  (40°)      IR (spine level)   T10     T10    STRENGTH: (* = with pain) RIGHT SHOULDER  LEFT SHOULDER   SCAPTION at 0  5*/5    5/5   SCAPTION at 30  5*/5    5/5    IR    5/5    5/5   ER    5/5    5/5   BICEPS   5-*/5    5/5   Deltoid    5/5    5/5     SIGNS:  Painful side       NEER   +    OGEORGES  +    ORTIZ   +    SPEEDS  neg     DROP ARM   neg   BELLY PRESS neg   Superior escape none    LIFT-OFF  neg   X-Body ADD    neg    MOVING VALGUS neg        STABILITY TESTING    RIGHT SHOULDER   LEFT SHOULDER     Translation     Anterior  up face     up face    Posterior  up face    up face    Sulcus   < 10mm    < 10 mm     Signs   Apprehension   neg      neg       Relocation   no change     no change      Jerk test  neg     neg    EXTREMITY NEURO-VASCULAR EXAM    Sensation grossly intact to light touch all dermatomal regions.    DTR 2+ Biceps, Triceps, BR and Negative Humzas sign   Grossly intact motor function at Elbow, Wrist and Hand   Distal pulses radial and ulnar 2+, brisk cap refill, symmetric.      NECK:  Painless FROM and spinous processes non-tender. Negative Spurlings sign.      OTHER FINDINGS:  + scapular dyskinesia  - Tinels at the elbow    XRAYS reviewed and interpreted personally by me:  Shoulder trauma series right,  were obtained and reviewed  No convincing fracture or dislocation is noted. The osseous structures appear well mineralized and well aligned    MRI reviewed and interpreted personally by me::  Mild AC joint hypertrophy  No  rotator cuff or labral tear.      ASSESSMENT:   right:  AC joint pain  Possible biceps pain  Possible radiculopathy (EMG pending)    PLAN:    Refer back to pain management for AC joint / proximal biceps tendon injections  Activities as tolerated  RTC PRN  Follow up with non operative provider/spine      All questions were answered, pt will contact us for questions or concerns in the interim.    I made the decision to obtain old records of the patient including previous notes and imaging. New imaging was ordered today of the extremity or extremities evaluated. I independently reviewed and interpreted the radiographs and/or MRIs today as well as prior imaging.         [1]   Social History  Socioeconomic History    Marital status: Single   Occupational History    Occupation:      Comment: [employer]   Tobacco Use    Smoking status: Never    Smokeless tobacco: Never    Tobacco comments:     Occasional social smoking rarely   Substance and Sexual Activity    Alcohol use: Yes     Comment: occasionally    Drug use: Never    Sexual activity: Yes     Partners: Male     Birth control/protection: Condom, OCP     Social Drivers of Health     Financial Resource Strain: Low Risk  (2/24/2025)    Overall Financial Resource Strain (CARDIA)     Difficulty of Paying Living Expenses: Not very hard   Food Insecurity: No Food Insecurity (2/24/2025)    Hunger Vital Sign     Worried About Running Out of Food in the Last Year: Never true     Ran Out of Food in the Last Year: Never true   Transportation Needs: No Transportation Needs (2/24/2025)    PRAPARE - Transportation     Lack of Transportation (Medical): No     Lack of Transportation (Non-Medical): No   Physical Activity: Insufficiently Active (2/24/2025)    Exercise Vital Sign     Days of Exercise per Week: 3 days     Minutes of Exercise per Session: 40 min   Stress: Patient Declined (2/24/2025)    [place] [employer] - Occupational Stress  Questionnaire     Feeling of Stress : Patient declined   Housing Stability: Unknown (2/24/2025)    Housing Stability Vital Sign     Unable to Pay for Housing in the Last Year: Patient declined     Number of Times Moved in the Last Year: 1     Homeless in the Last Year: No   [2]   Current Outpatient Medications:     busPIRone (BUSPAR) 15 MG tablet, Take 7.5 mg by mouth 2 (two) times daily., Disp: , Rfl:     butalbital-acetaminophen-caffeine -40 mg (FIORICET, ESGIC) -40 mg per tablet, Take 1 tablet by mouth every 6 (six) hours as needed for Headaches. Nto to betaken more than 2 days out of the week., Disp: 20 tablet, Rfl: 0    gabapentin (NEURONTIN) 300 MG capsule, Take 1 capsule (300 mg total) by mouth 2 (two) times daily., Disp: 180 capsule, Rfl: 3    HYDROcodone-acetaminophen (NORCO) 5-325 mg per tablet, Take 1 tablet by mouth every 8 (eight) hours as needed for Pain., Disp: 12 tablet, Rfl: 0    methylphenidate HCl (RITALIN) 5 MG tablet, Take 5 mg by mouth 2 (two) times daily., Disp: , Rfl:     norethindrone (MICRONOR) 0.35 mg tablet, Take 1 tablet (0.35 mg total) by mouth once daily., Disp: 90 tablet, Rfl: 3    LIDOcaine-prilocaine (EMLA) cream, Apply topically as needed (Apply 45 minutes before procedure)., Disp: 30 g, Rfl: 0    medroxyPROGESTERone (PROVERA) 10 MG tablet, Take 1 tablet (10 mg total) by mouth 2 (two) times a day. for 5 days, Disp: 10 tablet, Rfl: 0

## 2025-02-26 NOTE — TELEPHONE ENCOUNTER
Jose Manuel Lozoya,     The reason we suggested a bicep tendon injection was due to the pain you were feeling in that area on our physical exam. We picked this up on our physical exam even though the MRI was negative. We are trying to help you as best we can, and the pain management team is most qualified to help with your pain control.

## 2025-03-05 ENCOUNTER — OFFICE VISIT (OUTPATIENT)
Dept: PAIN MEDICINE | Facility: CLINIC | Age: 30
End: 2025-03-05
Attending: ORTHOPAEDIC SURGERY
Payer: COMMERCIAL

## 2025-03-05 VITALS
RESPIRATION RATE: 18 BRPM | TEMPERATURE: 99 F | WEIGHT: 123.44 LBS | HEART RATE: 62 BPM | BODY MASS INDEX: 21.87 KG/M2 | DIASTOLIC BLOOD PRESSURE: 72 MMHG | OXYGEN SATURATION: 98 % | SYSTOLIC BLOOD PRESSURE: 104 MMHG

## 2025-03-05 DIAGNOSIS — M67.813 BICEPS TENDONOSIS OF RIGHT SHOULDER: Primary | ICD-10-CM

## 2025-03-05 DIAGNOSIS — M75.41 SHOULDER IMPINGEMENT SYNDROME, RIGHT: ICD-10-CM

## 2025-03-05 PROCEDURE — 3008F BODY MASS INDEX DOCD: CPT | Mod: CPTII,S$GLB,, | Performed by: ANESTHESIOLOGY

## 2025-03-05 PROCEDURE — 99999 PR PBB SHADOW E&M-EST. PATIENT-LVL IV: CPT | Mod: PBBFAC,,, | Performed by: ANESTHESIOLOGY

## 2025-03-05 PROCEDURE — 1159F MED LIST DOCD IN RCRD: CPT | Mod: CPTII,S$GLB,, | Performed by: ANESTHESIOLOGY

## 2025-03-05 PROCEDURE — 3078F DIAST BP <80 MM HG: CPT | Mod: CPTII,S$GLB,, | Performed by: ANESTHESIOLOGY

## 2025-03-05 PROCEDURE — 3074F SYST BP LT 130 MM HG: CPT | Mod: CPTII,S$GLB,, | Performed by: ANESTHESIOLOGY

## 2025-03-05 PROCEDURE — 99214 OFFICE O/P EST MOD 30 MIN: CPT | Mod: S$GLB,,, | Performed by: ANESTHESIOLOGY

## 2025-03-05 NOTE — PROGRESS NOTES
PCP: Anastasia Sung MD    REFERRING PHYSICIAN: Nicky Winters MD    CHIEF COMPLAINT: right shoulder pain     Original HISTORY OF PRESENT ILLNESS: Devora Meeks presents to the clinic for the evaluation of the above pain. The pain started many years ago    Original Pain Description:  The pain is located in the right anterior shoulder and is radiating to the right hand . The pain is described as tingling and tearing . Exacerbating factors: activities using shoulder (Writing, reaching overhead). Mitigating factors rest. Symptoms interfere with daily activity, sleeping, and work. The patient feels like symptoms have been worsening. Patient denies night fever/night sweats, urinary incontinence, bowel incontinence, significant weight loss, and loss of sensations.    She does endorse weakness in the right hand as well in regards to  strength. She states she will feel pain shooting down from medial aspect of arm to the 4th/5th digit and endorses weakness in  strength.     She feels the shoulder will sublux and she has to relocate it every morning and will send a jolt down the arm.     Original PAIN SCORES:  Best: Pain is 4  Worst: Pain is 9  Current: Pain is 4        3/5/2025     9:13 AM   Last 3 PDI Scores   Pain Disability Index (PDI) 56       INTERVAL HISTORY: (Newest visit at the bottom)   Interval History (Date):     Interval History 2/13/2025: The patient returns to clinic today for follow up of shoulder pain. She is s/p right AC joint injection on 1/29/2025. She reports 70% relief for a week. Her pain has returned to baseline. She continues to report right shoulder pain. She does have radiating pain into the arm and hand. This pain is keeping her awake   She has to pop the shoulder into place in the morning. She is currently taking Gabapentin. She denies any other health changes. Her pain today is 6/10.    Interval History 3/5/25: Ms. Meeks returns to clinic for follow-up regarding right  "shoulder pain. We previously performed a right AC joint injection with 1 month of relief. However, she returns with significant anterior shoulder pain. She feels that the shoulder is worsening, with more clicking and pain.  Reports the pain starts in the anterior shoulder and radiates into the right medial hand. Endorses dropping items with the right hand, with increased numbness/tingling first thing in the morning. Reports shoulder pain is constant and is interfering with sleep. Endorses pain with all daily activities. Reports increased "popping" in right chest wall. Patient did see orthopedic surgeon on 2/26/25, but they felt it was non-surgical and mentioned possible proximal biceps tendon injection. That concurs with our exam.     6 weeks of Conservative therapy:  PT: Attended 8 weeks in September to December 2024 (Must include dates)  Chiro: no  HEP: compliant with HEP       Treatments / Medications: (Ice/Heat/NSAIDS/APAP/etc):  Tylenol PRN  Ibuprofen PRN  Meloxicam - not taking anymore   Gabapentin 300 mg BID - partial relief (18%)    Interventional Pain Procedures: (Previous injections)  1/29/2025- Right AC joint injection- 70% relief for one month.     Past Medical History:   Diagnosis Date    Abnormal Pap smear of cervix     ADHD (attention deficit hyperactivity disorder)     Chronic shoulder pain     Migraine      Past Surgical History:   Procedure Laterality Date    CERVICAL BIOPSY  W/ LOOP ELECTRODE EXCISION N/A 2016    one abnormal after, clear since that time    COLONOSCOPY N/A 8/6/2024    Procedure: COLONOSCOPY;  Surgeon: Crispin Ardon MD;  Location: [address]);  Service: Endoscopy;  Laterality: N/A;    COLPOSCOPY      ESOPHAGOGASTRODUODENOSCOPY N/A 8/6/2024    Procedure: EGD (ESOPHAGOGASTRODUODENOSCOPY);  Surgeon: Crispin Ardon MD;  Location: [address]);  Service: Endoscopy;  Laterality: N/A;  6/14/24- case length adjusted - ERW    INJECTION OF ANESTHETIC AGENT AROUND " NERVE Right 1/29/2025    Procedure: INJECTION, RIGHT ACRIO CLAVICULAR JOINT;  Surgeon: Karena Davidson MD;  Location: [facility] PAIN MGT;  Service: Pain Management;  Laterality: Right;  2 wk f/u rodrick     Social History     Socioeconomic History    Marital status: Single   Occupational History    Occupation:      Comment: [employer]   Tobacco Use    Smoking status: Never    Smokeless tobacco: Never    Tobacco comments:     Occasional social smoking rarely   Substance and Sexual Activity    Alcohol use: Yes     Comment: occasionally    Drug use: Never    Sexual activity: Yes     Partners: Male     Birth control/protection: Condom, OCP     Social Drivers of Health     Financial Resource Strain: Low Risk  (2/24/2025)    Overall Financial Resource Strain (CARDIA)     Difficulty of Paying Living Expenses: Not very hard   Food Insecurity: No Food Insecurity (2/24/2025)    Hunger Vital Sign     Worried About Running Out of Food in the Last Year: Never true     Ran Out of Food in the Last Year: Never true   Transportation Needs: No Transportation Needs (2/24/2025)    PRAPARE - Transportation     Lack of Transportation (Medical): No     Lack of Transportation (Non-Medical): No   Physical Activity: Insufficiently Active (2/24/2025)    Exercise Vital Sign     Days of Exercise per Week: 3 days     Minutes of Exercise per Session: 40 min   Stress: Patient Declined (2/24/2025)    [place] [employer] - Occupational Stress Questionnaire     Feeling of Stress : Patient declined   Housing Stability: Unknown (2/24/2025)    Housing Stability Vital Sign     Unable to Pay for Housing in the Last Year: Patient declined     Number of Times Moved in the Last Year: 1     Homeless in the Last Year: No     Family History   Problem Relation Name Age of Onset    Melanoma Mother Danya     Basal cell carcinoma Mother Danya     Cancer Mother Danya     Hypertension Mother Danya     Miscarriages / Stillbirths Mother Danya      Skin cancer Brother  30    Heart attack Maternal Grandfather  60    Colon cancer Maternal Uncle  45    Arthritis Maternal Grandmother M     Heart disease Maternal Grandmother M        Review of patient's allergies indicates:  No Known Allergies    Current Outpatient Medications   Medication Sig    busPIRone (BUSPAR) 15 MG tablet Take 7.5 mg by mouth 2 (two) times daily.    butalbital-acetaminophen-caffeine -40 mg (FIORICET, ESGIC) -40 mg per tablet Take 1 tablet by mouth every 6 (six) hours as needed for Headaches. Nto to betaken more than 2 days out of the week.    gabapentin (NEURONTIN) 300 MG capsule Take 1 capsule (300 mg total) by mouth 2 (two) times daily.    HYDROcodone-acetaminophen (NORCO) 5-325 mg per tablet Take 1 tablet by mouth every 8 (eight) hours as needed for Pain.    LIDOcaine-prilocaine (EMLA) cream Apply topically as needed (Apply 45 minutes before procedure).    medroxyPROGESTERone (PROVERA) 10 MG tablet Take 1 tablet (10 mg total) by mouth 2 (two) times a day. for 5 days    methylphenidate HCl (RITALIN) 5 MG tablet Take 5 mg by mouth 2 (two) times daily.    norethindrone (MICRONOR) 0.35 mg tablet Take 1 tablet (0.35 mg total) by mouth once daily.     No current facility-administered medications for this visit.       ROS:  GENERAL: No fever. No chills. No fatigue. Denies weight loss. Denies weight gain.  HEENT: Denies headaches. Denies vision change. Denies eye pain. Denies double vision. Denies ear pain.   CV: Denies chest pain.   PULM: Denies of shortness of breath.  GI: Denies constipation. No diarrhea. No abdominal pain. Denies nausea. Denies vomiting. No blood in stool.  HEME: Denies bleeding problems.  : Denies urgency. No painful urination. No blood in urine.  MS: see HPI  SKIN: Denies rash.   NEURO: Denies seizures. No weakness.  PSYCH:  Denies difficulty sleeping. No anxiety. Denies depression. No suicidal thoughts.       VITALS:   Vitals:    03/05/25 0913   BP: 104/72  "  Pulse: 62   Resp: 18   Temp: 98.8 °F (37.1 °C)   SpO2: 98%   Weight: 56 kg (123 lb 7.3 oz)   PainSc:   8           PHYSICAL EXAM:   GENERAL: Well appearing, in no acute distress, alert and oriented x3.  PSYCH:  Mood and affect appropriate.  SKIN: Skin color, texture, turgor normal, no rashes or lesions.  HEENT:  Normocephalic, atraumatic. Cranial nerves grossly intact.  NECK: . No pain with neck flexion, extension, or lateral flexion.   PULM: No evidence of respiratory difficulty, symmetric chest rise.  EXTREMITIES: No deformities, edema, or skin discoloration.   MUSCULOSKELETAL:   - TTP: right biceps tendon (severe), right AC joint  - ROM: WFL  - Special Test:   - Speeds   + Yergason's    + cross arm adduction   - Empty Can   - Neers/Antoine (reported slight pain, but no comparable sign)   - Tinel's at elbow   - Durkan's at wrist  NEURO: Sensation is equal and appropriate bilaterally. Bilateral upper extremity strength is normal and symmetric EXCEPT for reduced Right Finger Abduction 4+/5 and Finger Flexion 4+/5.   GAIT: normal.    LABS:    [id number]    EXAMINATION:  MRI SHOULDER WITHOUT CONTRAST RIGHT     CLINICAL HISTORY:  Shoulder pain, adhesive capsulitis suspected, xray done;Shoulder pain, rotator cuff disorder suspected, xray done;  Pain in right shoulder     TECHNIQUE:  Multiplanar multisequence MRI examination of RIGHT shoulder.     COMPARISON:  2024.     FINDINGS:  ROTATOR CUFF:     Supraspinatus: Intact.  No tendinosis.     Infraspinatus: Intact.  No tendinosis.     Subscapularis: Intact.  No tendinosis.     Teres Minor: Intact.  No tendinosis.     There is minimal physiologic fluid within the subacromial/subdeltoid bursa.     LABRUM: Superior labrum appears grossly intact on this standard non arthrogram exam.Posterior aspect of superior labrum ( "peel-back" location) appears intact. Posterior labrum is unremarkable.Anterior inferior labrum appears intact. IGHL:Intact.     LONG HEAD " BICEPS TENDON: Located within bicipital groove and intact.Biceps-labral anchor is intact. No tendinosis.  No tenosynovitis. Rotator Interval is normal. Biceps pulley is intact.     BONES: No evident fracture.Visualized marrow within normal limits. AC joint demonstrates normal alignment with moderate hypertrophy.No significant osteo-acromial outlet narrowing (with mass effect on rotator cuff myotendinous junction) due to lateral downsloping of acromion or acromial spur.  There is no evident os acromiale.     CARTILAGE: Humeral head and glenoid cartilage preserved without focal defects. No subchondral marrow edema.  No synovial abnormality or intra-articular loose bodies. Glenoid fossa demonstrates no sclerosis.     MUSCLES:  Normal bulk and signal.     Impression:     No evidence for rotator cuff tear, labral tear, or occult osseous injury.     10/9/2024 MRA NECK WITH AND WITHOUT; MRI CERVICAL SPINE W WO CONTRAST     CLINICAL HISTORY:  possible vertebral artery insufficiency;; Neck pain, chronic;transverse ligament laxity; Cervicalgia     TECHNIQUE:  Multiplanar, multisequence MR images of the cervical spine were performed before and after the administration of 6 mL Gadavist intravenous contrast.     Obtained as a separate acquisition is a contrast enhanced MR arteriogram of the cervical vasculature with multiple MIP reconstructions.     COMPARISON:  None.     FINDINGS:  The vertebral bodies demonstrate no evidence of fracture, osseous destructive process or aggressive bone marrow replacement process.     Slight straightening of the normal cervical lordosis.  No significant spondylolisthesis     Intervertebral disk space heights are well-maintained.  Modest posterior disc osteophyte complex at C6-7.  No large disc herniation or significant spinal stenosis at any level.  No high-grade neural foraminal narrowing.     Visualized spinal cord maintains normal morphology and signal. No focal lesions.  No abnormal  postcontrast intrathecal enhancement.     No intraspinal mass or fluid collection.     No acute abnormalities in the visualized paraspinal soft tissue structures.     MRA:     Aortic arch appears within normal limits.  No significant stenosis at the major branch vessel origins.     The right common carotid arteries normal in caliber.  No significant stenosis at the carotid bifurcation.  Right internal carotid artery is normal in caliber.     The left common carotid arteries normal in caliber.  No significant stenosis at the carotid bifurcation.  The left internal carotid arteries normal in caliber.     The vertebral origins are patent.  Both vertebral arteries normal in caliber throughout their course without focal narrowing.     Limited evaluation intracranial vasculature is unremarkable.     Impression:     Modest cervical spondylosis as above, without significant spinal canal stenosis.     Unremarkable MR arteriogram of neck, without evidence of significant stenosis.       ASSESSMENT: 29 y.o. year old female with pain, consistent with:    Encounter Diagnoses   Name Primary?    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 She comes to us with right shoulder pain which is worst with activity. She had no acute trauma. CMRI normal. Shoulder MRI shows moderate hypertrophy of the right AC joint. We performed an AC joint injection with 1 month of relief and she returned with anterior shoulder pain. Exam is consistent with right biceps tendonitis, although some tenderness remained over the right AC joint. Patient also complains of right 4th/5th digit numbness/tingling, but was unable to reproduce on exam.     PLAN:  1) Previous imaging reviewed.   2) Ordered PT for right shoulder biceps tendonitis  3) Recommended Diclofenac gel to right shoulder 3-4x daily  4)  Recommended Lidocaine patch for right shoulder  5) Recommended using heat only on right shoulder as this seems to provide patient with the most relief.  6) EMG was ordered by ortho. Will evaluate when available.   7) May increase Gabapentin night-time dosage to assist with sleep  8) Schedule for right shoulder biceps tendon injection under ultrasound  9) Lots of encouragement that she will improve  10) Follow up after injection      The above plan and management options were discussed at length with patient. Patient is in agreement with the above and verbalized understanding.     Karena Davidson MD  03/05/2025

## 2025-03-05 NOTE — H&P (VIEW-ONLY)
PCP: Anastasia Sung MD    REFERRING PHYSICIAN: Nicky Winters MD    CHIEF COMPLAINT: right shoulder pain     Original HISTORY OF PRESENT ILLNESS: Devora Meeks presents to the clinic for the evaluation of the above pain. The pain started many years ago    Original Pain Description:  The pain is located in the right anterior shoulder and is radiating to the right hand . The pain is described as tingling and tearing . Exacerbating factors: activities using shoulder (Writing, reaching overhead). Mitigating factors rest. Symptoms interfere with daily activity, sleeping, and work. The patient feels like symptoms have been worsening. Patient denies night fever/night sweats, urinary incontinence, bowel incontinence, significant weight loss, and loss of sensations.    She does endorse weakness in the right hand as well in regards to  strength. She states she will feel pain shooting down from medial aspect of arm to the 4th/5th digit and endorses weakness in  strength.     She feels the shoulder will sublux and she has to relocate it every morning and will send a jolt down the arm.     Original PAIN SCORES:  Best: Pain is 4  Worst: Pain is 9  Current: Pain is 4        3/5/2025     9:13 AM   Last 3 PDI Scores   Pain Disability Index (PDI) 56       INTERVAL HISTORY: (Newest visit at the bottom)   Interval History (Date):     Interval History 2/13/2025: The patient returns to clinic today for follow up of shoulder pain. She is s/p right AC joint injection on 1/29/2025. She reports 70% relief for a week. Her pain has returned to baseline. She continues to report right shoulder pain. She does have radiating pain into the arm and hand. This pain is keeping her awake   She has to pop the shoulder into place in the morning. She is currently taking Gabapentin. She denies any other health changes. Her pain today is 6/10.    Interval History 3/5/25: Ms. Meeks returns to clinic for follow-up regarding right  "shoulder pain. We previously performed a right AC joint injection with 1 month of relief. However, she returns with significant anterior shoulder pain. She feels that the shoulder is worsening, with more clicking and pain.  Reports the pain starts in the anterior shoulder and radiates into the right medial hand. Endorses dropping items with the right hand, with increased numbness/tingling first thing in the morning. Reports shoulder pain is constant and is interfering with sleep. Endorses pain with all daily activities. Reports increased "popping" in right chest wall. Patient did see orthopedic surgeon on 2/26/25, but they felt it was non-surgical and mentioned possible proximal biceps tendon injection. That concurs with our exam.     6 weeks of Conservative therapy:  PT: Attended 8 weeks in September to December 2024 (Must include dates)  Chiro: no  HEP: compliant with HEP       Treatments / Medications: (Ice/Heat/NSAIDS/APAP/etc):  Tylenol PRN  Ibuprofen PRN  Meloxicam - not taking anymore   Gabapentin 300 mg BID - partial relief (18%)    Interventional Pain Procedures: (Previous injections)  1/29/2025- Right AC joint injection- 70% relief for one month.     Past Medical History:   Diagnosis Date    Abnormal Pap smear of cervix     ADHD (attention deficit hyperactivity disorder)     Chronic shoulder pain     Migraine      Past Surgical History:   Procedure Laterality Date    CERVICAL BIOPSY  W/ LOOP ELECTRODE EXCISION N/A 2016    one abnormal after, clear since that time    COLONOSCOPY N/A 8/6/2024    Procedure: COLONOSCOPY;  Surgeon: Crispin Ardon MD;  Location: [address]);  Service: Endoscopy;  Laterality: N/A;    COLPOSCOPY      ESOPHAGOGASTRODUODENOSCOPY N/A 8/6/2024    Procedure: EGD (ESOPHAGOGASTRODUODENOSCOPY);  Surgeon: Crispin Ardon MD;  Location: [address]);  Service: Endoscopy;  Laterality: N/A;  6/14/24- case length adjusted - ERW    INJECTION OF ANESTHETIC AGENT AROUND " NERVE Right 1/29/2025    Procedure: INJECTION, RIGHT ACRIO CLAVICULAR JOINT;  Surgeon: Karena Davidson MD;  Location: [facility];  Service: Pain Management;  Laterality: Right;  2 wk f/u rodrick     Social History     Socioeconomic History    Marital status: Single   Occupational History    Occupation:      Comment: [employer]   Tobacco Use    Smoking status: Never    Smokeless tobacco: Never    Tobacco comments:     Occasional social smoking rarely   Substance and Sexual Activity    Alcohol use: Yes     Comment: occasionally    Drug use: Never    Sexual activity: Yes     Partners: Male     Birth control/protection: Condom, OCP     Social Drivers of Health     Financial Resource Strain: Low Risk  (2/24/2025)    Overall Financial Resource Strain (CARDIA)     Difficulty of Paying Living Expenses: Not very hard   Food Insecurity: No Food Insecurity (2/24/2025)    Hunger Vital Sign     Worried About Running Out of Food in the Last Year: Never true     Ran Out of Food in the Last Year: Never true   Transportation Needs: No Transportation Needs (2/24/2025)    PRAPARE - Transportation     Lack of Transportation (Medical): No     Lack of Transportation (Non-Medical): No   Physical Activity: Insufficiently Active (2/24/2025)    Exercise Vital Sign     Days of Exercise per Week: 3 days     Minutes of Exercise per Session: 40 min   Stress: Patient Declined (2/24/2025)    [employer] - Occupational Stress Questionnaire     Feeling of Stress : Patient declined   Housing Stability: Unknown (2/24/2025)    Housing Stability Vital Sign     Unable to Pay for Housing in the Last Year: Patient declined     Number of Times Moved in the Last Year: 1     Homeless in the Last Year: No     Family History   Problem Relation Name Age of Onset    Melanoma Mother Danya     Basal cell carcinoma Mother Danya     Cancer Mother Danya     Hypertension Mother Danya     Miscarriages / Stillbirths Mother Danya      Skin cancer Brother  30    Heart attack Maternal Grandfather  60    Colon cancer Maternal Uncle  45    Arthritis Maternal Grandmother M     Heart disease Maternal Grandmother M        Review of patient's allergies indicates:  No Known Allergies    Current Outpatient Medications   Medication Sig    busPIRone (BUSPAR) 15 MG tablet Take 7.5 mg by mouth 2 (two) times daily.    butalbital-acetaminophen-caffeine -40 mg (FIORICET, ESGIC) -40 mg per tablet Take 1 tablet by mouth every 6 (six) hours as needed for Headaches. Nto to betaken more than 2 days out of the week.    gabapentin (NEURONTIN) 300 MG capsule Take 1 capsule (300 mg total) by mouth 2 (two) times daily.    HYDROcodone-acetaminophen (NORCO) 5-325 mg per tablet Take 1 tablet by mouth every 8 (eight) hours as needed for Pain.    LIDOcaine-prilocaine (EMLA) cream Apply topically as needed (Apply 45 minutes before procedure).    medroxyPROGESTERone (PROVERA) 10 MG tablet Take 1 tablet (10 mg total) by mouth 2 (two) times a day. for 5 days    methylphenidate HCl (RITALIN) 5 MG tablet Take 5 mg by mouth 2 (two) times daily.    norethindrone (MICRONOR) 0.35 mg tablet Take 1 tablet (0.35 mg total) by mouth once daily.     No current facility-administered medications for this visit.       ROS:  GENERAL: No fever. No chills. No fatigue. Denies weight loss. Denies weight gain.  HEENT: Denies headaches. Denies vision change. Denies eye pain. Denies double vision. Denies ear pain.   CV: Denies chest pain.   PULM: Denies of shortness of breath.  GI: Denies constipation. No diarrhea. No abdominal pain. Denies nausea. Denies vomiting. No blood in stool.  HEME: Denies bleeding problems.  : Denies urgency. No painful urination. No blood in urine.  MS: see HPI  SKIN: Denies rash.   NEURO: Denies seizures. No weakness.  PSYCH:  Denies difficulty sleeping. No anxiety. Denies depression. No suicidal thoughts.       VITALS:   Vitals:    03/05/25 0913   BP: 104/72  "  Pulse: 62   Resp: 18   Temp: 98.8 °F (37.1 °C)   SpO2: 98%   Weight: 56 kg (123 lb 7.3 oz)   PainSc:   8           PHYSICAL EXAM:   GENERAL: Well appearing, in no acute distress, alert and oriented x3.  PSYCH:  Mood and affect appropriate.  SKIN: Skin color, texture, turgor normal, no rashes or lesions.  HEENT:  Normocephalic, atraumatic. Cranial nerves grossly intact.  NECK: . No pain with neck flexion, extension, or lateral flexion.   PULM: No evidence of respiratory difficulty, symmetric chest rise.  EXTREMITIES: No deformities, edema, or skin discoloration.   MUSCULOSKELETAL:   - TTP: right biceps tendon (severe), right AC joint  - ROM: WFL  - Special Test:   - Speeds   + Yergason's    + cross arm adduction   - Empty Can   - Neers/Antoine (reported slight pain, but no comparable sign)   - Tinel's at elbow   - Durkan's at wrist  NEURO: Sensation is equal and appropriate bilaterally. Bilateral upper extremity strength is normal and symmetric EXCEPT for reduced Right Finger Abduction 4+/5 and Finger Flexion 4+/5.   GAIT: normal.    LABS:    [id number]    EXAMINATION:  MRI SHOULDER WITHOUT CONTRAST RIGHT     CLINICAL HISTORY:  Shoulder pain, adhesive capsulitis suspected, xray done;Shoulder pain, rotator cuff disorder suspected, xray done;  Pain in right shoulder     TECHNIQUE:  Multiplanar multisequence MRI examination of RIGHT shoulder.     COMPARISON:  2024.     FINDINGS:  ROTATOR CUFF:     Supraspinatus: Intact.  No tendinosis.     Infraspinatus: Intact.  No tendinosis.     Subscapularis: Intact.  No tendinosis.     Teres Minor: Intact.  No tendinosis.     There is minimal physiologic fluid within the subacromial/subdeltoid bursa.     LABRUM: Superior labrum appears grossly intact on this standard non arthrogram exam.Posterior aspect of superior labrum ( "peel-back" location) appears intact. Posterior labrum is unremarkable.Anterior inferior labrum appears intact. IGHL:Intact.     LONG HEAD " BICEPS TENDON: Located within bicipital groove and intact.Biceps-labral anchor is intact. No tendinosis.  No tenosynovitis. Rotator Interval is normal. Biceps pulley is intact.     BONES: No evident fracture.Visualized marrow within normal limits. AC joint demonstrates normal alignment with moderate hypertrophy.No significant osteo-acromial outlet narrowing (with mass effect on rotator cuff myotendinous junction) due to lateral downsloping of acromion or acromial spur.  There is no evident os acromiale.     CARTILAGE: Humeral head and glenoid cartilage preserved without focal defects. No subchondral marrow edema.  No synovial abnormality or intra-articular loose bodies. Glenoid fossa demonstrates no sclerosis.     MUSCLES:  Normal bulk and signal.     Impression:     No evidence for rotator cuff tear, labral tear, or occult osseous injury.     10/9/2024 MRA NECK WITH AND WITHOUT; MRI CERVICAL SPINE W WO CONTRAST     CLINICAL HISTORY:  possible vertebral artery insufficiency;; Neck pain, chronic;transverse ligament laxity; Cervicalgia     TECHNIQUE:  Multiplanar, multisequence MR images of the cervical spine were performed before and after the administration of 6 mL Gadavist intravenous contrast.     Obtained as a separate acquisition is a contrast enhanced MR arteriogram of the cervical vasculature with multiple MIP reconstructions.     COMPARISON:  None.     FINDINGS:  The vertebral bodies demonstrate no evidence of fracture, osseous destructive process or aggressive bone marrow replacement process.     Slight straightening of the normal cervical lordosis.  No significant spondylolisthesis     Intervertebral disk space heights are well-maintained.  Modest posterior disc osteophyte complex at C6-7.  No large disc herniation or significant spinal stenosis at any level.  No high-grade neural foraminal narrowing.     Visualized spinal cord maintains normal morphology and signal. No focal lesions.  No abnormal  postcontrast intrathecal enhancement.     No intraspinal mass or fluid collection.     No acute abnormalities in the visualized paraspinal soft tissue structures.     MRA:     Aortic arch appears within normal limits.  No significant stenosis at the major branch vessel origins.     The right common carotid arteries normal in caliber.  No significant stenosis at the carotid bifurcation.  Right internal carotid artery is normal in caliber.     The left common carotid arteries normal in caliber.  No significant stenosis at the carotid bifurcation.  The left internal carotid arteries normal in caliber.     The vertebral origins are patent.  Both vertebral arteries normal in caliber throughout their course without focal narrowing.     Limited evaluation intracranial vasculature is unremarkable.     Impression:     Modest cervical spondylosis as above, without significant spinal canal stenosis.     Unremarkable MR arteriogram of neck, without evidence of significant stenosis.       ASSESSMENT: 29 y.o. year old female with pain, consistent with:    Encounter Diagnoses   Name Primary?    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 She comes to us with right shoulder pain which is worst with activity. She had no acute trauma. CMRI normal. Shoulder MRI shows moderate hypertrophy of the right AC joint. We performed an AC joint injection with 1 month of relief and she returned with anterior shoulder pain. Exam is consistent with right biceps tendonitis, although some tenderness remained over the right AC joint. Patient also complains of right 4th/5th digit numbness/tingling, but was unable to reproduce on exam.     PLAN:  1) Previous imaging reviewed.   2) Ordered PT for right shoulder biceps tendonitis  3) Recommended Diclofenac gel to right shoulder 3-4x daily  4)  Recommended Lidocaine patch for right shoulder  5) Recommended using heat only on right shoulder as this seems to provide patient with the most relief.  6) EMG was ordered by ortho. Will evaluate when available.   7) May increase Gabapentin night-time dosage to assist with sleep  8) Schedule for right shoulder biceps tendon injection under ultrasound  9) Lots of encouragement that she will improve  10) Follow up after injection      The above plan and management options were discussed at length with patient. Patient is in agreement with the above and verbalized understanding.     Karena Davidson MD  03/05/2025

## 2025-03-06 ENCOUNTER — PATIENT MESSAGE (OUTPATIENT)
Dept: PAIN MEDICINE | Facility: OTHER | Age: 30
End: 2025-03-06
Payer: COMMERCIAL

## 2025-03-06 DIAGNOSIS — M75.21 BICEPS TENDINITIS OF RIGHT SHOULDER: Primary | ICD-10-CM

## 2025-03-13 ENCOUNTER — CLINICAL SUPPORT (OUTPATIENT)
Dept: REHABILITATION | Facility: OTHER | Age: 30
End: 2025-03-13
Attending: ANESTHESIOLOGY
Payer: COMMERCIAL

## 2025-03-13 DIAGNOSIS — M67.813 BICEPS TENDONOSIS OF RIGHT SHOULDER: ICD-10-CM

## 2025-03-13 DIAGNOSIS — R29.898 RIGHT ARM WEAKNESS: Primary | ICD-10-CM

## 2025-03-13 PROCEDURE — 97140 MANUAL THERAPY 1/> REGIONS: CPT | Mod: PN

## 2025-03-13 PROCEDURE — 97161 PT EVAL LOW COMPLEX 20 MIN: CPT | Mod: PN

## 2025-03-13 NOTE — PROGRESS NOTES
"    Outpatient Rehab    Physical Therapy Evaluation    Patient Name: Devora Meeks  MRN: [id number]  YOB: 1995  Encounter Date: 3/13/2025    Therapy Diagnosis:   Encounter Diagnoses   Name Primary?    Biceps tendonosis of right shoulder     Right arm weakness Yes     Physician: Karena Davidson MD    Physician Orders: Eval and Treat  Medical Diagnosis: Biceps tendonitis    Visit # / Visits Authorized:  1 / 1   Date of Evaluation:  3/13/2025   Insurance Authorization Period: 3/5/25 to 12/31/25  Plan of Care Certification:  3/13/2025 to 6/13/25      Time In: 1510   Time Out: 1630  Total Time: 80   Total Billable Time: 60    Intake Outcome Measure for FOTO Survey    Therapist reviewed FOTO scores for Devora Meeks on 3/13/2025.   FOTO report - see Media section or FOTO account episode details.     Intake Score:  %         Subjective   History of Present Illness  Devora is a 29 y.o. female who reports to physical therapy with a chief concern of R shoulder pain, R arm/ weakness.     The patient reports a medical diagnosis of R bicep tendonitis.    Diagnostic tests related to this condition: X-ray and MRI studies.   MRI Studies Details: Impression:     No evidence for rotator cuff tear, labral tear, or occult osseous injury.  X-Ray Details: The alignment is within normal limits.  No displaced fractures identified.  No evidence of lytic or blastic lesions.Joint spaces are unremarkable.Soft tissues are unremarkable.     Impression:     No evidence of fracture.No significant degenerative changes.    Dominant Hand: Right  History of Present Condition/Illness: Chronic right shoulder pain,  weakness, right upper extremity N&T. AM pain severe until the shoulder can be "popped" by creating an inferior glide of humeral head done by holding onto right knee and creating an inferior directed long axis distraction. Patient reports relief after pop. Patient also has right side sternocostal pain " at around the level of the 4th rib. Also feels like she needs to pop the rib back into place after sleeping/doing computer work.    Pain     Patient reports a current pain level of 7/10. Pain at best is reported as 3/10. Pain at worst is reported as 9/10.   Location: R anterior shoulder  Pain Qualities: Aching, Knife-like           Past Medical History/Physical Systems Review:   Devora Meeks  has a past medical history of Abnormal Pap smear of cervix, ADHD (attention deficit hyperactivity disorder), Chronic shoulder pain, and Migraine.    Devora Meeks  has a past surgical history that includes Cervical biopsy w/ loop electrode excision (N/A, 2016); Colposcopy; Colonoscopy (N/A, 8/6/2024); Esophagogastroduodenoscopy (N/A, 8/6/2024); and Injection of anesthetic agent around nerve (Right, 1/29/2025).    Devora has a current medication list which includes the following prescription(s): buspirone, butalbital-acetaminophen-caffeine -40 mg, gabapentin, hydrocodone-acetaminophen, methylphenidate hcl, and norethindrone.    Review of patient's allergies indicates:  No Known Allergies     Objective      Upper Extremity Sensation  General Upper Extremity Sensation  Impaired: Right  Right Upper Extremity Sensation  Diminished: Light Touch       Left Upper Extremity Sensation  Intact: Light Touch            Right Dermatomes  Right Cervical Dermatome Light Touch  Intact: C4  Diminished: C5, C6, C7, and C8  Right Upper Thoracic Dermatome Light Touch  Intact: T1           Subcranial Range of Motion   Active Restricted? Passive Restricted? Pain   Flexion         Protraction         Retraction           Cervical Range of Motion   Active (deg) Passive (deg) Pain   Flexion 45       Extension 55       Right Lateral Flexion 35       Right Rotation 70       Left Lateral Flexion 40       Left Rotation 70         WNL: Flexion and Extension         Shoulder Range of Motion  Right Shoulder   Active (deg) Passive  (deg) Pain   Flexion 155   Yes   Extension 45       Scaption         ABduction 160   Yes   ADduction         Horizontal ABduction         Horizontal ADduction         External Rotation (Shoulder ABducted 0 degrees)         External Rotation (Shoulder ABducted 45 degrees)         External Rotation (Shoulder ABducted 90 degrees) 90       Internal Rotation (Shoulder ABducted 0 degrees) 75       Internal Rotation (Shoulder ABducted 45 degrees)         Internal Rotation (Shoulder ABducted 90 degrees)                       Shoulder Strength - Planes of Motion   Right Strength Right Pain Left Strength Left  Pain   Flexion 3+ Yes 4+     Extension 3+   4+     ABduction 3+   4+     ADduction           Horizontal ABduction           Horizontal ADduction           Internal Rotation 0° 3+   4+     Internal Rotation 90°     4+     External Rotation 0° 3+   4+     External Rotation 90°     4+           Right  Strength  Right Hand Dynamometer Position: 2  Elbow Position Forearm Position Trial 1 (lbs) Trial 2  (lbs) Trial 3  (lbs) Average  (lbs) Pain   Flexed Neutral 44 45 43 44         Left  Strength  Left Hand Dynamometer Position: 2  Elbow Position Forearm Position Trial 1 (lbs) Trial 2 (lbs) Trial 3 (lbs) Average (lbs) Pain   Flexed Neutral 55 57 54 55.33                     Cervical Screen  Tests  Negative: CPR for Cervical Radiculopathy, Right Spurling's A, Left Spurling's A, Right ULTT2a (Median Variation), and Left ULTT2a (Median Variation)  Cervical Range of Motion  Less than 60 degrees rotation to involved side? No  Flexion WNL? Yes  Extension WNL? Yes  Thoracic Range of Motion             Cervical/Thoracic Special Tests  Deep Neck Flexor Endurance: 30 sec         Cervical Tests  Negative: Right Spurling's A and Left Spurling's A  Negative: Right ULTT1 (Median), Left ULTT1 (Median), Right ULTT2a (Median Variation), Left ULTT2a (Median Variation), Right ULTT2b (Radial), and Right ULTT3 (Ulnar)           Shoulder  Special Tests  Shoulder Stability Tests  Positive: Right Anterior Load and Shift and Right Sulcus Sign  Negative: Left Anterior Load and Shift, Right Apprehension, Left Apprehension, and Left Sulcus Sign  Shoulder Labral Tests  Positive: Right Biceps Load I and Right Biceps Load II  Positive: Right Phoenix's and Right Resisted Supine External Rotation  Rotator Cuff Tests  Positive: Right Empty Can  Negative: Left Empty Can  Positive: Right Lift Off  Negative: Right Full Can, Left Full Can, and Left Lift Off  Impingement Tests  Positive: Right Antoine-Luis and Right Neer's  Negative: Left Antoine-Luis, Left Neer's, Right Painful Arc, and Left Painful Arc  Shoulder Neural Tension Tests  Negative: Right ULTT1 (Median), Left ULTT1 (Median), Right ULTT2a (Median Variation), Left ULTT2a (Median Variation), Right ULTT2b (Radial), and Right ULTT3 (Ulnar)  Acromioclavicular Tests  Positive: Right Acromioclavicular Shear  Negative: Left Acromioclavicular Shear       Elbow Special Tests  Elbow Neural Tension Tests  Negative: Right ULTT1 (Median), Left ULTT1 (Median), Right ULTT2a (Median Variation), Left ULTT2a (Median Variation), Right ULTT2b (Radial), and Right ULTT3 (Ulnar)       Wrist/Hand Special Tests  Upper Limb Tension Tests  Negative: Right ULTT1 (Median), Left ULTT1 (Median), Right ULTT2a (Median Variation), Left ULTT2a (Median Variation), Right ULTT2b (Radial), and Right ULTT3 (Ulnar)             Shoulder Joint Mobility  Right Shoulder Mobility  Hypermobile: Sternoclavicular, Anterior Capsule Mobility, and Posterior Capsule Mobility                        Dynamometer Measurements: (taken standing with elbow at 90 degrees flexion):  Right: 44 lbs  Left: 55 lbs       Treatment:         Manual Therapy  Manual Therapy Activity 1: Application of TDN: Pt educated on benefits and potential side effects of dry needling. Educated pt on benefits, precautions, side effects following TDN. Educated pt to use heat following  treatment sessions if pt is experiencing pain or soreness. Pt verbalized good understanding of education.  Pt signed written consent to dry needling. Pt gave verbal consent for DN    Pt received dry needling to the below listed muscles using 60 mm needles.  Bilateral C5-7 multifidi    With application of electrical stimulation treatment    Pt tolerated treatment well with no adverse events noted.              Assessment & Plan   Assessment  Devora presents with a condition of Moderate complexity.   Presentation of Symptoms: Evolving       Functional Limitations: Activity tolerance    Patient Goal for Therapy (PT): decrease pain, improve R shoulder strength, return to work and rec activity without pain  Prognosis: Good  Assessment Details: Patient is a 29 year old female presenting to physical therapy with complaint of right shoulder pain and arm weakness. Patient presents with N&T at right upper extremity, right shoulder/arm weakness,  weakness and limited right upper extremity function 2nd to shoulder pain and  weakness. She has positive signs for right side labral pathology. She may benefit from an MRI with arthrogram to determine severity of labral pathology. Devora will benefit from skilled physical therapy to address current deficits and return to prior level of function.     Plan  From a physical therapy perspective, the patient would benefit from: Skilled Rehab Services    Planned therapy interventions include: Therapeutic exercise, Therapeutic activities, Neuromuscular re-education, and Manual therapy.            Visit Frequency: 1 times Per Week for 6 Weeks.       This plan was discussed with Patient.   Discussion participants: Agreed Upon Plan of Care  Plan details: Shoulder stabilization, isometrics, gentle CKC stability, joint approximation/congruity           Patient's spiritual, cultural, and educational needs considered and patient agreeable to plan of care and goals.           Goals:    Active       Functional outcome       Patient will show a significant change in FOTO patient-reported outcome tool to demonstrate subjective improvement       Start:  03/14/25    Expected End:  05/09/25            Patient will demonstrate independence in home program for support of progression       Start:  03/14/25    Expected End:  05/09/25               Lifting & carrying objects       Patient will lift/carry up to 15 lb for return to work/recreational activity without limitation.       Start:  03/14/25    Expected End:  05/09/25               Pain       Patient will report pain of 4/10 demonstrating a reduction of overall pain       Start:  03/14/25    Expected End:  05/09/25               Range of Motion       Patient will achieve right shoulder pain free flexion/abduction ROM to 170 degrees for improved functional ability.       Start:  03/14/25    Expected End:  05/09/25               Strength       Patient will demonstrate improved right shoulder strength to 4+/5 throughout.       Start:  03/14/25    Expected End:  05/09/25                Andrés Fernandez, PT

## 2025-03-18 ENCOUNTER — HOSPITAL ENCOUNTER (OUTPATIENT)
Facility: OTHER | Age: 30
Discharge: HOME | End: 2025-03-18
Attending: ANESTHESIOLOGY | Admitting: ANESTHESIOLOGY
Payer: COMMERCIAL

## 2025-03-18 VITALS
BODY MASS INDEX: 21.79 KG/M2 | HEIGHT: 63 IN | RESPIRATION RATE: 15 BRPM | SYSTOLIC BLOOD PRESSURE: 116 MMHG | OXYGEN SATURATION: 100 % | WEIGHT: 123 LBS | HEART RATE: 60 BPM | DIASTOLIC BLOOD PRESSURE: 67 MMHG | TEMPERATURE: 98 F

## 2025-03-18 DIAGNOSIS — G89.29 CHRONIC PAIN: ICD-10-CM

## 2025-03-18 DIAGNOSIS — M67.929 BICEPS TENDINOPATHY, UNSPECIFIED LATERALITY: Primary | ICD-10-CM

## 2025-03-18 PROCEDURE — 20550 NJX 1 TENDON SHEATH/LIGAMENT: CPT | Mod: RT,,, | Performed by: ANESTHESIOLOGY

## 2025-03-18 PROCEDURE — 63600175 PHARM REV CODE 636 W HCPCS: Performed by: ANESTHESIOLOGY

## 2025-03-18 PROCEDURE — 20550 NJX 1 TENDON SHEATH/LIGAMENT: CPT | Mod: RT | Performed by: ANESTHESIOLOGY

## 2025-03-18 PROCEDURE — 76942 ECHO GUIDE FOR BIOPSY: CPT | Mod: 26,,, | Performed by: ANESTHESIOLOGY

## 2025-03-18 RX ORDER — MIDAZOLAM HYDROCHLORIDE 1 MG/ML
INJECTION INTRAMUSCULAR; INTRAVENOUS
Status: DISCONTINUED | OUTPATIENT
Start: 2025-03-18 | End: 2025-03-18 | Stop reason: HOSPADM

## 2025-03-18 RX ORDER — LIDOCAINE HYDROCHLORIDE 20 MG/ML
INJECTION, SOLUTION INFILTRATION; PERINEURAL
Status: DISCONTINUED | OUTPATIENT
Start: 2025-03-18 | End: 2025-03-18 | Stop reason: HOSPADM

## 2025-03-18 RX ORDER — TRIAMCINOLONE ACETONIDE 40 MG/ML
INJECTION, SUSPENSION INTRA-ARTICULAR; INTRAMUSCULAR
Status: DISCONTINUED | OUTPATIENT
Start: 2025-03-18 | End: 2025-03-18 | Stop reason: HOSPADM

## 2025-03-18 RX ORDER — SODIUM CHLORIDE 9 MG/ML
INJECTION, SOLUTION INTRAVENOUS CONTINUOUS
Status: DISCONTINUED | OUTPATIENT
Start: 2025-03-18 | End: 2025-03-18 | Stop reason: HOSPADM

## 2025-03-18 RX ORDER — BUPIVACAINE HYDROCHLORIDE 2.5 MG/ML
INJECTION, SOLUTION EPIDURAL; INFILTRATION; INTRACAUDAL; PERINEURAL
Status: DISCONTINUED | OUTPATIENT
Start: 2025-03-18 | End: 2025-03-18 | Stop reason: HOSPADM

## 2025-03-18 NOTE — DISCHARGE INSTRUCTIONS

## 2025-03-18 NOTE — OP NOTE
PROCEDURE: Biceps tendon injection under ultrasound guidance    Pre-Op Diagnosis: RIGHT Biceps Tendonitis    Post-Op Diagnosis: Same    EBL: None    Complications: None    Specimens: None    Sedation: 2mg Versed    Surgeon: Karena Davidson MD    Assistants: [facility]      Description of procedure:    After risks and benefits were explained to the patient and consent was obtained, the patient was placed in the supine position with the area of the RIGHT shoulder exposed.  The shoulder was prepped in usual sterile fashion using chlorhexidine.Ultrasound was used to identify the area of the biceps tendon on this side.  Once identified and marked.  The ultrasound probe was then placed on the humerus and the biceps tendon was identified.  A 25 gauge needle was then passed into the area of the biceps tendon under live ultrasound guidance. Lidocaine was easily injected without pain and was seen to collect around the tendon. Then after negative aspiration for blood, and no paresthesias, a mixture of 2 mL of 0.25% bupivicaine + 1 mL 40mg/ml triamcinolone was injected again without pain and  with visualized spread of the medication into the area being visualized under ultrasound.     Patient tolerated the procedure well, there were no complications, and the patient was discharged to home.     Karena Davidson  03/18/2025

## 2025-03-18 NOTE — DISCHARGE SUMMARY
Discharge Note  Short Stay      SUMMARY     Admit Date: 3/18/2025    Attending Physician: Karena Davidson MD    Discharge Physician: Karena Davidson MD      Discharge Date: 3/18/2025 9:10 AM    Procedure(s) (LRB):  INJECTION, RIGHT BICEP TENDON UNDER ULTRASOUND (Right)    Final Diagnosis: Biceps tendinitis of right shoulder [M75.21]    Disposition: Home or self care    Patient Instructions:   Current Discharge Medication List        CONTINUE these medications which have NOT CHANGED    Details   busPIRone (BUSPAR) 15 MG tablet Take 7.5 mg by mouth 2 (two) times daily.      butalbital-acetaminophen-caffeine -40 mg (FIORICET, ESGIC) -40 mg per tablet Take 1 tablet by mouth every 6 (six) hours as needed for Headaches. Nto to betaken more than 2 days out of the week.  Qty: 20 tablet, Refills: 0    Associated Diagnoses: Intractable migraine with aura without status migrainosus      gabapentin (NEURONTIN) 300 MG capsule Take 1 capsule (300 mg total) by mouth 2 (two) times daily.  Qty: 180 capsule, Refills: 3    Associated Diagnoses: Shoulder impingement syndrome, right      HYDROcodone-acetaminophen (NORCO) 5-325 mg per tablet Take 1 tablet by mouth every 8 (eight) hours as needed for Pain.  Qty: 12 tablet, Refills: 0      methylphenidate HCl (RITALIN) 5 MG tablet Take 5 mg by mouth 2 (two) times daily.      norethindrone (MICRONOR) 0.35 mg tablet Take 1 tablet (0.35 mg total) by mouth once daily.  Qty: 90 tablet, Refills: 3    Associated Diagnoses: Menorrhagia with irregular cycle                 Discharge Diagnosis: Biceps tendinitis of right shoulder [M75.21]  Condition on Discharge: Stable with no complications to procedure   Diet on Discharge: Same as before.  Activity: as per instruction sheet.  Discharge to: Home with a responsible adult.  Follow up: 2-4 weeks       Please call my office or pager at [phone] if experienced any weakness or loss of sensation, fever > 101.5, pain uncontrolled with  oral medications, persistent nausea/vomiting/or diarrhea, redness or drainage from the incisions, or any other worrisome concerns. If physician on call was not reached or could not communicate with our office for any reason please go to the nearest emergency department     Willie Junior MD

## 2025-03-20 ENCOUNTER — CLINICAL SUPPORT (OUTPATIENT)
Dept: REHABILITATION | Facility: OTHER | Age: 30
End: 2025-03-20
Payer: COMMERCIAL

## 2025-03-20 ENCOUNTER — PATIENT MESSAGE (OUTPATIENT)
Dept: PAIN MEDICINE | Facility: OTHER | Age: 30
End: 2025-03-20
Payer: COMMERCIAL

## 2025-03-20 DIAGNOSIS — R29.898 RIGHT ARM WEAKNESS: Primary | ICD-10-CM

## 2025-03-20 PROCEDURE — 97112 NEUROMUSCULAR REEDUCATION: CPT | Mod: PN

## 2025-03-20 PROCEDURE — 97530 THERAPEUTIC ACTIVITIES: CPT | Mod: PN

## 2025-03-20 NOTE — PROGRESS NOTES
Outpatient Rehab    Physical Therapy Visit    Patient Name: Devora Meeks  MRN: [id number]  YOB: 1995  Encounter Date: 3/20/2025    Therapy Diagnosis:   Encounter Diagnosis   Name Primary?    Right arm weakness Yes     Physician: Kalpana Muñoz MD    Physician Orders: Eval and Treat  Medical Diagnosis: Vertigo    Visit # / Visits Authorized:  1 / 20  Date of Evaluation: 3/13/25  Insurance Authorization Period: 1/30/2025 to 12/31/2025  Plan of Care Certification:  1 to 1       PT/PTA:     Number of PTA visits since last PT visit:   Time In: 1530   Time Out: 1555  Total Time: 25   Total Billable Time: 25    FOTO:  Intake Score:  %  Survey Score 1:  %  Survey Score 2:  %         Subjective   Increased R shoulder pain after an CSI at R bicep tendon on 3/17/25..         Objective            Treatment:  Manual Therapy  MT 1: Rhythmic stabilization at 90 deg shoulder flexion for horizontal abd/add  MT 2: Rhythmic stabilization at 0 deg shoulder flexion for IR/ER  MT 3: PNF D2 flexion with resistance **pt unable to tolerate flex/abd above 45 deg 2nd to R shoulder pain  Balance/Neuromuscular Re-Education  NMR 1: UBE L1 2/2  NMR 2: IR/ER walk outs vs YTB 2 bands 2x10  NMR 3: Wall wipes up/down side to side 2x10    Time Entry(in minutes):  Manual Therapy Time Entry: 10  Neuromuscular Re-Education Time Entry: 15    Assessment & Plan   Assessment: Pt tolerated therapeutic interventions poorly with increased right shoulder pain during all activity. Manual resistance was tolerated fairly so we progressed that some today. We will attempt to progress exercises without increased pain or tissue irritation.       Patient will continue to benefit from skilled outpatient physical therapy to address the deficits listed in the problem list box on initial evaluation, provide pt/family education and to maximize pt's level of independence in the home and community environment.     Patient's spiritual, cultural,  and educational needs considered and patient agreeable to plan of care and goals.           Plan:      Goals:   Active       Functional outcome       Patient will show a significant change in FOTO patient-reported outcome tool to demonstrate subjective improvement       Start:  03/14/25    Expected End:  05/09/25            Patient will demonstrate independence in home program for support of progression       Start:  03/14/25    Expected End:  05/09/25               Lifting & carrying objects       Patient will lift/carry up to 15 lb for return to work/recreational activity without limitation.       Start:  03/14/25    Expected End:  05/09/25               Pain       Patient will report pain of 4/10 demonstrating a reduction of overall pain       Start:  03/14/25    Expected End:  05/09/25               Range of Motion       Patient will achieve right shoulder pain free flexion/abduction ROM to 170 degrees for improved functional ability.       Start:  03/14/25    Expected End:  05/09/25               Strength       Patient will demonstrate improved right shoulder strength to 4+/5 throughout.       Start:  03/14/25    Expected End:  05/09/25                Andrés Fernandez PT

## 2025-03-24 ENCOUNTER — TELEPHONE (OUTPATIENT)
Dept: PAIN MEDICINE | Facility: CLINIC | Age: 30
End: 2025-03-24
Payer: COMMERCIAL

## 2025-03-24 NOTE — TELEPHONE ENCOUNTER
----- Message from Summer sent at 3/24/2025  1:34 PM CDT -----  Regarding: advice  Type:  Patient Returning Call  Name of who is calling:pt  What is request in detail:pt is requesting a call back in regards to advice, pt had a procedure last Tuesday and fell Saturday is in extreme pain. Pt wants to know if she need to be seen, or wants to know can she take. Please assist.  Can clinic reply by [facility]:no  What number to call back if not in [facility]: [phone]

## 2025-03-25 ENCOUNTER — OFFICE VISIT (OUTPATIENT)
Dept: SPORTS MEDICINE | Facility: CLINIC | Age: 30
End: 2025-03-25
Payer: COMMERCIAL

## 2025-03-25 ENCOUNTER — CLINICAL SUPPORT (OUTPATIENT)
Dept: REHABILITATION | Facility: OTHER | Age: 30
End: 2025-03-25
Payer: COMMERCIAL

## 2025-03-25 VITALS
HEART RATE: 58 BPM | SYSTOLIC BLOOD PRESSURE: 151 MMHG | WEIGHT: 124.31 LBS | BODY MASS INDEX: 22.03 KG/M2 | HEIGHT: 63 IN | DIASTOLIC BLOOD PRESSURE: 84 MMHG

## 2025-03-25 DIAGNOSIS — R29.898 RIGHT ARM WEAKNESS: Primary | ICD-10-CM

## 2025-03-25 DIAGNOSIS — M25.311 INSTABILITY OF RIGHT SHOULDER JOINT: Primary | ICD-10-CM

## 2025-03-25 DIAGNOSIS — G43.119 INTRACTABLE MIGRAINE WITH AURA WITHOUT STATUS MIGRAINOSUS: ICD-10-CM

## 2025-03-25 PROCEDURE — 97140 MANUAL THERAPY 1/> REGIONS: CPT | Mod: PN

## 2025-03-25 PROCEDURE — 3079F DIAST BP 80-89 MM HG: CPT | Mod: CPTII,S$GLB,, | Performed by: ORTHOPAEDIC SURGERY

## 2025-03-25 PROCEDURE — 1160F RVW MEDS BY RX/DR IN RCRD: CPT | Mod: CPTII,S$GLB,, | Performed by: ORTHOPAEDIC SURGERY

## 2025-03-25 PROCEDURE — 99999 PR PBB SHADOW E&M-EST. PATIENT-LVL III: CPT | Mod: PBBFAC,,, | Performed by: ORTHOPAEDIC SURGERY

## 2025-03-25 PROCEDURE — 99214 OFFICE O/P EST MOD 30 MIN: CPT | Mod: S$GLB,,, | Performed by: ORTHOPAEDIC SURGERY

## 2025-03-25 PROCEDURE — 3008F BODY MASS INDEX DOCD: CPT | Mod: CPTII,S$GLB,, | Performed by: ORTHOPAEDIC SURGERY

## 2025-03-25 PROCEDURE — 1159F MED LIST DOCD IN RCRD: CPT | Mod: CPTII,S$GLB,, | Performed by: ORTHOPAEDIC SURGERY

## 2025-03-25 PROCEDURE — 3077F SYST BP >= 140 MM HG: CPT | Mod: CPTII,S$GLB,, | Performed by: ORTHOPAEDIC SURGERY

## 2025-03-25 RX ORDER — BUTALBITAL, ACETAMINOPHEN AND CAFFEINE 50; 325; 40 MG/1; MG/1; MG/1
1 TABLET ORAL EVERY 6 HOURS PRN
Qty: 20 TABLET | Refills: 2 | Status: SHIPPED | OUTPATIENT
Start: 2025-03-25

## 2025-03-25 RX ORDER — DIAZEPAM 5 MG/1
10 TABLET ORAL ONCE
Qty: 2 TABLET | Refills: 0 | Status: SHIPPED | OUTPATIENT
Start: 2025-03-25 | End: 2025-03-25

## 2025-03-25 NOTE — PROGRESS NOTES
Outpatient Rehab    Physical Therapy Visit    Patient Name: Devora Meeks  MRN: [id number]  YOB: 1995  Encounter Date: 3/25/2025    Therapy Diagnosis:   Encounter Diagnosis   Name Primary?    Right arm weakness Yes     Physician: Kalpana Muñoz MD    Physician Orders: Eval and Treat  Medical Diagnosis: Vertigo    Visit # / Visits Authorized:  2 / 20  Date of Evaluation: 3/13/25  Insurance Authorization Period: 1/30/2025 to 12/31/2025  Plan of Care Certification:  1 to 1       PT/PTA:     Number of PTA visits since last PT visit:   Time In: 1530   Time Out: 1600  Total Time: 30   Total Billable Time: 25    FOTO:  Intake Score:  %  Survey Score 1:  %  Survey Score 2:  %         Subjective             Objective            Treatment:  Manual Therapy  MT 1: Rhythmic stabilization at 90 deg shoulder flexion for horizontal abd/add  MT 2: Rhythmic stabilization at 0 deg shoulder flexion for IR/ER  MT 3: PNF D2 flexion with resistance **pt unable to tolerate flex/abd above 45 deg 2nd to R shoulder pain  Balance/Neuromuscular Re-Education  NMR 2: IR/ER walk outs vs YTB 2 bands 2x10    Time Entry(in minutes):  Manual Therapy Time Entry: 20  Neuromuscular Re-Education Time Entry: 5    Assessment & Plan   Assessment: Pt continue to tolerate therapeutic interventions poorly with increased right shoulder pain during all activity. Manual resistance was tolerated fairly so we progressed that some today. We will continue to progress as tolerated.       Patient will continue to benefit from skilled outpatient physical therapy to address the deficits listed in the problem list box on initial evaluation, provide pt/family education and to maximize pt's level of independence in the home and community environment.     Patient's spiritual, cultural, and educational needs considered and patient agreeable to plan of care and goals.           Plan:      Goals:   Active       Functional outcome       Patient will  show a significant change in FOTO patient-reported outcome tool to demonstrate subjective improvement       Start:  03/14/25    Expected End:  05/09/25            Patient will demonstrate independence in home program for support of progression       Start:  03/14/25    Expected End:  05/09/25               Lifting & carrying objects       Patient will lift/carry up to 15 lb for return to work/recreational activity without limitation.       Start:  03/14/25    Expected End:  05/09/25               Pain       Patient will report pain of 4/10 demonstrating a reduction of overall pain       Start:  03/14/25    Expected End:  05/09/25               Range of Motion       Patient will achieve right shoulder pain free flexion/abduction ROM to 170 degrees for improved functional ability.       Start:  03/14/25    Expected End:  05/09/25               Strength       Patient will demonstrate improved right shoulder strength to 4+/5 throughout.       Start:  03/14/25    Expected End:  05/09/25                Andrés Fernandez PT

## 2025-03-25 NOTE — PROGRESS NOTES
CC: RIGHT shoulder pain     29 y.o. Female with a 7 month history of right shoulder atraumatic pain. She has been seeing pain management as well as PT for the pain. She reports no history of antecedent trauma. No past history of surgery on the right shoulder.    She states that the pain is severe and not responding to any conservative care.      She reports that the pain and weakness is worse with overhead activity. It also bothers her at night.    Is affecting ADLs.  Pain is 10/10 at it's worst.      Past Medical History:   Diagnosis Date    Abnormal Pap smear of cervix     ADHD (attention deficit hyperactivity disorder)     Chronic shoulder pain     Migraine        Past Surgical History:   Procedure Laterality Date    CERVICAL BIOPSY  W/ LOOP ELECTRODE EXCISION N/A 2016    one abnormal after, clear since that time    COLONOSCOPY N/A 8/6/2024    Procedure: COLONOSCOPY;  Surgeon: Crispin Ardon MD;  Location: [facility] ([address] [place] [address]);  Service: Endoscopy;  Laterality: N/A;    COLPOSCOPY      ESOPHAGOGASTRODUODENOSCOPY N/A 8/6/2024    Procedure: EGD (ESOPHAGOGASTRODUODENOSCOPY);  Surgeon: Crispin Ardon MD;  Location: [facility] ([address] [place] [address]);  Service: Endoscopy;  Laterality: N/A;  6/14/24- case length adjusted - ERW    INJECTION OF ANESTHETIC AGENT AROUND NERVE Right 1/29/2025    Procedure: INJECTION, RIGHT ACRIO CLAVICULAR JOINT;  Surgeon: Karena Davidson MD;  Location: [facility] PAIN MGT;  Service: Pain Management;  Laterality: Right;  2 wk f/u rodrick    INJECTION OF ANESTHETIC AGENT AROUND NERVE Right 3/18/2025    Procedure: INJECTION, RIGHT BICEP TENDON UNDER ULTRASOUND;  Surgeon: Karena Davidson MD;  Location: [facility] PAIN MGT;  Service: Pain Management;  Laterality: Right;  2 WK F/U RODRICK       Family History   Problem Relation Name Age of Onset    Melanoma Mother Danya     Basal cell carcinoma Mother Danya     Cancer Mother Danya     Hypertension Mother Danya     Miscarriages / Stillbirths Mother Danya     Skin cancer  "Brother  30    Heart attack Maternal Grandfather  60    Colon cancer Maternal Uncle  45    Arthritis Maternal Grandmother M     Heart disease Maternal Grandmother M        Current Medications[1]    Review of patient's allergies indicates:  No Known Allergies       REVIEW OF SYSTEMS:  Constitution: Negative. Negative for chills, fever and night sweats.   HENT: Negative for congestion and headaches.    Eyes: Negative for blurred vision, left vision loss and right vision loss.   Cardiovascular: Negative for chest pain and syncope.   Respiratory: Negative for cough and shortness of breath.    Endocrine: Negative for polydipsia, polyphagia and polyuria.   Hematologic/Lymphatic: Negative for bleeding problem. Does not bruise/bleed easily.   Skin: Negative for dry skin, itching and rash.   Musculoskeletal: Negative for falls.  Positive for right shoulder pain and muscle weakness.   Gastrointestinal: Negative for abdominal pain and bowel incontinence.   Genitourinary: Negative for bladder incontinence and nocturia.   Neurological: Negative for disturbances in coordination, loss of balance and seizures.   Psychiatric/Behavioral: Negative for depression. The patient does not have insomnia.    Allergic/Immunologic: Negative for hives and persistent infections.      PHYSICAL EXAMINATION:  Vitals:  BP (!) 151/84   Pulse (!) 58   Ht 5' 3" (1.6 m)   Wt 56.4 kg (124 lb 5.4 oz)   BMI 22.03 kg/m²    General: The patient is alert and oriented x 3.  Mood is pleasant.  Observation of ears, eyes and nose reveal no gross abnormalities.  No labored breathing observed.  Gait is coordinated. Patient can toe walk and heel walk without difficulty.      RIGHT SHOULDER / UPPER EXTREMITY EXAM    OBSERVATION:     Swelling  none  Deformity  none   Discoloration  none   Scapular winging none   Scars   none  Atrophy  none    TENDERNESS / CREPITUS (T/C):          T/C      T/C   Clavicle   -/-  SUPRAspinatus    -/-     AC Jt. "    -/-  INFRAspinatus  -/-    SC Jt.    -/-  Deltoid    -/-      G. Tuberosity  -/-  LH BICEP groove  -/-   Acromion:  -/-  Midline Neck   -/-     Scapular Spine -/-  Trapezium   -/-   SMA Scapula  -/-  GH jt. line - post  -/-     Scapulothoracic  -/-         ROM: (* = with pain)  Left shoulder   Right shoulder        AROM (PROM)   AROM (PROM)   FE    170° (175°)     *170° (175°)     ER at 0°    60°  (65°)    *60°  (65°)   ER at 90° ABD  90°  (90°)    *90°  (90°)   IR at 90°  ABD   NA  (40°)     *NA  (40°)      IR (spine level)   T10     *T10    STRENGTH: (* = with pain) Left shoulder   Right shoulder   SCAPTION   5/5    *5/5    IR    5/5    *5/5   ER    5/5    *5/5   BICEPS   5/5    *5/5   Deltoid    5/5    *5/5     SIGNS:  Painful side       NEER   -    OGEORGES  +    ORTIZ   -    SPEEDS  +     DROP ARM   -   BELLY PRESS neg   Superior escape none    LIFT-OFF  neg   X-Body ADD    neg    MOVING VALGUS neg        STABILITY TESTING    Left shoulder   Right shoulder    Translation     Anterior  up face     up face    Posterior  up face    up face    Sulcus   < 10mm    < 10 mm     Signs   Apprehension   neg      +       Relocation   no change     change      Jerk test  neg     +    EXTREMITY NEURO-VASCULAR EXAM:    Sensation grossly intact to light touch all dermatomal regions.    DTR 2+ Biceps, Triceps, BR and Negative Humzas sign   Grossly intact motor function at Elbow, Wrist and Hand   Distal pulses radial and ulnar 2+, brisk cap refill, symmetric.      NECK:  Painless FROM and spinous processes non-tender. Negative Spurlings sign.      OTHER FINDINGS:  - scapular dyskinesia    XRAYS:  Xrays including AP, Outlet and Axillary Lateral of shoulder are ordered / images reviewed by me:   No fracture dislocation or other pathology   Acromion type 2   Proximal migration of humeral head: None   GH arthritis: None          ASSESSMENT:   Right shoulder pain:  1. Instability of right shoulder joint        PLAN:       1. MRA to rule out labrum tear  2. Follow up in clinic to discuss MRI results    All questions were answered, patient will contact us for questions or concerns in the interim.             [1]   Current Outpatient Medications:     busPIRone (BUSPAR) 15 MG tablet, Take 7.5 mg by mouth 2 (two) times daily., Disp: , Rfl:     butalbital-acetaminophen-caffeine -40 mg (FIORICET, ESGIC) -40 mg per tablet, Take 1 tablet by mouth every 6 (six) hours as needed for Headaches. Nto to betaken more than 2 days out of the week., Disp: 20 tablet, Rfl: 2    gabapentin (NEURONTIN) 300 MG capsule, Take 1 capsule (300 mg total) by mouth 2 (two) times daily., Disp: 180 capsule, Rfl: 3    methylphenidate HCl (RITALIN) 5 MG tablet, Take 5 mg by mouth 2 (two) times daily., Disp: , Rfl:     norethindrone (MICRONOR) 0.35 mg tablet, Take 1 tablet (0.35 mg total) by mouth once daily., Disp: 90 tablet, Rfl: 3    diazePAM (VALIUM) 5 MG tablet, Take 2 tablets (10 mg total) by mouth once. May repeat if necessary for 1 dose, Disp: 2 tablet, Rfl: 0    HYDROcodone-acetaminophen (NORCO) 5-325 mg per tablet, Take 1 tablet by mouth every 8 (eight) hours as needed for Pain. (Patient not taking: Reported on 3/25/2025), Disp: 12 tablet, Rfl: 0

## 2025-04-02 ENCOUNTER — PROCEDURE VISIT (OUTPATIENT)
Dept: NEUROLOGY | Facility: CLINIC | Age: 30
End: 2025-04-02
Payer: COMMERCIAL

## 2025-04-02 DIAGNOSIS — R20.2 NUMBNESS AND TINGLING OF RIGHT ARM: ICD-10-CM

## 2025-04-02 DIAGNOSIS — M75.41 SHOULDER IMPINGEMENT SYNDROME, RIGHT: ICD-10-CM

## 2025-04-02 DIAGNOSIS — R20.0 NUMBNESS AND TINGLING OF RIGHT ARM: ICD-10-CM

## 2025-04-02 PROCEDURE — 95912 NRV CNDJ TEST 11-12 STUDIES: CPT | Mod: S$GLB,,, | Performed by: PHYSICAL MEDICINE & REHABILITATION

## 2025-04-02 PROCEDURE — 95886 MUSC TEST DONE W/N TEST COMP: CPT | Mod: S$GLB,,, | Performed by: PHYSICAL MEDICINE & REHABILITATION

## 2025-04-02 NOTE — PROCEDURES
Test Date:  2025    Patient: michael meeks : 1995 Physician: Daniel Guadalupe D.O.   ID#: [id number] SEX: Female Ref. Phys: Brandi Martinez NP     HPI: Michael Meeks is a 29 y.o.female who presents for NCS/EMG to evaluate for right brachial plexopathy.  Limited left NCS checked for comparison.    PROCEDURE:  Prior to the procedure, the procedure was discussed in detail with the patient.  All questions were answered, and verbal consent was obtained.  For nerve conduction studies, a combination of surface electrodes, bar electrodes, and/or ring electrodes were used as needed.  For needle EMG, each site was cleaned and prepped in usual fashion with an alcohol pad.  A monopolar needle (28G) was used.  There was no significant bleeding, and bandages were applied as needed.  The procedure was tolerated without adverse effect.  The patient was instructed on post-procedure care including ice if needed for 10-15 minutes up to 4 times/day for any sore muscles.  I discussed with the patient that the data would be reviewed and a report sent to the referring provider, where any follow up questions regarding next steps should be directed.        NCV & EMG Findings:  All nerve conduction studies (as indicated in the following tables) were within normal limits.  All examined muscles (as indicated in the following table) showed no evidence of electrical instability.      IMPRESSIONS:  This is a normal electrophysiologic study of the right upper extremity.  There is no electrodiagnostic evidence of a brachial plexopathy.            ___________________________  Daniel Guadalupe D.O.        NCS+  Motor Nerve Results      Latency Amplitude F-Lat Segment Distance CV Comment   Site (ms) Norm (mV) Norm (ms)  (cm) (m/s) Norm    Left Median (APB)   Wrist 3.1  < 4.4 12.0  > 5.9         Elbow 6.5 - 6.5 -  Elbow-Wrist 20 59  > 53    Right Median (APB)   Wrist 3.0  < 4.4 12.0  > 5.9         Elbow 6.6 - 7.3 -   Elbow-Wrist 21 58  > 53    Left Ulnar (ADM)   Wrist 2.4  < 3.7 8.7  > 3.0         Bel Elbow 5.9 - 10.0 -  Bel Elbow-Wrist 24 69  > 52    Right Ulnar (ADM)   Wrist 2.4  < 3.7 11.6  > 3.0         Bel Elbow 6.0 - 11.8 -  Bel Elbow-Wrist 23 64  > 52    Abv Elbow 7.2 - 9.2 -  Abv Elbow-Bel Elbow 8 67  > 43      Sensory Nerve Results      Start Lat Latency (Peak) Amplitude (P-P) Segment Distance Start CV Comment   Site (ms) Norm (ms) (µV) Norm  (cm) (m/s) Norm    Left Median (Rec:Dig II)   Wrist 2.3  < 3.3 3.2 167  > 19 Wrist-Dig II 14 61  > 42    Right Median (Rec:Dig II)   Wrist 2.3  < 3.3 3.1 139  > 19 Wrist-Dig II 14 61  > 42    Left Ulnar (Rec:Dig V)   Wrist 1.95  < 3.1 2.7 177  > 13 Wrist-Dig V 14 72  > 45    Right Ulnar (Rec:Dig V)   Wrist 1.83  < 3.1 2.6 164  > 13 Wrist-Dig V 14 77  > 45    Right Dorsal Ulnar Cutaneous (Rec:Dorsum 5th MC)   Wrist 1.28 - 1.75 50 - Wrist-Dorsum 5th MC - - -    Right Radial (Rec:Wrist)   Forearm 0.93  < 2.2 1.40 81  > 11 Forearm-Wrist 10 108 -    Right Lateral Antebrachial Cutaneous (Rec:Lat Forearm)   Lat Biceps 1.13 - 1.50 51  > 6 Lat Biceps-Lat Forearm 10 88 -      EMG+     Side Muscle Nerve Root Ins Act Fibs Psw Amp Dur Poly Recrt Int Pat Comment   Right Deltoid Axillary C5-C6 Nml Nml Nml Nml Nml 0 Nml Nml    Right Biceps Musculocut C5-C6 Nml Nml Nml Nml Nml 0 Nml Nml    Right Triceps Radial C6-C8 Nml Nml Nml Nml Nml 0 Nml Nml    Right Pronator Teres Median C6-C7 Nml Nml Nml Nml Nml 0 Nml Nml    Right FDI Ulnar C8-T1 Nml Nml Nml Nml Nml 0 Nml Nml            Waveforms:    Motor              Sensory

## 2025-04-07 ENCOUNTER — HOSPITAL ENCOUNTER (OUTPATIENT)
Dept: RADIOLOGY | Facility: HOSPITAL | Age: 30
Discharge: HOME | End: 2025-04-07
Attending: ORTHOPAEDIC SURGERY
Payer: COMMERCIAL

## 2025-04-07 DIAGNOSIS — M25.311 INSTABILITY OF RIGHT SHOULDER JOINT: ICD-10-CM

## 2025-04-07 PROCEDURE — 63600175 PHARM REV CODE 636 W HCPCS: Performed by: ORTHOPAEDIC SURGERY

## 2025-04-07 PROCEDURE — 25500020 PHARM REV CODE 255: Performed by: ORTHOPAEDIC SURGERY

## 2025-04-07 PROCEDURE — 73222 MRI JOINT UPR EXTREM W/DYE: CPT | Mod: TC,RT

## 2025-04-07 PROCEDURE — 73222 MRI JOINT UPR EXTREM W/DYE: CPT | Mod: 26,RT,, | Performed by: RADIOLOGY

## 2025-04-07 PROCEDURE — A9585 GADOBUTROL INJECTION: HCPCS | Performed by: ORTHOPAEDIC SURGERY

## 2025-04-07 RX ORDER — GADOBUTROL 604.72 MG/ML
7 INJECTION INTRAVENOUS
Status: COMPLETED | OUTPATIENT
Start: 2025-04-07 | End: 2025-04-07

## 2025-04-07 RX ORDER — LIDOCAINE HYDROCHLORIDE 10 MG/ML
3 INJECTION, SOLUTION INFILTRATION; PERINEURAL ONCE
Status: COMPLETED | OUTPATIENT
Start: 2025-04-07 | End: 2025-04-07

## 2025-04-07 RX ADMIN — IOHEXOL 8 ML: 300 INJECTION, SOLUTION INTRAVENOUS at 03:04

## 2025-04-07 RX ADMIN — GADOBUTROL 7 ML: 604.72 INJECTION INTRAVENOUS at 03:04

## 2025-04-07 RX ADMIN — LIDOCAINE HYDROCHLORIDE 3 ML: 10 INJECTION, SOLUTION INFILTRATION; PERINEURAL at 03:04

## 2025-04-08 ENCOUNTER — PATIENT MESSAGE (OUTPATIENT)
Dept: SPORTS MEDICINE | Facility: CLINIC | Age: 30
End: 2025-04-08
Payer: COMMERCIAL

## 2025-04-11 ENCOUNTER — TELEPHONE (OUTPATIENT)
Dept: SPORTS MEDICINE | Facility: CLINIC | Age: 30
End: 2025-04-11
Payer: COMMERCIAL

## 2025-04-11 ENCOUNTER — OFFICE VISIT (OUTPATIENT)
Dept: SPORTS MEDICINE | Facility: CLINIC | Age: 30
End: 2025-04-11
Payer: COMMERCIAL

## 2025-04-11 DIAGNOSIS — M25.511 CHRONIC RIGHT SHOULDER PAIN: ICD-10-CM

## 2025-04-11 DIAGNOSIS — M25.311 INSTABILITY OF RIGHT SHOULDER JOINT: Primary | ICD-10-CM

## 2025-04-11 DIAGNOSIS — G89.29 CHRONIC RIGHT SHOULDER PAIN: ICD-10-CM

## 2025-04-11 NOTE — PROGRESS NOTES
Telemedicine/Virtual Visit Documentation:     The patient location is: home    The chief complaint leading to consultation is: see HPI    VISIT TYPE X   Virtual visit with synchronous audio and video x   Telephone E/M service      Total time spent with patient: see X didi on chart below.   More than half of the time was spent counseling or coordinating care including prognosis, differential diagnosis, risks and benefits of treatment, instructions, compliance risk reductions     EST MINUTES X   [id number] 5    [id number] 10    08498 15 x   99214 25    [id number] 40    NEW     86741 10    61069 20    91948 30    57794 45    60867 60    PHONE      5-10    70079 11-20    93466 21-30      H&P  Orthopaedics      SUBJECTIVE:     History of Present Illness:    Patient is a 29-year-old female who presents today via virtual telemedicine visit for follow-up evaluation of right shoulder pain.  Patient reports a 7-8 month period of atraumatic, gradually worsening right shoulder pain and subjective instability.  No history of dislocations, however she does report shoulder subluxations in the morning that she has to reduce on her own.  The only injury to the shoulder that she can recall was a fall 6-7 years ago which primarily affected her right hand.  Treatment thus far has included formal physical therapy, biceps tendon sheath an AC joint corticosteroid injections, and anti-inflammatory medication with very little relief.  She recently had an EMG and nerve conduction study which was negative for brachial plexopathy/upper extremity neuropathy.  Recently underwent an MRI arthrogram of the right shoulder, and is here today to discuss diagnosis and treatment moving forward.        She reports that the pain and weakness is worse with overhead activity. It also bothers her at night.       Review of patient's allergies indicates:  No Known Allergies    Past Medical History:   Diagnosis Date    Abnormal Pap smear of cervix     ADHD (attention  deficit hyperactivity disorder)     Chronic shoulder pain     Migraine      Past Surgical History:   Procedure Laterality Date    CERVICAL BIOPSY  W/ LOOP ELECTRODE EXCISION N/A 2016    one abnormal after, clear since that time    COLONOSCOPY N/A 8/6/2024    Procedure: COLONOSCOPY;  Surgeon: Crispin Ardon MD;  Location: [facility] (4TH FLR);  Service: Endoscopy;  Laterality: N/A;    COLPOSCOPY      ESOPHAGOGASTRODUODENOSCOPY N/A 8/6/2024    Procedure: EGD (ESOPHAGOGASTRODUODENOSCOPY);  Surgeon: Crispin Ardon MD;  Location: [facility] ENDO (4TH FLR);  Service: Endoscopy;  Laterality: N/A;  6/14/24- case length adjusted - ERW    INJECTION OF ANESTHETIC AGENT AROUND NERVE Right 1/29/2025    Procedure: INJECTION, RIGHT ACRIO CLAVICULAR JOINT;  Surgeon: Karena Davidson MD;  Location: [facility] PAIN MGT;  Service: Pain Management;  Laterality: Right;  2 wk f/u rodrick    INJECTION OF ANESTHETIC AGENT AROUND NERVE Right 3/18/2025    Procedure: INJECTION, RIGHT BICEP TENDON UNDER ULTRASOUND;  Surgeon: Karena Davidson MD;  Location: [facility] PAIN MGT;  Service: Pain Management;  Laterality: Right;  2 WK F/U RODRICK     Family History   Problem Relation Name Age of Onset    Melanoma Mother Danya     Basal cell carcinoma Mother Danya     Cancer Mother Danya     Hypertension Mother Danya     Miscarriages / Stillbirths Mother Danya     Skin cancer Brother  30    Heart attack Maternal Grandfather  60    Colon cancer Maternal Uncle  45    Arthritis Maternal Grandmother M     Heart disease Maternal Grandmother M      Social History[1]     Review of Systems:  Patient denies constitutional symptoms, cardiac symptoms, respiratory symptoms, GI symptoms.  The remainder of the musculoskeletal ROS is included in the HPI.      OBJECTIVE:     Physical Exam:  Gen:  No acute distress  CV:  Peripherally well-perfused.   Lungs:  Normal respiratory effort.  Head/Neck:  Normocephalic.  Atraumatic. AROM  intact without pain  Neuro:  CN intact without deficit,  SILT throughout B/L Upper & Lower Extremities    MSK:  No musculoskeletal exam was performed during this virtual visit.    EMG with nerve conduction study (04/02/2025):  NCV & EMG Findings:  All nerve conduction studies (as indicated in the following tables) were within normal limits.  All examined muscles (as indicated in the following table) showed no evidence of electrical instability.        IMPRESSIONS:  This is a normal electrophysiologic study of the right upper extremity.  There is no electrodiagnostic evidence of a brachial plexopathy.        XRAYS:  Xrays including AP, Outlet and Axillary Lateral of shoulder are ordered / images reviewed by me:              No fracture dislocation or other pathology              Acromion type 2              Proximal migration of humeral head: None              GH arthritis: None    MRI right shoulder (04/07/2025):  FINDINGS:  ROTATOR CUFF: Intact     LABRUM: Normal morphology and signal intensity.  No paralabral cyst.     BICEPS: Normal contour and signal intensity.     BONES: No fractures.  No avascular necrosis.  No infiltrative process.     AC JOINT: Unremarkable.     CARTILAGE: Intact without partial or full-thickness defects.     MISCELLANEOUS: Subacromial/subdeltoid bursa intact.  Superior, middle and inferior glenohumeral ligaments are normal.  Coracohumeral ligament is normal.  No axillary lymphadenopathy.     Impression:     Normal study    ASSESSMENT/PLAN:     Assessment:  1. Instability of right shoulder joint        2. Chronic right shoulder pain              Plan:    I made the decision to obtain old records of the patient including previous notes and imaging. I independently reviewed and interpreted the radiographs and/or MRIs today as well as prior imaging. Reviewed MRI and radiograph results with patient in detail.    We discussed at length different treatment options including conservative vs surgical management. These include anti-inflammatories,  acetaminophen, rest, ice, heat, formal physical therapy including strengthening and stretching exercises, home exercise programs, dry needling, corticosteroid injections, and finally surgical intervention.      Patient is leaning towards surgical intervention as she has failed conservative treatment.  I will touch base with Dr. Sotelo to discuss surgical plan which may include a diagnostic arthroscopy and possible capsulorrhaphy/labral repair.    We will call her for future scheduling and potential surgical planning.      All questions were answered. Instructed patient to call with questions or concerns in the interim.       Medical Dictation software was used during the dictation of portions or the entirety of this medical record.  Phonetic or grammatic errors may exist due to the use of this software. For clarification, refer to the author of the document.             [1]   Social History  Tobacco Use    Smoking status: Never    Smokeless tobacco: Never    Tobacco comments:     Occasional social smoking rarely   Substance Use Topics    Alcohol use: Yes     Comment: occasionally    Drug use: Never

## 2025-04-15 ENCOUNTER — CLINICAL SUPPORT (OUTPATIENT)
Dept: REHABILITATION | Facility: OTHER | Age: 30
End: 2025-04-15
Payer: COMMERCIAL

## 2025-04-15 DIAGNOSIS — R29.898 RIGHT ARM WEAKNESS: Primary | ICD-10-CM

## 2025-04-15 PROCEDURE — 97140 MANUAL THERAPY 1/> REGIONS: CPT | Mod: PN

## 2025-04-15 PROCEDURE — 97112 NEUROMUSCULAR REEDUCATION: CPT | Mod: PN

## 2025-04-15 NOTE — PROGRESS NOTES
"      Outpatient Rehab    Physical Therapy Visit    Patient Name: Devora Meeks  MRN: [id number]  YOB: 1995  Encounter Date: 4/15/2025    Therapy Diagnosis:   Encounter Diagnosis   Name Primary?    Right arm weakness Yes     Physician: Kalpana Muñoz MD    Physician Orders: Eval and Treat  Medical Diagnosis: Vertigo    Visit # / Visits Authorized:  3 / 20  Date of Evaluation: 3/13/25  Insurance Authorization Period: 1/30/2025 to 12/31/2025  Plan of Care Certification:  1 to 1       PT/PTA:     Number of PTA visits since last PT visit:   Time In: 1600   Time Out: 1645  Total Time: 45   Total Billable Time: 25    FOTO:  Intake Score:  %  Survey Score 1:  %  Survey Score 2:  %         Subjective   Moderate discomfort today. She stated that her shoulder has not been "snapping" as much since the MRI with arthrogram. She stated the pain is less intense, and she has been able to tolerate more HEP. She has dialed back her lifting at work..         Objective            Treatment:  Manual Therapy  MT 1: Rhythmic stabilization at 90 deg shoulder flexion for horizontal abd/add  MT 2: Rhythmic stabilization at 0 deg shoulder flexion for IR/ER  MT 3: PNF D2 flexion with manual resistance  Balance/Neuromuscular Re-Education  NMR 2: IR/ER walk outs vs YTB 2 bands 2x10  NMR 3: Wall wipes up/down side to side 2x10  NMR 4: Supine alphabet at 90 deg flexion x 2 laps  NMR 5: R shoulder isometrics flex/abd/IR/ER 10x10"  NMR 6: Tricep push down vs RTB 2x10  NMR 7: Bicep hammer curl eccentrics vs RTB 2x10    Time Entry(in minutes):  Manual Therapy Time Entry: 15  Neuromuscular Re-Education Time Entry: 25    Assessment & Plan   Assessment: Pt presented with moderate tissue irritation which is an improvement. She was able to tolerate more manual resistance and exercise without increased pain. We educated on the use of exercise to improve R shoulder stability. We also discussed medical interventions. We will continue " to progress conservative treatment to tolerance.       Patient will continue to benefit from skilled outpatient physical therapy to address the deficits listed in the problem list box on initial evaluation, provide pt/family education and to maximize pt's level of independence in the home and community environment.     Patient's spiritual, cultural, and educational needs considered and patient agreeable to plan of care and goals.           Plan: continue current POC    Goals:   Active       Functional outcome       Patient will show a significant change in FOTO patient-reported outcome tool to demonstrate subjective improvement       Start:  03/14/25    Expected End:  05/09/25            Patient will demonstrate independence in home program for support of progression       Start:  03/14/25    Expected End:  05/09/25               Lifting & carrying objects       Patient will lift/carry up to 15 lb for return to work/recreational activity without limitation.       Start:  03/14/25    Expected End:  05/09/25               Pain       Patient will report pain of 4/10 demonstrating a reduction of overall pain       Start:  03/14/25    Expected End:  05/09/25               Range of Motion       Patient will achieve right shoulder pain free flexion/abduction ROM to 170 degrees for improved functional ability.       Start:  03/14/25    Expected End:  05/09/25               Strength       Patient will demonstrate improved right shoulder strength to 4+/5 throughout.       Start:  03/14/25    Expected End:  05/09/25                Andrés Fernandez, PT

## 2025-04-21 ENCOUNTER — CLINICAL SUPPORT (OUTPATIENT)
Dept: REHABILITATION | Facility: OTHER | Age: 30
End: 2025-04-21
Payer: COMMERCIAL

## 2025-04-21 DIAGNOSIS — R29.898 RIGHT ARM WEAKNESS: Primary | ICD-10-CM

## 2025-04-21 PROCEDURE — 97140 MANUAL THERAPY 1/> REGIONS: CPT | Mod: PN

## 2025-04-21 PROCEDURE — 97112 NEUROMUSCULAR REEDUCATION: CPT | Mod: PN

## 2025-04-21 NOTE — PROGRESS NOTES
"  Outpatient Rehab    Physical Therapy Visit    Patient Name: Devora Meeks  MRN: [id number]  YOB: 1995  Encounter Date: 4/21/2025    Therapy Diagnosis:   Encounter Diagnosis   Name Primary?    Right arm weakness Yes       Physician: Kalpana Muñoz MD    Physician Orders: Eval and Treat  Medical Diagnosis: Vertigo    Visit # / Visits Authorized:  4 / 20  Date of Evaluation: 3/13/25  Insurance Authorization Period: 1/30/2025 to 12/31/2025  Plan of Care Certification:  1 to 1       PT/PTA:     Number of PTA visits since last PT visit:   Time In: 1555   Time Out: 1645  Total Time: 50   Total Billable Time: 25    FOTO:  Intake Score:  %  Survey Score 1:  %  Survey Score 2:  %         Subjective   Pt reports that her shoulder started snapping again, and the pain at the anterior R chest has returned verena after doing strenuous work (lifting/carrying)..         Objective            Treatment:  Manual Therapy  MT 1: Rhythmic stabilization at 90 deg shoulder flexion for horizontal abd/add  MT 2: Rhythmic stabilization at 0 deg shoulder flexion for IR/ER  MT 3: PNF D2 flexion with manual resistance  Balance/Neuromuscular Re-Education  NMR 2: IR/ER walk outs vs YTB 2 bands 2x10  NMR 4: Supine alphabet at 90 deg flexion x 2 laps  NMR 5: R shoulder isometrics flex/abd/IR/ER 10x10"  NMR 6: Tricep push down vs RTB 2x10  NMR 7: Bicep hammer curl eccentrics vs RTB 2x10  NMR 8: Body blade at side IR/ER 3x30" vs yellow body blade  NMR 9: Resisted shoulder flexion wall slides vs YTB 2x10    Time Entry(in minutes):  Manual Therapy Time Entry: 15  Neuromuscular Re-Education Time Entry: 25    Assessment & Plan   Assessment: Pt presented with moderate to severe tissue irritation today. We progressed shoulder stability movements and discussed potential medical intervention. We will continue to progress conservative treatment to tolerance.       Patient will continue to benefit from skilled outpatient physical therapy " to address the deficits listed in the problem list box on initial evaluation, provide pt/family education and to maximize pt's level of independence in the home and community environment.     Patient's spiritual, cultural, and educational needs considered and patient agreeable to plan of care and goals.           Plan: continue current POC    Goals:   Active       Functional outcome       Patient will show a significant change in FOTO patient-reported outcome tool to demonstrate subjective improvement       Start:  03/14/25    Expected End:  05/09/25            Patient will demonstrate independence in home program for support of progression       Start:  03/14/25    Expected End:  05/09/25               Lifting & carrying objects       Patient will lift/carry up to 15 lb for return to work/recreational activity without limitation.       Start:  03/14/25    Expected End:  05/09/25               Pain       Patient will report pain of 4/10 demonstrating a reduction of overall pain       Start:  03/14/25    Expected End:  05/09/25               Range of Motion       Patient will achieve right shoulder pain free flexion/abduction ROM to 170 degrees for improved functional ability.       Start:  03/14/25    Expected End:  05/09/25               Strength       Patient will demonstrate improved right shoulder strength to 4+/5 throughout.       Start:  03/14/25    Expected End:  05/09/25                Andrés Fernandez, PT

## 2025-04-22 ENCOUNTER — OFFICE VISIT (OUTPATIENT)
Dept: SPORTS MEDICINE | Facility: CLINIC | Age: 30
End: 2025-04-22
Payer: COMMERCIAL

## 2025-04-22 VITALS
DIASTOLIC BLOOD PRESSURE: 74 MMHG | BODY MASS INDEX: 22.07 KG/M2 | HEART RATE: 85 BPM | SYSTOLIC BLOOD PRESSURE: 119 MMHG | WEIGHT: 124.56 LBS | HEIGHT: 63 IN

## 2025-04-22 DIAGNOSIS — M25.311 MULTIDIRECTIONAL INSTABILITY OF RIGHT GLENOHUMERAL JOINT: Primary | ICD-10-CM

## 2025-04-22 PROCEDURE — 1159F MED LIST DOCD IN RCRD: CPT | Mod: CPTII,S$GLB,, | Performed by: ORTHOPAEDIC SURGERY

## 2025-04-22 PROCEDURE — 3074F SYST BP LT 130 MM HG: CPT | Mod: CPTII,S$GLB,, | Performed by: ORTHOPAEDIC SURGERY

## 2025-04-22 PROCEDURE — 3078F DIAST BP <80 MM HG: CPT | Mod: CPTII,S$GLB,, | Performed by: ORTHOPAEDIC SURGERY

## 2025-04-22 PROCEDURE — 99214 OFFICE O/P EST MOD 30 MIN: CPT | Mod: S$GLB,,, | Performed by: ORTHOPAEDIC SURGERY

## 2025-04-22 PROCEDURE — 3008F BODY MASS INDEX DOCD: CPT | Mod: CPTII,S$GLB,, | Performed by: ORTHOPAEDIC SURGERY

## 2025-04-22 PROCEDURE — 99999 PR PBB SHADOW E&M-EST. PATIENT-LVL IV: CPT | Mod: PBBFAC,,, | Performed by: ORTHOPAEDIC SURGERY

## 2025-04-22 NOTE — PROGRESS NOTES
CC: RIGHT shoulder pain     29 y.o. Female with a 9 month history of right shoulder atraumatic pain. She has been seeing pain management as well as PT for the pain. She reports having done PT for 12 weeks in the past. She is on her second round of therapy currently, ~4 weeks in. She reports no history of antecedent trauma. No past history of surgery on the right shoulder. She is reporting that she feels the shoulder slips anteriorly/inferiorly. She is also able to do this voluntarily. She reports she is ready to undergo surgical intervention.      She states that the pain is severe and not responding to any conservative care.      She reports that the pain and weakness is worse with overhead activity. It also bothers her at night.    Is affecting ADLs.  Pain is 10/10 at it's worst.      Past Medical History:   Diagnosis Date    Abnormal Pap smear of cervix     ADHD (attention deficit hyperactivity disorder)     Chronic shoulder pain     Migraine        Past Surgical History:   Procedure Laterality Date    CERVICAL BIOPSY  W/ LOOP ELECTRODE EXCISION N/A 2016    one abnormal after, clear since that time    COLONOSCOPY N/A 8/6/2024    Procedure: COLONOSCOPY;  Surgeon: Crispin Ardon MD;  Location: [facility] ([address] [place] [address]);  Service: Endoscopy;  Laterality: N/A;    COLPOSCOPY      ESOPHAGOGASTRODUODENOSCOPY N/A 8/6/2024    Procedure: EGD (ESOPHAGOGASTRODUODENOSCOPY);  Surgeon: Crispin Ardon MD;  Location: [address]);  Service: Endoscopy;  Laterality: N/A;  6/14/24- case length adjusted - ERW    INJECTION OF ANESTHETIC AGENT AROUND NERVE Right 1/29/2025    Procedure: INJECTION, RIGHT ACRIO CLAVICULAR JOINT;  Surgeon: Karena Davidson MD;  [facility];  Service: Pain Management;  Laterality: Right;  2 wk f/u rodrick    INJECTION OF ANESTHETIC AGENT AROUND NERVE Right 3/18/2025    Procedure: INJECTION, RIGHT BICEP TENDON UNDER ULTRASOUND;  Surgeon: Karena Davidson MD;  Location: [facility] PAIN T;   "Service: Pain Management;  Laterality: Right;  2 WK F/U JAMES       Family History   Problem Relation Name Age of Onset    Melanoma Mother Danya     Basal cell carcinoma Mother Danya     Cancer Mother Danya     Hypertension Mother Danya     Miscarriages / Stillbirths Mother Danya     Skin cancer Brother  30    Heart attack Maternal Grandfather  60    Colon cancer Maternal Uncle  45    Arthritis Maternal Grandmother M     Heart disease Maternal Grandmother M        Current Medications[1]    Review of patient's allergies indicates:  No Known Allergies       REVIEW OF SYSTEMS:  Constitution: Negative. Negative for chills, fever and night sweats.   HENT: Negative for congestion and headaches.    Eyes: Negative for blurred vision, left vision loss and right vision loss.   Cardiovascular: Negative for chest pain and syncope.   Respiratory: Negative for cough and shortness of breath.    Endocrine: Negative for polydipsia, polyphagia and polyuria.   Hematologic/Lymphatic: Negative for bleeding problem. Does not bruise/bleed easily.   Skin: Negative for dry skin, itching and rash.   Musculoskeletal: Negative for falls.  Positive for right shoulder pain and muscle weakness.   Gastrointestinal: Negative for abdominal pain and bowel incontinence.   Genitourinary: Negative for bladder incontinence and nocturia.   Neurological: Negative for disturbances in coordination, loss of balance and seizures.   Psychiatric/Behavioral: Negative for depression. The patient does not have insomnia.    Allergic/Immunologic: Negative for hives and persistent infections.      PHYSICAL EXAMINATION:  Vitals:  /74   Pulse 85   Ht 5' 3" (1.6 m)   Wt 56.5 kg (124 lb 9 oz)   BMI 22.06 kg/m²    General: The patient is alert and oriented x 3.  Mood is pleasant.  Observation of ears, eyes and nose reveal no gross abnormalities.  No labored breathing observed.  Gait is coordinated. Patient can toe walk and heel walk without difficulty.      RIGHT " SHOULDER / UPPER EXTREMITY EXAM    OBSERVATION:     Swelling  none  Deformity  none   Discoloration  none   Scapular winging none   Scars   none  Atrophy  none    TENDERNESS / CREPITUS (T/C):          T/C      T/C   Clavicle   -/-  SUPRAspinatus    -/-     AC Jt.    +/-  INFRAspinatus  -/-    SC Jt.    -/-  Deltoid    -/-      G. Tuberosity  -/-  LH BICEP groove  +/-   Acromion:  -/-  Midline Neck   -/-     Scapular Spine -/-  Trapezium   -/-   SMA Scapula  -/-  GH jt. line - post  -/-     Scapulothoracic  -/-         ROM: (* = with pain)  Left shoulder   Right shoulder        AROM (PROM)   AROM (PROM)   FE    170° (175°)     *170° (175°)     ER at 0°    60°  (65°)    *60°  (65°)   ER at 90° ABD  90°  (90°)    *90°  (90°)   IR at 90°  ABD   NA  (40°)     *NA  (40°)      IR (spine level)   T10     *T10    STRENGTH: (* = with pain) Left shoulder   Right shoulder   SCAPTION   5/5    *5/5    IR    5/5    *5/5   ER    5/5    *5/5   BICEPS   5/5    *5/5   Deltoid    5/5    *5/5     SIGNS:  Painful side       NEER   -    OGEORGES  +    ORTIZ   -    SPEEDS  +     DROP ARM   -   BELLY PRESS neg   Superior escape none    LIFT-OFF  neg   X-Body ADD    neg    MOVING VALGUS neg        STABILITY TESTING    Left shoulder   Right shoulder    Translation     Anterior  up face     up face    Posterior  up face    up face    Sulcus   < 10mm    < 10 mm     Signs   Apprehension   neg      +       Relocation   no change     change      Jerk test  neg     -    EXTREMITY NEURO-VASCULAR EXAM:    Sensation grossly intact to light touch all dermatomal regions.    DTR 2+ Biceps, Triceps, BR and Negative Humzas sign   Grossly intact motor function at Elbow, Wrist and Hand   Distal pulses radial and ulnar 2+, brisk cap refill, symmetric.      NECK:  Painless FROM and spinous processes non-tender. Negative Spurlings sign.      OTHER FINDINGS:  - scapular dyskinesia  + sulcus sign  Able to voluntarily subluxate shoulder anteriorly and  reproduce sulcus sign    XRAYS:  Xrays including AP, Outlet and Axillary Lateral of shoulder are ordered / images reviewed by me:   No fracture dislocation or other pathology   Acromion type 2   Proximal migration of humeral head: None   GH arthritis: None          ASSESSMENT:   Right shoulder pain:  1. Multidirectional instability of right glenohumeral joint          PLAN:      1. Plan for surgical intervention involving arthroscopic capsular plication of the right shoulder      All questions were answered, patient will contact us for questions or concerns in the interim.               [1]   Current Outpatient Medications:     busPIRone (BUSPAR) 15 MG tablet, Take 7.5 mg by mouth 2 (two) times daily., Disp: , Rfl:     butalbital-acetaminophen-caffeine -40 mg (FIORICET, ESGIC) -40 mg per tablet, Take 1 tablet by mouth every 6 (six) hours as needed for Headaches. Nto to betaken more than 2 days out of the week., Disp: 20 tablet, Rfl: 2    gabapentin (NEURONTIN) 300 MG capsule, Take 1 capsule (300 mg total) by mouth 2 (two) times daily., Disp: 180 capsule, Rfl: 3    methylphenidate HCl (RITALIN) 5 MG tablet, Take 5 mg by mouth 2 (two) times daily., Disp: , Rfl:     norethindrone (MICRONOR) 0.35 mg tablet, Take 1 tablet (0.35 mg total) by mouth once daily., Disp: 90 tablet, Rfl: 3    diazePAM (VALIUM) 5 MG tablet, Take 2 tablets (10 mg total) by mouth once. May repeat if necessary for 1 dose, Disp: 2 tablet, Rfl: 0    HYDROcodone-acetaminophen (NORCO) 5-325 mg per tablet, Take 1 tablet by mouth every 8 (eight) hours as needed for Pain. (Patient not taking: Reported on 4/22/2025), Disp: 12 tablet, Rfl: 0

## 2025-04-22 NOTE — LETTER
[facility] 1st Fl  [address]  [facility] [address]  Phone: [phone]  Fax: [phone] April 22, 2025     Patient: Devora Meeks   YOB: 1995   Date of Visit: 4/22/2025       To Whom It May Concern:    Devora is a patient of mine. She is scheduled for surgery by me on 5/7/25. She will be out of work for two weeks post surgery.     If you have any questions or concerns, please don't hesitate to contact my office.    Sincerely,          Femi Sotelo MD

## 2025-04-25 ENCOUNTER — PATIENT MESSAGE (OUTPATIENT)
Dept: SPORTS MEDICINE | Facility: CLINIC | Age: 30
End: 2025-04-25
Payer: COMMERCIAL

## 2025-04-28 ENCOUNTER — CLINICAL SUPPORT (OUTPATIENT)
Dept: REHABILITATION | Facility: OTHER | Age: 30
End: 2025-04-28
Payer: COMMERCIAL

## 2025-04-28 DIAGNOSIS — R29.898 RIGHT ARM WEAKNESS: Primary | ICD-10-CM

## 2025-04-28 PROCEDURE — 97112 NEUROMUSCULAR REEDUCATION: CPT | Mod: PN

## 2025-04-28 PROCEDURE — 97140 MANUAL THERAPY 1/> REGIONS: CPT | Mod: PN

## 2025-04-28 NOTE — PROGRESS NOTES
"    Outpatient Rehab    Physical Therapy Visit    Patient Name: Devora Meeks  MRN: [id number]  YOB: 1995  Encounter Date: 4/28/2025    Therapy Diagnosis:   Encounter Diagnosis   Name Primary?    Right arm weakness Yes       Physician: Kalpana Muñoz MD    Physician Orders: Eval and Treat  Medical Diagnosis: Vertigo    Visit # / Visits Authorized:  5 / 20  Date of Evaluation: 3/13/25  Insurance Authorization Period: 1/30/2025 to 12/31/2025  Plan of Care Certification:  1 to 1       PT/PTA:     Number of PTA visits since last PT visit:   Time In: 1230   Time Out: 1255  Total Time: 25   Total Billable Time: 25    FOTO:  Intake Score:  %  Survey Score 1:  %  Survey Score 2:  %         Subjective   R shoulder continues to be painful. She was able to tolerate last PT tx well..         Objective            Treatment:  Manual Therapy  MT 1: Rhythmic stabilization at 90 deg shoulder flexion for horizontal abd/add  MT 2: Rhythmic stabilization at 0 deg shoulder flexion for IR/ER  MT 3: PNF D2 flexion with manual resistance  Balance/Neuromuscular Re-Education  NMR 2: IR/ER walk outs vs YTB 2 bands 2x10  NMR 6: Tricep push down vs RTB 2x10  NMR 7: Bicep hammer curl eccentrics vs RTB 2x10  NMR 8: Body blade at side, IR/ER at 0 deg flexion, and at 90 deg flexion 3x30" vs yellow body blade  NMR 9: Resisted shoulder flexion wall slides vs YTB 2x10    Time Entry(in minutes):  Manual Therapy Time Entry: 15  Neuromuscular Re-Education Time Entry: 10    Assessment & Plan   Assessment: Pt presented with moderate tissue irritation today. We continued to progress R shoulder stabilization exercise and manual resistance. She is having a capsulorhaphy on 5/7/25, and we will resume PT after that procedure.       Patient will continue to benefit from skilled outpatient physical therapy to address the deficits listed in the problem list box on initial evaluation, provide pt/family education and to maximize pt's level " of independence in the home and community environment.     Patient's spiritual, cultural, and educational needs considered and patient agreeable to plan of care and goals.           Plan: continue current POC    Goals:   Active       Functional outcome       Patient will show a significant change in FOTO patient-reported outcome tool to demonstrate subjective improvement       Start:  03/14/25    Expected End:  05/09/25            Patient will demonstrate independence in home program for support of progression       Start:  03/14/25    Expected End:  05/09/25               Lifting & carrying objects       Patient will lift/carry up to 15 lb for return to work/recreational activity without limitation.       Start:  03/14/25    Expected End:  05/09/25               Pain       Patient will report pain of 4/10 demonstrating a reduction of overall pain       Start:  03/14/25    Expected End:  05/09/25               Range of Motion       Patient will achieve right shoulder pain free flexion/abduction ROM to 170 degrees for improved functional ability.       Start:  03/14/25    Expected End:  05/09/25               Strength       Patient will demonstrate improved right shoulder strength to 4+/5 throughout.       Start:  03/14/25    Expected End:  05/09/25                Andrés Fernandez, PT

## 2025-04-29 ENCOUNTER — PATIENT MESSAGE (OUTPATIENT)
Dept: PREADMISSION TESTING | Facility: HOSPITAL | Age: 30
End: 2025-04-29
Payer: COMMERCIAL

## 2025-04-29 NOTE — ANESTHESIA PAT ROS NOTE
04/29/2025  Devora Meeks is a 30 y.o., female.      Pre-op Assessment    I have reviewed the Patient Summary Reports.       I have reviewed the Medications.     Review of Systems  Anesthesia Hx:  No problems with previous Anesthesia   History of prior surgery of interest to airway management or planning:  Previous anesthesia: MAC       8/6/2024  Colonoscopy with MAC.  Procedure performed at an [facility].    Denies Personal Hx of Anesthesia complications.                    Social:  Non-Smoker, Social Alcohol Use       Hematology/Oncology:    Oncology Normal    -- Anemia:               Hematology Comments: Mild iron deficiency anemia                    EENT/Dental:   Tinnitis right ear          Cardiovascular:  Cardiovascular Normal Exercise tolerance: good        Denies Dysrhythmias.   Denies Angina.       Denies JEAN-BAPTISTE.  ECG has been reviewed.  Patient not on beta blockers                          Pulmonary:  Pulmonary Normal   Denies COPD.  Denies Asthma.   Denies Shortness of breath.                  Renal/:  Renal/ Normal                 Hepatic/GI:  Hepatic/GI Normal     Denies GERD.    Not Taking GLP-1 Agonists            Musculoskeletal:  Arthritis   Multidirectional instability of right glenohumeral joint,  Shoulder impingement syndrome, right,  Injection of anesthetic agent around nerve,  Biceps tendinopathy, S/P Injection into Biceps Tendon guided by US,  AC joint arthropathy, S/P injection,  Osteoarthritis of AC (acromioclavicular) joint             OB/GYN/PEDS:  H/O Abnormal pap smear of cervix, S/P Cervical Biopsy w/ LOOP Electrode Excision,  Endometriosis           Neurological:      Headaches Denies Seizures.    Migraine headaches,  Episode of Numbness and tingling of right side of face along with intractable migraine, seen by neurology,   9 mm pituitary adenoma,   Cervical  radiculopathy at C6,        Chronic Pain Syndrome                         Endocrine:  Endocrine Normal Denies Diabetes. Denies Hypothyroidism.          Psych:  Psychiatric History anxiety  ADHD,  Insomnia              Past Medical History:   Diagnosis Date    Abnormal Pap smear of cervix     ADHD (attention deficit hyperactivity disorder)     Chronic shoulder pain     Migraine      Past Surgical History:   Procedure Laterality Date    CERVICAL BIOPSY  W/ LOOP ELECTRODE EXCISION N/A 2016    one abnormal after, clear since that time    COLONOSCOPY N/A 8/6/2024    Procedure: COLONOSCOPY;  Surgeon: Crispin Ardon MD;  Location: [facility] ENDO (4TH FLR);  Service: Endoscopy;  Laterality: N/A;    COLPOSCOPY      ESOPHAGOGASTRODUODENOSCOPY N/A 8/6/2024    Procedure: EGD (ESOPHAGOGASTRODUODENOSCOPY);  Surgeon: Crispin Ardon MD;  Location: [facility] ENDO (4TH FLR);  Service: Endoscopy;  Laterality: N/A;  6/14/24- case length adjusted - ERW    INJECTION OF ANESTHETIC AGENT AROUND NERVE Right 1/29/2025    Procedure: INJECTION, RIGHT ACRIO CLAVICULAR JOINT;  Surgeon: Karena Davidson MD;  Location: [facility] [place] [facility]IN [facility]GT;  Service: Pain Management;  Laterality: Right;  2 wk f/u rodrick    INJECTION OF ANESTHETIC AGENT AROUND NERVE Right 3/18/2025    Procedure: INJECTION, RIGHT BICEP TENDON UNDER ULTRASOUND;  Surgeon: Karena Davidson MD;  Location: [facility] [place] [facility]GT;  Service: Pain Management;  Laterality: Right;  2 WK F/U RODRICK       Anesthesia Assessment: Preoperative EQUATION    Planned Procedure: Procedure(s) (LRB):  ARTHROSCOPY, SHOULDER,W/ CAPSULORRHAPHY (Right)  Requested Anesthesia Type:General  Surgeon: Femi Sotelo MD  Service: Orthopedics  Known or anticipated Date of Surgery:5/7/2025    Surgeon notes: reviewed    Electronic QUestionnaire Assessment completed via nurse interview with patient.        Triage considerations:     The patient has no apparent active cardiac condition (No unstable coronary Syndrome such as severe  unstable angina or recent [<1 month] myocardial infarction, decompensated CHF, severe valvular   disease or significant arrhythmia)    Previous anesthesia records:MAC and No problems    Last PCP note: 3-6 months ago , [facility]   Subspecialty notes: Pain Management    Other important co-morbidities:  No co-morbidities noted.         EKG 12/7/2024:  Vent. Rate :  93 BPM     Atrial Rate :  93 BPM      P-R Int : 124 ms          QRS Dur :  84 ms       QT Int : 384 ms       P-R-T Axes :  60  81  66 degrees     QTcB Int : 477 ms   Normal sinus rhythm   Normal ECG   No previous ECGs available   Confirmed by Eduardo Lu ([id number]) on 12/8/2024 10:32:36 AM        Tests already available:  Results have been reviewed.             Instructions given. (See in Nurse's note) Preop medication instructions sent via portal message.     Optimization:  Anesthesia Preop Clinic Assessment Not Indicated    Medical Opinion Indicated: No       Sub-specialist consult indicated:  No      Plan:  Consultation: Medical clearance is not requested.         Navigation:  Straight Line to surgery.               No tests, anesthesia preop clinic visit, or consult required.                        Patient is OK to proceed with surgery at [facility].       Ht: 5'3  Wt: 56.5 kg (124 lb)  BMI: 22.06

## 2025-05-02 ENCOUNTER — TELEPHONE (OUTPATIENT)
Dept: SPORTS MEDICINE | Facility: CLINIC | Age: 30
End: 2025-05-02
Payer: COMMERCIAL

## 2025-05-02 NOTE — TELEPHONE ENCOUNTER
Authorized surgery via peer to peer.     ----- Message from Michael Haas sent at 5/2/2025  3:55 PM CDT -----  Case approved. #[id number] from 5/7-8/4

## 2025-05-06 ENCOUNTER — ANESTHESIA EVENT (OUTPATIENT)
Dept: SURGERY | Facility: HOSPITAL | Age: 30
End: 2025-05-06
Payer: COMMERCIAL

## 2025-05-06 ENCOUNTER — OFFICE VISIT (OUTPATIENT)
Dept: SPORTS MEDICINE | Facility: CLINIC | Age: 30
End: 2025-05-06
Payer: COMMERCIAL

## 2025-05-06 VITALS
HEIGHT: 63 IN | WEIGHT: 124.56 LBS | BODY MASS INDEX: 22.07 KG/M2 | DIASTOLIC BLOOD PRESSURE: 82 MMHG | HEART RATE: 67 BPM | SYSTOLIC BLOOD PRESSURE: 115 MMHG

## 2025-05-06 DIAGNOSIS — M25.311 MULTIDIRECTIONAL INSTABILITY OF RIGHT GLENOHUMERAL JOINT: Primary | ICD-10-CM

## 2025-05-06 PROCEDURE — 99999 PR PBB SHADOW E&M-EST. PATIENT-LVL III: CPT | Mod: PBBFAC,,, | Performed by: PHYSICIAN ASSISTANT

## 2025-05-06 PROCEDURE — 99499 UNLISTED E&M SERVICE: CPT | Mod: S$GLB,,, | Performed by: PHYSICIAN ASSISTANT

## 2025-05-06 RX ORDER — PROMETHAZINE HYDROCHLORIDE 25 MG/1
25 TABLET ORAL EVERY 6 HOURS PRN
Qty: 20 TABLET | Refills: 0 | Status: SHIPPED | OUTPATIENT
Start: 2025-05-06

## 2025-05-06 RX ORDER — SODIUM CHLORIDE 9 MG/ML
INJECTION, SOLUTION INTRAVENOUS CONTINUOUS
Status: CANCELLED | OUTPATIENT
Start: 2025-05-06

## 2025-05-06 RX ORDER — NAPROXEN SODIUM 220 MG/1
81 TABLET, FILM COATED ORAL 2 TIMES DAILY
Qty: 28 TABLET | Refills: 0 | Status: SHIPPED | OUTPATIENT
Start: 2025-05-06

## 2025-05-06 RX ORDER — TRAMADOL HYDROCHLORIDE 50 MG/1
50 TABLET ORAL EVERY 6 HOURS PRN
Qty: 20 TABLET | Refills: 0 | Status: SHIPPED | OUTPATIENT
Start: 2025-05-06

## 2025-05-06 RX ORDER — OXYCODONE AND ACETAMINOPHEN 10; 325 MG/1; MG/1
1 TABLET ORAL EVERY 6 HOURS PRN
Qty: 28 TABLET | Refills: 0 | Status: SHIPPED | OUTPATIENT
Start: 2025-05-06

## 2025-05-06 NOTE — H&P
Devora Meeks  is here for a completion of her perioperative paperwork. she  Is scheduled to undergo:    Right shoulder arthroscopic capsulorrhaphy and any other indicated procedures     on 5/7/2025.      She is a healthy individual and does not need clearance for this procedure.     PAST MEDICAL HISTORY:   Past Medical History:   Diagnosis Date    Abnormal Pap smear of cervix     ADHD (attention deficit hyperactivity disorder)     Chronic shoulder pain     Migraine      PAST SURGICAL HISTORY:   Past Surgical History:   Procedure Laterality Date    CERVICAL BIOPSY  W/ LOOP ELECTRODE EXCISION N/A 2016    one abnormal after, clear since that time    COLONOSCOPY N/A 8/6/2024    Procedure: COLONOSCOPY;  Surgeon: Crispin Ardon MD;  Location: [facility] ENDO (4TH FLR);  Service: Endoscopy;  Laterality: N/A;    COLPOSCOPY      ESOPHAGOGASTRODUODENOSCOPY N/A 8/6/2024    Procedure: EGD (ESOPHAGOGASTRODUODENOSCOPY);  Surgeon: Crispin Ardon MD;  Location: [facility] (4TH FLR);  Service: Endoscopy;  Laterality: N/A;  6/14/24- case length adjusted - ERW    INJECTION OF ANESTHETIC AGENT AROUND NERVE Right 1/29/2025    Procedure: INJECTION, RIGHT ACRIO CLAVICULAR JOINT;  Surgeon: Karena Davidson MD;  Location: [facility] PAIN MGT;  Service: Pain Management;  Laterality: Right;  2 wk f/u rodrick    INJECTION OF ANESTHETIC AGENT AROUND NERVE Right 3/18/2025    Procedure: INJECTION, RIGHT BICEP TENDON UNDER ULTRASOUND;  Surgeon: Karena Davidson MD;  Location: [facility] PAIN MGT;  Service: Pain Management;  Laterality: Right;  2 WK F/U RODRICK     FAMILY HISTORY:   Family History   Problem Relation Name Age of Onset    Melanoma Mother Danya     Basal cell carcinoma Mother Danya     Cancer Mother Danya     Hypertension Mother Danya     Miscarriages / Stillbirths Mother Danya     Skin cancer Brother  30    Heart attack Maternal Grandfather  60    Colon cancer Maternal Uncle  45    Arthritis Maternal Grandmother M     Heart disease Maternal  "Grandmother M      SOCIAL HISTORY: Social History[1]    MEDICATIONS: Current Medications[2]  ALLERGIES: Review of patient's allergies indicates:  No Known Allergies    VITAL SIGNS: /82 (BP Location: Right arm, Patient Position: Sitting)   Pulse 67   Ht 5' 3" (1.6 m)   Wt 56.5 kg (124 lb 9 oz)   BMI 22.06 kg/m²      Risks, indications and benefits of the surgical procedure were discussed with the patient. All questions with regard to surgery, rehab, expected return to functional activities, activities of daily living and recreational endeavors were answered to her satisfaction.    It was explained to the patient that there may be an increase in surgical risks if the patient has certain co-morbidities such as but not limited to: Obesity, Cardiovascular issues (CHF, CAD, Arrhythmias), chronic pulmonary issues, previous or current neurovascular/neurological issues, previous strokes, diabetes mellitus, previous wound healing issues, previous wound or skin infections, PVD, clotting disorders, if the patient uses chronic steroids, if the patient takes or has immune compromising medications or diseases, or has previously or currently used tobacco products.     The patient verbalized that he/she does not have any additional clotting, bleeding, or blood disorders, other than what is list in her chart on today's review.     Then a brief history and physical exam were performed.    Review of Systems   Constitution: Negative. Negative for chills, fever and night sweats.   HENT: Negative for congestion and headaches.    Eyes: Negative for blurred vision, left vision loss and right vision loss.   Cardiovascular: Negative for chest pain and syncope.   Respiratory: Negative for cough and shortness of breath.    Endocrine: Negative for polydipsia, polyphagia and polyuria.   Hematologic/Lymphatic: Negative for bleeding problem. Does not bruise/bleed easily.   Skin: Negative for dry skin, itching and rash.   Musculoskeletal: " Negative for falls and muscle weakness.   Gastrointestinal: Negative for abdominal pain and bowel incontinence.   Genitourinary: Negative for bladder incontinence and nocturia.   Neurological: Negative for disturbances in coordination, loss of balance and seizures.   Psychiatric/Behavioral: Negative for depression. The patient does not have insomnia.    Allergic/Immunologic: Negative for hives and persistent infections.     PHYSICAL EXAM:  GEN: A&Ox3, WD WN NAD  HEENT: WNL  CHEST: CTAB, no W/R/R  HEART: RRR, no M/R/G  ABD: Soft, NT ND, BS x4 QUADS  MS; See Epic  NEURO: CN II-XII intact       The surgical consent was then reviewed with the patient, who agreed with all the contents of the consent form and it was signed. she was then given the [facility] surgery packet to bring with her to surgery for the anesthesia portion of her perioperative paperwork.   For all physicians except for Dr. Sotelo, we will email and possibly fax the consent forms and booking sheets to [facility] pre-admit.    The patient was given the opportunity to ask questions about the surgical plan and consent form, and once no other questions were asked, I proceeded with the pre-op appointment.    PHYSICAL THERAPY:  She was also instructed regarding physical therapy and will begin on POD#3-5 at the [facility] location.     POST OP CARE:instructions were reviewed including care of the wound and dressing after surgery and when she can shower.     CRUTCHES OR WALKER: It was explained to the patient that if they are having a lower extremity surgery that they will require either a walker or crutches to ambulate safely with after surgery. It was explained that a cane or other assistive devices are not sufficient to safely ambulate with after surgery. I explained to the patient that I will place an order for them to receive either crutches or a walker after surgery to go home with. It was explained that if they have  crutches or a walker at home already, that they are REQUIRED to bring them to the hospital on the day of surgery. It was explained that if they do not have them at the hospital on the day of surgery that they WILL be provided a new pair or crutches or a walker to go home with to ensure ambulation will be safe if the patient needs to stop somewhere on the way home.      PAIN MANAGEMENT: Devora Meeks was also given their pain management regimen, which includes the TENS unit given to her by [facility] along with the education required for its use.    PAIN MEDICATION:  Percocet 10/325mg 1 po q 4-6 hours prn pain  Ultram 50 mg Take 1-2 p.o. q.6 hours p.r.n. breakthrough pain,   Phenergan 25 mg one p.o. q.6 hours p.r.n. nausea and vomiting.    Post op meds to be delivered bedside prior to discharge. Deliver to family if patient is in surgery at 5pm.    The patient was told that narcotic pain medications may make them drowsy and instructions were given to not sign legal documents, drive or operate heavy machinery, cars, or equipment while under the influence of narcotic medications. The patient was told and understands that narcotic pain medications should only be used as needed to control pain and that other options of pain control include TENs unit and ice packs/unit.     Patient was instructed to purchase and take Colace to counter possible GI side effects of taking opiates.     DVT prophylaxis was discussed with the patient today including risk factors for developing DVTs and history of DVTs. The patient was asked if any specific recommendations were given from the doctor/s that did pre-operative surgical clearance. The patient was then given an education sheet about DVTs and PE with warning signs and symptoms of both and steps to take if they suspect either of these.    Patient was asked if they were taking or using OCP pills or devices. If they answered yes, then they were instructed to stop using OCPs at  this pre-operative appointment until 2 months post-op to help prevent DVT development. They understand that there are other forms of birth control that do not involve hormones. They expressed understanding that ignoring/not following this instruction could result in a DVT which could turn into a deadly pulmonary embolism.     This along with the Modified Caprini risk assessment model for VTE in general surgical patients was used to determine the patient's DVT risk.     From: Charmaine MK, Pancho DA, Polina SM, et al. Prevention of VTE in nonorthopedic surgical patients: antithrombotic therapy and prevention of thrombosis, 9th ed: American College of Chest Physicians evidence-based clinical practical guidelines. Chest 2012; 141:e227S. Copyright © 2012. Reproduced with permission from the American College of Chest Physicians.    The below listed DVT prophylaxis regimen was discussed along with SCDs during surgery and bilateral CARMEN compression stockings to be used post-op. Length of treatment has been determined to be 10-42 days post-op by the above noted Caprini assessment model. Early ambulation post-op was also discussed and emphasized with the patient.     The patient was instructed to buy and take:  Aspirin 81mg BID x 2 weeks for DVT prophylaxis starting on the morning after surgery.  Patient will also use bilateral TEDs on lower extremities, SCDs during surgery, and early ambulation post-op. If the patient was previously taking 81mg baby aspirin, they were told to not take it starting 5 days prior to surgery and to restart the 81mg aspirin after surgery.       Patient was also told to buy over the counter Prilosec medication if needed and take it once daily for GI protection as long as they are taking NSAIDs or Aspirin.    Patient denies history of seizures.     I explained to following and the patient expressed understanding:  The patient is currently aware of the COVID19 pandemic and that proceeding with their  surgical procedure could potentially increase exposure to coronavirus in the community. The patient understands that there is the possibility of delayed or cancelled appts or PT visits in the future. They understand that infection with the coronavirus could complicate their surgery recovery. They are aware of the current policies and procedures of [facility] and the government regarding the pandemic and they were given the option of delaying my surgery. The patient elects to proceed with surgery at this time.     The patient was instructed to practice strict social distancing, hand washing/hygiene, respiratory hygiene, and cough etiquette from now until 6 weeks following surgery to reduce the risk of fili coronavirus.    As there were no other questions to be asked, she was given my business card along with Femi Sotelo MD business card if she has any questions or concerns prior to surgery or in the postop period.          [1]   Social History  Socioeconomic History    Marital status: Single   Occupational History    Occupation:      Comment: [employer]   Tobacco Use    Smoking status: Never    Smokeless tobacco: Never    Tobacco comments:     Occasional social smoking rarely   Substance and Sexual Activity    Alcohol use: Yes     Comment: occasionally    Drug use: Never    Sexual activity: Yes     Partners: Male     Birth control/protection: Condom, OCP     Social Drivers of Health     Financial Resource Strain: Low Risk  (2/24/2025)    Overall Financial Resource Strain (CARDIA)     Difficulty of Paying Living Expenses: Not very hard   Food Insecurity: No Food Insecurity (2/24/2025)    Hunger Vital Sign     Worried About Running Out of Food in the Last Year: Never true     Ran Out of Food in the Last Year: Never true   Transportation Needs: No Transportation Needs (2/24/2025)    PRAPARE - Transportation     Lack of Transportation (Medical): No     Lack of Transportation (Non-Medical): No    Physical Activity: Insufficiently Active (2/24/2025)    Exercise Vital Sign     Days of Exercise per Week: 3 days     Minutes of Exercise per Session: 40 min   Stress: Patient Declined (2/24/2025)    [employer] - Occupational Stress Questionnaire     Feeling of Stress : Patient declined   Housing Stability: Unknown (2/24/2025)    Housing Stability Vital Sign     Unable to Pay for Housing in the Last Year: Patient declined     Number of Times Moved in the Last Year: 1     Homeless in the Last Year: No   [2]   Current Outpatient Medications:     aspirin 81 MG Chew, Take 1 tablet (81 mg total) by mouth 2 (two) times a day., Disp: 28 tablet, Rfl: 0    busPIRone (BUSPAR) 15 MG tablet, Take 7.5 mg by mouth 2 (two) times daily., Disp: , Rfl:     butalbital-acetaminophen-caffeine -40 mg (FIORICET, ESGIC) -40 mg per tablet, Take 1 tablet by mouth every 6 (six) hours as needed for Headaches. Nto to betaken more than 2 days out of the week., Disp: 20 tablet, Rfl: 2    diazePAM (VALIUM) 5 MG tablet, Take 2 tablets (10 mg total) by mouth once. May repeat if necessary for 1 dose, Disp: 2 tablet, Rfl: 0    gabapentin (NEURONTIN) 300 MG capsule, Take 1 capsule (300 mg total) by mouth 2 (two) times daily., Disp: 180 capsule, Rfl: 3    HYDROcodone-acetaminophen (NORCO) 5-325 mg per tablet, Take 1 tablet by mouth every 8 (eight) hours as needed for Pain. (Patient not taking: Reported on 4/22/2025), Disp: 12 tablet, Rfl: 0    methylphenidate HCl (RITALIN) 5 MG tablet, Take 5 mg by mouth 2 (two) times daily., Disp: , Rfl:     norethindrone (MICRONOR) 0.35 mg tablet, Take 1 tablet (0.35 mg total) by mouth once daily., Disp: 90 tablet, Rfl: 3    oxyCODONE-acetaminophen (PERCOCET)  mg per tablet, Take 1 tablet by mouth every 6 (six) hours as needed for Pain., Disp: 28 tablet, Rfl: 0    promethazine (PHENERGAN) 25 MG tablet, Take 1 tablet (25 mg total) by mouth every 6 (six) hours as needed  for Nausea., Disp: 20 tablet, Rfl: 0    traMADoL (ULTRAM) 50 mg tablet, Take 1 tablet (50 mg total) by mouth every 6 (six) hours as needed for Pain., Disp: 20 tablet, Rfl: 0

## 2025-05-06 NOTE — H&P (VIEW-ONLY)
Devora Meeks  is here for a completion of her perioperative paperwork. she  Is scheduled to undergo:    Right shoulder arthroscopic capsulorrhaphy and any other indicated procedures     on 5/7/2025.      She is a healthy individual and does not need clearance for this procedure.     PAST MEDICAL HISTORY:   Past Medical History:   Diagnosis Date    Abnormal Pap smear of cervix     ADHD (attention deficit hyperactivity disorder)     Chronic shoulder pain     Migraine      PAST SURGICAL HISTORY:   Past Surgical History:   Procedure Laterality Date    CERVICAL BIOPSY  W/ LOOP ELECTRODE EXCISION N/A 2016    one abnormal after, clear since that time    COLONOSCOPY N/A 8/6/2024    Procedure: COLONOSCOPY;  Surgeon: Crispin Ardon MD;  Location: [facility] ENDO (4TH FLR);  Service: Endoscopy;  Laterality: N/A;    COLPOSCOPY      ESOPHAGOGASTRODUODENOSCOPY N/A 8/6/2024    Procedure: EGD (ESOPHAGOGASTRODUODENOSCOPY);  Surgeon: Crispin Ardon MD;  Location: [facility] (4TH FLR);  Service: Endoscopy;  Laterality: N/A;  6/14/24- case length adjusted - ERW    INJECTION OF ANESTHETIC AGENT AROUND NERVE Right 1/29/2025    Procedure: INJECTION, RIGHT ACRIO CLAVICULAR JOINT;  Surgeon: Karena Davidson MD;  Location: [facility] PAIN MGT;  Service: Pain Management;  Laterality: Right;  2 wk f/u rodrick    INJECTION OF ANESTHETIC AGENT AROUND NERVE Right 3/18/2025    Procedure: INJECTION, RIGHT BICEP TENDON UNDER ULTRASOUND;  Surgeon: Karena Davidson MD;  Location: [facility] PAIN MGT;  Service: Pain Management;  Laterality: Right;  2 WK F/U RODRICK     FAMILY HISTORY:   Family History   Problem Relation Name Age of Onset    Melanoma Mother Danya     Basal cell carcinoma Mother Danya     Cancer Mother Danya     Hypertension Mother Danya     Miscarriages / Stillbirths Mother Danya     Skin cancer Brother  30    Heart attack Maternal Grandfather  60    Colon cancer Maternal Uncle  45    Arthritis Maternal Grandmother M     Heart disease Maternal  "Grandmother M      SOCIAL HISTORY: Social History[1]    MEDICATIONS: Current Medications[2]  ALLERGIES: Review of patient's allergies indicates:  No Known Allergies    VITAL SIGNS: /82 (BP Location: Right arm, Patient Position: Sitting)   Pulse 67   Ht 5' 3" (1.6 m)   Wt 56.5 kg (124 lb 9 oz)   BMI 22.06 kg/m²      Risks, indications and benefits of the surgical procedure were discussed with the patient. All questions with regard to surgery, rehab, expected return to functional activities, activities of daily living and recreational endeavors were answered to her satisfaction.    It was explained to the patient that there may be an increase in surgical risks if the patient has certain co-morbidities such as but not limited to: Obesity, Cardiovascular issues (CHF, CAD, Arrhythmias), chronic pulmonary issues, previous or current neurovascular/neurological issues, previous strokes, diabetes mellitus, previous wound healing issues, previous wound or skin infections, PVD, clotting disorders, if the patient uses chronic steroids, if the patient takes or has immune compromising medications or diseases, or has previously or currently used tobacco products.     The patient verbalized that he/she does not have any additional clotting, bleeding, or blood disorders, other than what is list in her chart on today's review.     Then a brief history and physical exam were performed.    Review of Systems   Constitution: Negative. Negative for chills, fever and night sweats.   HENT: Negative for congestion and headaches.    Eyes: Negative for blurred vision, left vision loss and right vision loss.   Cardiovascular: Negative for chest pain and syncope.   Respiratory: Negative for cough and shortness of breath.    Endocrine: Negative for polydipsia, polyphagia and polyuria.   Hematologic/Lymphatic: Negative for bleeding problem. Does not bruise/bleed easily.   Skin: Negative for dry skin, itching and rash.   Musculoskeletal: " Negative for falls and muscle weakness.   Gastrointestinal: Negative for abdominal pain and bowel incontinence.   Genitourinary: Negative for bladder incontinence and nocturia.   Neurological: Negative for disturbances in coordination, loss of balance and seizures.   Psychiatric/Behavioral: Negative for depression. The patient does not have insomnia.    Allergic/Immunologic: Negative for hives and persistent infections.     PHYSICAL EXAM:  GEN: A&Ox3, WD WN NAD  HEENT: WNL  CHEST: CTAB, no W/R/R  HEART: RRR, no M/R/G  ABD: Soft, NT ND, BS x4 QUADS  MS; See Epic  NEURO: CN II-XII intact       The surgical consent was then reviewed with the patient, who agreed with all the contents of the consent form and it was signed. she was then given the [facility] surgery packet to bring with her to surgery for the anesthesia portion of her perioperative paperwork.   For all physicians except for Dr. Sotelo, we will email and possibly fax the consent forms and booking sheets to [facility] pre-admit.    The patient was given the opportunity to ask questions about the surgical plan and consent form, and once no other questions were asked, I proceeded with the pre-op appointment.    PHYSICAL THERAPY:  She was also instructed regarding physical therapy and will begin on POD#3-5 at the [facility] location.     POST OP CARE:instructions were reviewed including care of the wound and dressing after surgery and when she can shower.     CRUTCHES OR WALKER: It was explained to the patient that if they are having a lower extremity surgery that they will require either a walker or crutches to ambulate safely with after surgery. It was explained that a cane or other assistive devices are not sufficient to safely ambulate with after surgery. I explained to the patient that I will place an order for them to receive either crutches or a walker after surgery to go home with. It was explained that if they have  crutches or a walker at home already, that they are REQUIRED to bring them to the hospital on the day of surgery. It was explained that if they do not have them at the hospital on the day of surgery that they WILL be provided a new pair or crutches or a walker to go home with to ensure ambulation will be safe if the patient needs to stop somewhere on the way home.      PAIN MANAGEMENT: Devora Meeks was also given their pain management regimen, which includes the TENS unit given to her by [facility] along with the education required for its use.    PAIN MEDICATION:  Percocet 10/325mg 1 po q 4-6 hours prn pain  Ultram 50 mg Take 1-2 p.o. q.6 hours p.r.n. breakthrough pain,   Phenergan 25 mg one p.o. q.6 hours p.r.n. nausea and vomiting.    Post op meds to be delivered bedside prior to discharge. Deliver to family if patient is in surgery at 5pm.    The patient was told that narcotic pain medications may make them drowsy and instructions were given to not sign legal documents, drive or operate heavy machinery, cars, or equipment while under the influence of narcotic medications. The patient was told and understands that narcotic pain medications should only be used as needed to control pain and that other options of pain control include TENs unit and ice packs/unit.     Patient was instructed to purchase and take Colace to counter possible GI side effects of taking opiates.     DVT prophylaxis was discussed with the patient today including risk factors for developing DVTs and history of DVTs. The patient was asked if any specific recommendations were given from the doctor/s that did pre-operative surgical clearance. The patient was then given an education sheet about DVTs and PE with warning signs and symptoms of both and steps to take if they suspect either of these.    Patient was asked if they were taking or using OCP pills or devices. If they answered yes, then they were instructed to stop using OCPs at  this pre-operative appointment until 2 months post-op to help prevent DVT development. They understand that there are other forms of birth control that do not involve hormones. They expressed understanding that ignoring/not following this instruction could result in a DVT which could turn into a deadly pulmonary embolism.     This along with the Modified Caprini risk assessment model for VTE in general surgical patients was used to determine the patient's DVT risk.     From: Charmaine MK, Pancho DA, Polina SM, et al. Prevention of VTE in nonorthopedic surgical patients: antithrombotic therapy and prevention of thrombosis, 9th ed: American College of Chest Physicians evidence-based clinical practical guidelines. Chest 2012; 141:e227S. Copyright © 2012. Reproduced with permission from the American College of Chest Physicians.    The below listed DVT prophylaxis regimen was discussed along with SCDs during surgery and bilateral CARMEN compression stockings to be used post-op. Length of treatment has been determined to be 10-42 days post-op by the above noted Caprini assessment model. Early ambulation post-op was also discussed and emphasized with the patient.     The patient was instructed to buy and take:  Aspirin 81mg BID x 2 weeks for DVT prophylaxis starting on the morning after surgery.  Patient will also use bilateral TEDs on lower extremities, SCDs during surgery, and early ambulation post-op. If the patient was previously taking 81mg baby aspirin, they were told to not take it starting 5 days prior to surgery and to restart the 81mg aspirin after surgery.       Patient was also told to buy over the counter Prilosec medication if needed and take it once daily for GI protection as long as they are taking NSAIDs or Aspirin.    Patient denies history of seizures.     I explained to following and the patient expressed understanding:  The patient is currently aware of the COVID19 pandemic and that proceeding with their  surgical procedure could potentially increase exposure to coronavirus in the community. The patient understands that there is the possibility of delayed or cancelled appts or PT visits in the future. They understand that infection with the coronavirus could complicate their surgery recovery. They are aware of the current policies and procedures of [facility] and the government regarding the pandemic and they were given the option of delaying my surgery. The patient elects to proceed with surgery at this time.     The patient was instructed to practice strict social distancing, hand washing/hygiene, respiratory hygiene, and cough etiquette from now until 6 weeks following surgery to reduce the risk of fili coronavirus.    As there were no other questions to be asked, she was given my business card along with Femi Sotelo MD business card if she has any questions or concerns prior to surgery or in the postop period.          [1]   Social History  Socioeconomic History    Marital status: Single   Occupational History    Occupation:      Comment: [employer]   Tobacco Use    Smoking status: Never    Smokeless tobacco: Never    Tobacco comments:     Occasional social smoking rarely   Substance and Sexual Activity    Alcohol use: Yes     Comment: occasionally    Drug use: Never    Sexual activity: Yes     Partners: Male     Birth control/protection: Condom, OCP     Social Drivers of Health     Financial Resource Strain: Low Risk  (2/24/2025)    Overall Financial Resource Strain (CARDIA)     Difficulty of Paying Living Expenses: Not very hard   Food Insecurity: No Food Insecurity (2/24/2025)    Hunger Vital Sign     Worried About Running Out of Food in the Last Year: Never true     Ran Out of Food in the Last Year: Never true   Transportation Needs: No Transportation Needs (2/24/2025)    PRAPARE - Transportation     Lack of Transportation (Medical): No     Lack of Transportation (Non-Medical): No    Physical Activity: Insufficiently Active (2/24/2025)    Exercise Vital Sign     Days of Exercise per Week: 3 days     Minutes of Exercise per Session: 40 min   Stress: Patient Declined (2/24/2025)    [employer] - Occupational Stress Questionnaire     Feeling of Stress : Patient declined   Housing Stability: Unknown (2/24/2025)    Housing Stability Vital Sign     Unable to Pay for Housing in the Last Year: Patient declined     Number of Times Moved in the Last Year: 1     Homeless in the Last Year: No   [2]   Current Outpatient Medications:     aspirin 81 MG Chew, Take 1 tablet (81 mg total) by mouth 2 (two) times a day., Disp: 28 tablet, Rfl: 0    busPIRone (BUSPAR) 15 MG tablet, Take 7.5 mg by mouth 2 (two) times daily., Disp: , Rfl:     butalbital-acetaminophen-caffeine -40 mg (FIORICET, ESGIC) -40 mg per tablet, Take 1 tablet by mouth every 6 (six) hours as needed for Headaches. Nto to betaken more than 2 days out of the week., Disp: 20 tablet, Rfl: 2    diazePAM (VALIUM) 5 MG tablet, Take 2 tablets (10 mg total) by mouth once. May repeat if necessary for 1 dose, Disp: 2 tablet, Rfl: 0    gabapentin (NEURONTIN) 300 MG capsule, Take 1 capsule (300 mg total) by mouth 2 (two) times daily., Disp: 180 capsule, Rfl: 3    HYDROcodone-acetaminophen (NORCO) 5-325 mg per tablet, Take 1 tablet by mouth every 8 (eight) hours as needed for Pain. (Patient not taking: Reported on 4/22/2025), Disp: 12 tablet, Rfl: 0    methylphenidate HCl (RITALIN) 5 MG tablet, Take 5 mg by mouth 2 (two) times daily., Disp: , Rfl:     norethindrone (MICRONOR) 0.35 mg tablet, Take 1 tablet (0.35 mg total) by mouth once daily., Disp: 90 tablet, Rfl: 3    oxyCODONE-acetaminophen (PERCOCET)  mg per tablet, Take 1 tablet by mouth every 6 (six) hours as needed for Pain., Disp: 28 tablet, Rfl: 0    promethazine (PHENERGAN) 25 MG tablet, Take 1 tablet (25 mg total) by mouth every 6 (six) hours as needed  for Nausea., Disp: 20 tablet, Rfl: 0    traMADoL (ULTRAM) 50 mg tablet, Take 1 tablet (50 mg total) by mouth every 6 (six) hours as needed for Pain., Disp: 20 tablet, Rfl: 0

## 2025-05-07 ENCOUNTER — ANESTHESIA (OUTPATIENT)
Dept: SURGERY | Facility: HOSPITAL | Age: 30
End: 2025-05-07
Payer: COMMERCIAL

## 2025-05-07 ENCOUNTER — TELEPHONE (OUTPATIENT)
Dept: SPORTS MEDICINE | Facility: CLINIC | Age: 30
End: 2025-05-07

## 2025-05-07 ENCOUNTER — HOSPITAL ENCOUNTER (OUTPATIENT)
Facility: HOSPITAL | Age: 30
Discharge: HOME | End: 2025-05-07
Attending: ORTHOPAEDIC SURGERY | Admitting: ORTHOPAEDIC SURGERY
Payer: COMMERCIAL

## 2025-05-07 VITALS
RESPIRATION RATE: 17 BRPM | HEART RATE: 75 BPM | SYSTOLIC BLOOD PRESSURE: 113 MMHG | DIASTOLIC BLOOD PRESSURE: 75 MMHG | OXYGEN SATURATION: 100 % | WEIGHT: 124 LBS | HEIGHT: 63 IN | BODY MASS INDEX: 21.97 KG/M2 | TEMPERATURE: 98 F

## 2025-05-07 DIAGNOSIS — M25.311 MULTIDIRECTIONAL INSTABILITY OF RIGHT GLENOHUMERAL JOINT: Primary | ICD-10-CM

## 2025-05-07 DIAGNOSIS — M25.311 MULTIDIRECTIONAL INSTABILITY OF RIGHT GLENOHUMERAL JOINT: ICD-10-CM

## 2025-05-07 DIAGNOSIS — M25.311 INSTABILITY OF RIGHT SHOULDER JOINT: ICD-10-CM

## 2025-05-07 LAB
B-HCG UR QL: NEGATIVE
CTP QC/QA: YES

## 2025-05-07 PROCEDURE — 37000009 HC ANESTHESIA EA ADD 15 MINS: Performed by: ORTHOPAEDIC SURGERY

## 2025-05-07 PROCEDURE — 63600175 PHARM REV CODE 636 W HCPCS: Performed by: ANESTHESIOLOGY

## 2025-05-07 PROCEDURE — 71000015 HC POSTOP RECOV 1ST HR: Performed by: ORTHOPAEDIC SURGERY

## 2025-05-07 PROCEDURE — 63600175 PHARM REV CODE 636 W HCPCS: Performed by: PHYSICIAN ASSISTANT

## 2025-05-07 PROCEDURE — 25000003 PHARM REV CODE 250: Performed by: PHYSICIAN ASSISTANT

## 2025-05-07 PROCEDURE — 37000008 HC ANESTHESIA 1ST 15 MINUTES: Performed by: ORTHOPAEDIC SURGERY

## 2025-05-07 PROCEDURE — C1751 CATH, INF, PER/CENT/MIDLINE: HCPCS | Performed by: ANESTHESIOLOGY

## 2025-05-07 PROCEDURE — 25000003 PHARM REV CODE 250: Performed by: NURSE ANESTHETIST, CERTIFIED REGISTERED

## 2025-05-07 PROCEDURE — 63600175 PHARM REV CODE 636 W HCPCS: Performed by: NURSE ANESTHETIST, CERTIFIED REGISTERED

## 2025-05-07 PROCEDURE — 81025 URINE PREGNANCY TEST: CPT | Performed by: ORTHOPAEDIC SURGERY

## 2025-05-07 PROCEDURE — 71000033 HC RECOVERY, INTIAL HOUR: Performed by: ORTHOPAEDIC SURGERY

## 2025-05-07 PROCEDURE — 71000016 HC POSTOP RECOV ADDL HR: Performed by: ORTHOPAEDIC SURGERY

## 2025-05-07 PROCEDURE — 99900035 HC TECH TIME PER 15 MIN (STAT)

## 2025-05-07 PROCEDURE — 29806 SHO ARTHRS SRG CAPSULORRAPHY: CPT | Mod: 22,RT,, | Performed by: ORTHOPAEDIC SURGERY

## 2025-05-07 PROCEDURE — 99499 UNLISTED E&M SERVICE: CPT | Mod: ,,, | Performed by: STUDENT IN AN ORGANIZED HEALTH CARE EDUCATION/TRAINING PROGRAM

## 2025-05-07 PROCEDURE — 64416 NJX AA&/STRD BRCH PL NFS IMG: CPT | Performed by: ANESTHESIOLOGY

## 2025-05-07 PROCEDURE — 36000711: Performed by: ORTHOPAEDIC SURGERY

## 2025-05-07 PROCEDURE — C1713 ANCHOR/SCREW BN/BN,TIS/BN: HCPCS | Performed by: ORTHOPAEDIC SURGERY

## 2025-05-07 PROCEDURE — 94761 N-INVAS EAR/PLS OXIMETRY MLT: CPT

## 2025-05-07 PROCEDURE — 63600175 PHARM REV CODE 636 W HCPCS: Performed by: ORTHOPAEDIC SURGERY

## 2025-05-07 PROCEDURE — 27201423 OPTIME MED/SURG SUP & DEVICES STERILE SUPPLY: Performed by: ORTHOPAEDIC SURGERY

## 2025-05-07 PROCEDURE — 36000710: Performed by: ORTHOPAEDIC SURGERY

## 2025-05-07 DEVICE — SUTR ANCH,BIO-COMP P-LCK,2.9X 12.5MM
Type: IMPLANTABLE DEVICE | Site: SHOULDER | Status: FUNCTIONAL
Brand: ARTHREX®

## 2025-05-07 DEVICE — KL 1.8 FIBERTAK, SHOULDER
Type: IMPLANTABLE DEVICE | Site: SHOULDER | Status: FUNCTIONAL
Brand: ARTHREX®

## 2025-05-07 RX ORDER — FENTANYL CITRATE 50 UG/ML
INJECTION, SOLUTION INTRAMUSCULAR; INTRAVENOUS
Status: DISCONTINUED | OUTPATIENT
Start: 2025-05-07 | End: 2025-05-07

## 2025-05-07 RX ORDER — OXYCODONE HYDROCHLORIDE 5 MG/1
10 TABLET ORAL EVERY 4 HOURS PRN
Status: DISCONTINUED | OUTPATIENT
Start: 2025-05-07 | End: 2025-05-07 | Stop reason: HOSPADM

## 2025-05-07 RX ORDER — ACETAMINOPHEN 500 MG
1000 TABLET ORAL
Status: COMPLETED | OUTPATIENT
Start: 2025-05-07 | End: 2025-05-07

## 2025-05-07 RX ORDER — MIDAZOLAM HYDROCHLORIDE 1 MG/ML
1 INJECTION, SOLUTION INTRAMUSCULAR; INTRAVENOUS
Status: DISCONTINUED | OUTPATIENT
Start: 2025-05-07 | End: 2025-05-07 | Stop reason: HOSPADM

## 2025-05-07 RX ORDER — CELECOXIB 200 MG/1
400 CAPSULE ORAL
Status: COMPLETED | OUTPATIENT
Start: 2025-05-07 | End: 2025-05-07

## 2025-05-07 RX ORDER — ONDANSETRON HYDROCHLORIDE 2 MG/ML
4 INJECTION, SOLUTION INTRAVENOUS EVERY 12 HOURS PRN
Status: DISCONTINUED | OUTPATIENT
Start: 2025-05-07 | End: 2025-05-07 | Stop reason: HOSPADM

## 2025-05-07 RX ORDER — EPINEPHRINE 1 MG/ML
INJECTION, SOLUTION, CONCENTRATE INTRAVENOUS
Status: DISCONTINUED | OUTPATIENT
Start: 2025-05-07 | End: 2025-05-07 | Stop reason: HOSPADM

## 2025-05-07 RX ORDER — MORPHINE SULFATE 2 MG/ML
2 INJECTION, SOLUTION INTRAMUSCULAR; INTRAVENOUS EVERY 10 MIN PRN
Status: DISCONTINUED | OUTPATIENT
Start: 2025-05-07 | End: 2025-05-07 | Stop reason: HOSPADM

## 2025-05-07 RX ORDER — ROCURONIUM BROMIDE 10 MG/ML
INJECTION, SOLUTION INTRAVENOUS
Status: DISCONTINUED | OUTPATIENT
Start: 2025-05-07 | End: 2025-05-07

## 2025-05-07 RX ORDER — ONDANSETRON HYDROCHLORIDE 2 MG/ML
INJECTION, SOLUTION INTRAVENOUS
Status: DISCONTINUED | OUTPATIENT
Start: 2025-05-07 | End: 2025-05-07

## 2025-05-07 RX ORDER — SODIUM CHLORIDE 9 MG/ML
INJECTION, SOLUTION INTRAVENOUS CONTINUOUS
Status: DISCONTINUED | OUTPATIENT
Start: 2025-05-07 | End: 2025-05-07 | Stop reason: HOSPADM

## 2025-05-07 RX ORDER — HALOPERIDOL LACTATE 5 MG/ML
0.5 INJECTION, SOLUTION INTRAMUSCULAR EVERY 10 MIN PRN
Status: DISCONTINUED | OUTPATIENT
Start: 2025-05-07 | End: 2025-05-07 | Stop reason: HOSPADM

## 2025-05-07 RX ORDER — LIDOCAINE HYDROCHLORIDE 20 MG/ML
INJECTION INTRAVENOUS
Status: DISCONTINUED | OUTPATIENT
Start: 2025-05-07 | End: 2025-05-07

## 2025-05-07 RX ORDER — ONDANSETRON HYDROCHLORIDE 2 MG/ML
4 INJECTION, SOLUTION INTRAVENOUS DAILY PRN
Status: DISCONTINUED | OUTPATIENT
Start: 2025-05-07 | End: 2025-05-07 | Stop reason: HOSPADM

## 2025-05-07 RX ORDER — PROMETHAZINE HYDROCHLORIDE 25 MG/1
25 TABLET ORAL EVERY 6 HOURS PRN
Status: DISCONTINUED | OUTPATIENT
Start: 2025-05-07 | End: 2025-05-07 | Stop reason: HOSPADM

## 2025-05-07 RX ORDER — ROPIVACAINE HYDROCHLORIDE 5 MG/ML
INJECTION, SOLUTION EPIDURAL; INFILTRATION; PERINEURAL
Status: COMPLETED | OUTPATIENT
Start: 2025-05-07 | End: 2025-05-07

## 2025-05-07 RX ORDER — TRAMADOL HYDROCHLORIDE 50 MG/1
100 TABLET ORAL EVERY 6 HOURS PRN
Status: DISCONTINUED | OUTPATIENT
Start: 2025-05-07 | End: 2025-05-07 | Stop reason: HOSPADM

## 2025-05-07 RX ORDER — METHOCARBAMOL 500 MG/1
1000 TABLET, FILM COATED ORAL ONCE
Status: DISCONTINUED | OUTPATIENT
Start: 2025-05-07 | End: 2025-05-07 | Stop reason: HOSPADM

## 2025-05-07 RX ORDER — GLUCAGON 1 MG
1 KIT INJECTION
Status: DISCONTINUED | OUTPATIENT
Start: 2025-05-07 | End: 2025-05-07 | Stop reason: HOSPADM

## 2025-05-07 RX ORDER — PROPOFOL 10 MG/ML
VIAL (ML) INTRAVENOUS
Status: DISCONTINUED | OUTPATIENT
Start: 2025-05-07 | End: 2025-05-07

## 2025-05-07 RX ORDER — KETAMINE HYDROCHLORIDE 100 MG/ML
INJECTION, SOLUTION INTRAMUSCULAR; INTRAVENOUS
Status: DISCONTINUED | OUTPATIENT
Start: 2025-05-07 | End: 2025-05-07

## 2025-05-07 RX ORDER — ROPIVACAINE HYDROCHLORIDE 2 MG/ML
INJECTION, SOLUTION EPIDURAL; INFILTRATION; PERINEURAL CONTINUOUS
Status: DISCONTINUED | OUTPATIENT
Start: 2025-05-07 | End: 2025-05-07 | Stop reason: HOSPADM

## 2025-05-07 RX ORDER — DEXAMETHASONE SODIUM PHOSPHATE 4 MG/ML
INJECTION, SOLUTION INTRA-ARTICULAR; INTRALESIONAL; INTRAMUSCULAR; INTRAVENOUS; SOFT TISSUE
Status: DISCONTINUED | OUTPATIENT
Start: 2025-05-07 | End: 2025-05-07

## 2025-05-07 RX ORDER — SODIUM CHLORIDE 0.9 % (FLUSH) 0.9 %
10 SYRINGE (ML) INJECTION
Status: DISCONTINUED | OUTPATIENT
Start: 2025-05-07 | End: 2025-05-07 | Stop reason: HOSPADM

## 2025-05-07 RX ORDER — EPHEDRINE SULFATE 50 MG/ML
INJECTION, SOLUTION INTRAVENOUS
Status: DISCONTINUED | OUTPATIENT
Start: 2025-05-07 | End: 2025-05-07

## 2025-05-07 RX ORDER — FENTANYL CITRATE 50 UG/ML
100 INJECTION, SOLUTION INTRAMUSCULAR; INTRAVENOUS
Status: DISCONTINUED | OUTPATIENT
Start: 2025-05-07 | End: 2025-05-07 | Stop reason: HOSPADM

## 2025-05-07 RX ORDER — CEFAZOLIN 2 G/1
2 INJECTION, POWDER, FOR SOLUTION INTRAMUSCULAR; INTRAVENOUS
Status: COMPLETED | OUTPATIENT
Start: 2025-05-07 | End: 2025-05-07

## 2025-05-07 RX ADMIN — PROPOFOL 160 MG: 10 INJECTION, EMULSION INTRAVENOUS at 07:05

## 2025-05-07 RX ADMIN — MIDAZOLAM HYDROCHLORIDE 2 MG: 1 INJECTION, SOLUTION INTRAMUSCULAR; INTRAVENOUS at 06:05

## 2025-05-07 RX ADMIN — CEFAZOLIN 2 G: 2 INJECTION, POWDER, FOR SOLUTION INTRAMUSCULAR; INTRAVENOUS at 07:05

## 2025-05-07 RX ADMIN — FENTANYL CITRATE 50 MCG: 50 INJECTION, SOLUTION INTRAMUSCULAR; INTRAVENOUS at 07:05

## 2025-05-07 RX ADMIN — FENTANYL CITRATE 100 MCG: 50 INJECTION INTRAMUSCULAR; INTRAVENOUS at 06:05

## 2025-05-07 RX ADMIN — SUGAMMADEX 200 MG: 100 INJECTION, SOLUTION INTRAVENOUS at 09:05

## 2025-05-07 RX ADMIN — EPHEDRINE SULFATE 5 MG: 50 INJECTION INTRAVENOUS at 07:05

## 2025-05-07 RX ADMIN — ROPIVACAINE HYDROCHLORIDE 7.5 ML: 5 INJECTION EPIDURAL; INFILTRATION; PERINEURAL at 06:05

## 2025-05-07 RX ADMIN — KETAMINE HYDROCHLORIDE 25 MG: 100 INJECTION INTRAMUSCULAR; INTRAVENOUS at 09:05

## 2025-05-07 RX ADMIN — CELECOXIB 400 MG: 200 CAPSULE ORAL at 05:05

## 2025-05-07 RX ADMIN — SODIUM CHLORIDE: 9 INJECTION, SOLUTION INTRAVENOUS at 05:05

## 2025-05-07 RX ADMIN — Medication: at 09:05

## 2025-05-07 RX ADMIN — ONDANSETRON 4 MG: 2 INJECTION INTRAMUSCULAR; INTRAVENOUS at 09:05

## 2025-05-07 RX ADMIN — ONDANSETRON 4 MG: 2 INJECTION INTRAMUSCULAR; INTRAVENOUS at 10:05

## 2025-05-07 RX ADMIN — ACETAMINOPHEN 1000 MG: 500 TABLET ORAL at 05:05

## 2025-05-07 RX ADMIN — DEXAMETHASONE SODIUM PHOSPHATE 4 MG: 4 INJECTION, SOLUTION INTRAMUSCULAR; INTRAVENOUS at 07:05

## 2025-05-07 RX ADMIN — ROCURONIUM BROMIDE 50 MG: 10 INJECTION, SOLUTION INTRAVENOUS at 07:05

## 2025-05-07 RX ADMIN — SODIUM CHLORIDE, SODIUM GLUCONATE, SODIUM ACETATE, POTASSIUM CHLORIDE, MAGNESIUM CHLORIDE, SODIUM PHOSPHATE, DIBASIC, AND POTASSIUM PHOSPHATE: .53; .5; .37; .037; .03; .012; .00082 INJECTION, SOLUTION INTRAVENOUS at 07:05

## 2025-05-07 RX ADMIN — SODIUM CHLORIDE: 0.9 INJECTION, SOLUTION INTRAVENOUS at 07:05

## 2025-05-07 RX ADMIN — HALOPERIDOL LACTATE 0.5 MG: 5 INJECTION, SOLUTION INTRAMUSCULAR at 10:05

## 2025-05-07 RX ADMIN — EPHEDRINE SULFATE 5 MG: 50 INJECTION INTRAVENOUS at 08:05

## 2025-05-07 RX ADMIN — LIDOCAINE HYDROCHLORIDE 60 MG: 20 INJECTION INTRAVENOUS at 07:05

## 2025-05-07 NOTE — OP NOTE
[facility])  Surgery Department  Operative Note    SUMMARY     Date of Procedure: 5/7/2025     Procedure: Procedure(s) (LRB):  ARTHROSCOPY, SHOULDER,W/ CAPSULORRHAPHY (Right)     Surgeons and Role:     * Femi Stoelo MD - Primary    First Assistant:  Michael Haas DO      **The use of a first assistant was necessary for positioning, arthroscopic assistance, suture placement, tissue mobilization and handling, anchor implantation, intraoperative decision making and closure.  The case could not have been done without the use of a qualified first assistant.       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 Right shoulder instability.   2. Right shoulder capsular laxity    POSTOPERATIVE DIAGNOSES:   1. Right shoulder instability.   2. Right shoulder capsular laxity    PROCEDURES:   1. Right shoulder arthroscopic balanced plication / inferior capsullorrhaphy.    SURGEON: Femi Sotelo M.D.     First Assistant:  Michael Haas DO      **The use of a first assistant was necessary for positioning, arthroscopic assistance, suture placement, tissue mobilization and handling, anchor implantation, intraoperative decision making and closure.  The case could not have been done without the use of a qualified first assistant.       Assistant:  Michael Weiss     COMPLICATIONS: None.     POSITION: Lateral decubitus.     ANESTHESIA: General endotracheal plus right upper extremity interscalene block.     COMPLICATIONS: None.     EXAMINATION UNDER ANESTHESIA: Right shoulder full range of motion, grade   2+ anterior drawer, 2+ posterior drawer, 2+ sulcus sign, which reduced to    1+ in external rotation.     ARTHROSCOPIC FINDINGS:   1. A 180 degree inferior labral insufficiency from 3 o'clock anterior to 9 o'clock posterior.   2. A patulous inferior capsule.   3. Intact superior labrum.   4. Intact rotator cuff.   5. Intact rotator interval.   6. Intact biceps tendon.     IMPLANT USED:   1. Arthrex Suturetape with Pushlock anchor x 1 inferior (6 o'clock)  2. Arthrex Knotless Fibertak anchors x 6 (3 anterior, 3 posterior)    INDICATIONS: The patient is a 30-year-old female who sustained and injury to her right shoulder. Clinical exam confirms multi-directional instability with primarily anterior instabilty and and MRI shows no tear but with relative inferior labral insufficiency and capsular laxity.  After failing extensive nonoperative treatment and therapy, the risks and benefits were discussed and she elected to proceed with operative intervention.     PROCEDURE IN DETAIL: The patient brought into the room. After undergoing general endotracheal anesthesia and a right upper extremity interscalene block, she was placed on a well-padded operating table. A beanbag positioner was used. Her right upper extremity was prepped and draped in the usual sterile fashion and then the patient was placed in a lateral   decubitus position with her right side up. A spider arm lu was used during the case. No more than 15 pounds of traction was used during the critical portions of the case. A sterile bump was used during the case.     The standard posterior portal and anterior superior lateral portal were \created. Diagnostic arthroscopy performed. The glenohumeral joint was entered. There was no significant glenoid or humeral head chondromalacia. There was insufficiency of the inferior labrum without an obvious tear extending from the 3 o'clock position anteriorly to the 9 o'clock position posteriorly. The biceps tendon was brought into the joint and found to be intact. The superior labrum was intact  and stable to probe. There was a sublabral foramen at the anterior superior portion of the labrum, which was an anatomic variant. The rotator cuff was intact.     CAPSULORRHAPHY: An accessory anterior inferior medial portal was created. Then, 3 cannulas were inserted. Liberator devices were used to release the labrum off the inferior portion of the glenoid in the area it was insufficient. An oscillating shaver was used to clean up the soft tissues in this area and prepare the bony bed. Fibers of the infraspinatus and the subscapularis were visualized underneath to demonstrate adequate mobilization of the labrum and inferior capsule. The patulous inferior capsule was noted to be in need of plication.     POSTERIOR / INFERIOR: The posterior labrum was plicated first. One SutureTape was passed in an inverted mattress fashion at the inferior most aspect of the capsule.  This was passed easily and was placed into a PushLock anchor placed at the 6-o'clock position.  This was drilled through the 7 o'clock portal directly onto the inferior face of the glenoid at 6.  The Pushlock anchor and tapes were brought up and excellent inferior capsular plication was achieved. Arthrex knotless FiberTak anchors were then inserted sequentially through the 7 o'clock portal giving us a total of three (3) posteriorly at 7, 8 and 9 o'clock positions. The first bite of tissue was placed posteroinferiorly, around the 7-o'clock position. A measured bite of inferior capsule was made along with the repair of the released posterior labrum. Two additional anchors was placed in the same manner.     ANTERIOR: Arthrex knotless FiberTak anchors were then inserted sequentially on the anterior glenoid face through the anteiror-inferior portal giving us a total of three (3) posteriorly at 5, 4 and 3 o'clock positions. The first bite of tissue was placed anterioinferiorly, around the 5-o'clock position. A measured bite of inferior capsule was made along  with the repair of the released anterior labrum. Two additional anchors was placed in the same manner.     After our reconstruction was complete, traction was taken down and our shoulder had improved stability and the humeral head with well balanced on the glenoid.      Portal sites were closed with 4-0 Monocryl, covered with Mastisol, Steri-Strips, Xeroform, 4 x 4, fluffs, ABD pads and tape. The patient was placed in a neutral sling and pillow for protection, was extubated in the room, transferred to Recovery Room in stable condition accompanied by her physician. There were no complications in the case.      Femi Sotelo MD       Description of the Findings of the Procedure: above    Significant Surgical Tasks Conducted by the Assistant(s), if Applicable: none    Complications: No    Estimated Blood Loss (EBL): * No values recorded between 5/7/2025  7:00 AM and 5/7/2025  9:43 AM *           Implants:   Implant Name Type Inv. Item Serial No.  Lot No. LRB No. Used Action   ANCHOR SUTURE PUSHLOK 2.9X12.5 - [id number]  ANCHOR SUTURE PUSHLOK 2.9X12.5  [employer] [id number] Right 1 Implanted   ANCHOR SUT FIBERTAK 1.8 KNTLS - [id number]  ANCHOR SUT FIBERTAK 1.8 KNTLS  [employer] [id number] Right 1 Implanted   ANCHOR SUT FIBERTAK 1.8 KNTLS - [id number]  ANCHOR SUT FIBERTAK 1.8 KNTLS  [employer]X [id number] Right 1 Implanted   ANCHOR SUT FIBERTAK 1.8 KNTLS - [id number]  ANCHOR SUT FIBERTAK 1.8 KNTLS  [employer]X [id number] Right 1 Implanted   ANCHOR SUT FIBERTAK 1.8 KNTLS - [id number]  ANCHOR SUT FIBERTAK 1.8 KNTLS  AR[employer]X [id number] Right 1 Implanted   ANCHOR SUT FIBERTAK 1.8 KNTLS - [id number]  ANCHOR SUT FIBERTAK 1.8 KNTLS  ARTHREX [id number] Right 2 Implanted       Specimens:   Specimen (24h ago, onward)      None                    Condition: Good    Disposition: PACU - hemodynamically stable.    Attestation: I was present and scrubbed for the entire procedure.    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     busPIRone 15 MG tablet  Commonly known as: BUSPAR  Take 7.5 mg by mouth 2 (two) times daily.     butalbital-acetaminophen-caffeine -40 mg -40 mg per tablet  Commonly known as: FIORICET, ESGIC  Take 1 tablet by mouth every 6 (six) hours as needed for Headaches. Nto to betaken more than 2 days out of the week.     gabapentin 300 MG capsule  Commonly known as: NEURONTIN  Take 1 capsule (300 mg total) by mouth 2 (two) times daily.     methylphenidate HCl 5 MG tablet  Commonly known as: RITALIN  Take 5 mg by mouth 2 (two) times daily.     norethindrone 0.35 mg tablet  Commonly known as: MICRONOR  Take 1 tablet (0.35 mg total) by mouth once daily.     oxyCODONE-acetaminophen  mg per tablet  Commonly known as: PERCOCET  Take 1 tablet by mouth every 6 (six) hours as needed for Pain.     promethazine 25 MG tablet  Commonly known as: PHENERGAN  Take 1 tablet (25 mg total) by mouth every 6 (six) hours as needed for Nausea.     traMADoL 50 mg tablet  Commonly known as: ULTRAM  Take 1 tablet (50 mg total) by mouth every 6 (six) hours as needed for Pain.            ASK your doctor about these medications      diazePAM 5 MG tablet  Commonly known as: VALIUM  Take 2 tablets (10 mg total) by mouth once. May repeat if necessary for 1 dose     HYDROcodone-acetaminophen 5-325 mg per tablet  Commonly known as: NORCO  Take 1  tablet by mouth every 8 (eight) hours as needed for Pain.            Discharge Procedure Orders   Diet general     Call MD for:  temperature >100.4     Call MD for:  persistent nausea and vomiting     Call MD for:  severe uncontrolled pain     Call MD for:  difficulty breathing, headache or visual disturbances     Call MD for:  redness, tenderness, or signs of infection (pain, swelling, redness, odor or green/yellow discharge around incision site)     Call MD for:  hives     Call MD for:  persistent dizziness or light-headedness

## 2025-05-07 NOTE — PLAN OF CARE
Discharge instructions given and explained to patient and family with verbalization of understanding all instructions. Patient is AAOx3,v/s stable, denies n/v and tolerating po, rates pain level tolerable, IV removed, and family at bedside. Patient discharged to home in sling with ice/meds/pain pump. Patient transported off unit via wheelchair to private vehicle with family.

## 2025-05-07 NOTE — ANESTHESIA PROCEDURE NOTES
Right interscalene catheter    Patient location during procedure: pre-op   Block not for primary anesthetic.  Reason for block: at surgeon's request and post-op pain management   Post-op Pain Location: Right shoulder pain   Start time: 5/7/2025 6:41 AM  Timeout: 5/7/2025 6:40 AM   End time: 5/7/2025 6:55 AM    Staffing  Authorizing Provider: Ally Trujillo MD  Performing Provider: Ally Trujillo MD    Staffing  Performed by: Ally Trujillo MD  Authorized by: Ally Trujillo MD    Preanesthetic Checklist  Completed: patient identified, IV checked, site marked, risks and benefits discussed, surgical consent, monitors and equipment checked, pre-op evaluation and timeout performed  Peripheral Block  Patient position: sitting  Prep: ChloraPrep and site prepped and draped  Patient monitoring: heart rate, cardiac monitor, continuous pulse ox, continuous capnometry and frequent blood pressure checks  Block type: interscalene  Laterality: right  Injection technique: continuous  Needle  Needle type: Tuohy   Needle gauge: 18 G  Needle length: 2 in  Needle localization: anatomical landmarks and ultrasound guidance  Catheter type: non-stimulating  Catheter size: 20 G  Test dose: lidocaine 1.5% with Epi 1-to-200,000 and negative   -ultrasound image captured on disc.  Assessment  Injection assessment: negative aspiration, negative parasthesia and local visualized surrounding nerve  Paresthesia pain: none  Heart rate change: no  Slow fractionated injection: yes  Pain Tolerance: comfortable throughout block and no complaints  Medications:    Medications: ropivacaine (NAROPIN) injection 0.5% - Perineural   7.5 mL - 5/7/2025 6:52:00 AM    Additional Notes  VSS.  [facility] RN monitoring vitals throughout procedure.  Patient tolerated procedure well.     7.5mL normal saline, 1:200,000 epi

## 2025-05-07 NOTE — TRANSFER OF CARE
"Anesthesia Transfer of Care Note    Patient: Devora Meeks    Procedure(s) Performed: Procedure(s) (LRB):  ARTHROSCOPY, SHOULDER,W/ CAPSULORRHAPHY (Right)    Patient location: PACU    Anesthesia Type: general    Transport from OR: Transported from OR on 6-10 L/min O2 by face mask with adequate spontaneous ventilation    Post pain: adequate analgesia    Post assessment: no apparent anesthetic complications    Post vital signs: stable    Level of consciousness: sedated    Nausea/Vomiting: no nausea/vomiting    Complications: none    Transfer of care protocol was followed      Last vitals: Visit Vitals  /62 (BP Location: Left arm, Patient Position: Lying)   Pulse 70   Temp 36.4 °C (97.5 °F) (Temporal)   Resp 16   Ht 5' 3" (1.6 m)   Wt 56.2 kg (124 lb)   LMP 05/03/2025 (Exact Date)   SpO2 100%   Breastfeeding No   BMI 21.97 kg/m²     "

## 2025-05-07 NOTE — ANESTHESIA PROCEDURE NOTES
Intubation    Date/Time: 5/7/2025 7:15 AM    Performed by: Anastasia Deleon CRNA  Authorized by: Ally Trujillo MD    Intubation:     Induction:  Intravenous    Intubated:  Postinduction    Mask Ventilation:  Easy mask    Attempts:  1    Attempted By:  Student    Method of Intubation:  Direct    Blade:  Condon 2    Laryngeal View Grade: Grade I - full view of cords      Difficult Airway Encountered?: No      Complications:  None    Airway Device:  Oral endotracheal tube    Airway Device Size:  7.0    Style/Cuff Inflation:  Cuffed    Tube secured:  21    Secured at:  The lips    Placement Verified By:  Capnometry    Complicating Factors:  None    Findings Post-Intubation:  BS equal bilateral

## 2025-05-07 NOTE — OPERATIVE NOTE ADDENDUM
Certification of Assistant at Surgery       Surgery Date: 5/7/2025     Participating Surgeons:  Surgeons and Role:     * Femi Sotelo MD - Primary     * Michael Haas MD - Resident - Assisting    Procedures:  Procedure(s) (LRB):  ARTHROSCOPY, SHOULDER,W/ CAPSULORRHAPHY (Right)    Assistant Surgeon's Certification of Necessity:  I understand that section 1842 (b) (6) (d) of the Social Security Act generally prohibits Medicare Part B reasonable charge payment for the services of assistants at surgery in [facility] when qualified residents are available to furnish such services. I certify that the services for which payment is claimed were medically necessary, and that no qualified resident was available to perform the services. I further understand that these services are subject to post-payment review by the Medicare carrier.      Michael Haas MD    05/07/2025  9:22 AM

## 2025-05-07 NOTE — ANESTHESIA PREPROCEDURE EVALUATION
05/07/2025  Devora Meeks is a 30 y.o., female.    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      butalbital-acetaminophen-caffeine -40 mg (FIORICET, ESGIC) -40 mg per tablet Take 1 tablet by mouth every 6 (six) hours as needed for Headaches. Nto to betaken more than 2 days out of the week.  Qty: 20 tablet, Refills: 2    Associated Diagnoses: Intractable migraine with aura without status migrainosus      gabapentin (NEURONTIN) 300 MG capsule Take 1 capsule (300 mg total) by mouth 2 (two) times daily.  Qty: 180 capsule, Refills: 3    Associated Diagnoses: Shoulder impingement syndrome, right      methylphenidate HCl (RITALIN) 5 MG tablet Take 5 mg by mouth 2 (two) times daily.      norethindrone (MICRONOR) 0.35 mg tablet Take 1 tablet (0.35 mg total) by mouth once daily.  Qty: 90 tablet, Refills: 3    Associated Diagnoses: Menorrhagia with irregular cycle      aspirin 81 MG Chew Chew and swallow 1 tablet (81 mg total) by mouth 2 (two) times a day.  Qty: 28 tablet, Refills: 0    Associated Diagnoses: Multidirectional instability of right glenohumeral joint      diazePAM (VALIUM) 5 MG tablet Take 2 tablets (10 mg total) by mouth once. May repeat if necessary for 1 dose  Qty: 2 tablet, Refills: 0    Associated Diagnoses: Instability of right shoulder joint      HYDROcodone-acetaminophen (NORCO) 5-325 mg per tablet Take 1 tablet by mouth every 8 (eight) hours as needed for Pain.  Qty: 12 tablet, Refills: 0      oxyCODONE-acetaminophen  (PERCOCET)  mg per tablet Take 1 tablet by mouth every 6 (six) hours as needed for Pain.  Qty: 28 tablet, Refills: 0    Associated Diagnoses: Multidirectional instability of right glenohumeral joint      promethazine (PHENERGAN) 25 MG tablet Take 1 tablet (25 mg total) by mouth every 6 (six) hours as needed for Nausea.  Qty: 20 tablet, Refills: 0    Associated Diagnoses: Multidirectional instability of right glenohumeral joint      traMADoL (ULTRAM) 50 mg tablet Take 1 tablet (50 mg total) by mouth every 6 (six) hours as needed for Pain.  Qty: 20 tablet, Refills: 0    Associated Diagnoses: Multidirectional instability of right glenohumeral joint             Pre-op Assessment    I have reviewed the Patient Summary Reports.     I have reviewed the Nursing Notes. I have reviewed the NPO Status.   I have reviewed the Medications.     Review of Systems  Anesthesia Hx:  No problems with previous Anesthesia   History of prior surgery of interest to airway management or planning:  Previous anesthesia: MAC       8/6/2024  Colonoscopy with MAC.  Procedure performed at an [facility].  Denies Family Hx of Anesthesia complications.    Denies Personal Hx of Anesthesia complications.                    Social:  Non-Smoker, Social Alcohol Use       Hematology/Oncology:    Oncology Normal    -- Anemia:               Hematology Comments: Mild iron deficiency anemia                    EENT/Dental:   Tinnitis right ear          Cardiovascular:  Cardiovascular Normal Exercise tolerance: good        Denies Dysrhythmias.   Denies Angina.       Denies JEAN-BAPTISTE.  ECG has been reviewed.  Patient not on beta blockers                          Pulmonary:  Pulmonary Normal   Denies COPD.  Denies Asthma.   Denies Shortness of breath.                  Renal/:  Renal/ Normal                 Hepatic/GI:  Hepatic/GI Normal     Denies GERD.    Not Taking GLP-1 Agonists            Musculoskeletal:  Arthritis   Multidirectional instability of  right glenohumeral joint,  Shoulder impingement syndrome, right,  Injection of anesthetic agent around nerve,  Biceps tendinopathy, S/P Injection into Biceps Tendon guided by US,  AC joint arthropathy, S/P injection,  Osteoarthritis of AC (acromioclavicular) joint             OB/GYN/PEDS:  H/O Abnormal pap smear of cervix, S/P Cervical Biopsy w/ LOOP Electrode Excision,  Endometriosis           Neurological:      Headaches Denies Seizures.    Migraine headaches,  Episode of Numbness and tingling of right side of face along with intractable migraine, seen by neurology,   9 mm pituitary adenoma,   Cervical radiculopathy at C6,        Chronic Pain Syndrome                         Endocrine:  Endocrine Normal Denies Diabetes. Denies Hypothyroidism.          Psych:  Psychiatric History anxiety  ADHD,  Insomnia             Physical Exam    Airway:  Mallampati: II / II  Mouth Opening: Normal  TM Distance: Normal  Tongue: Normal  Neck ROM: Normal ROM    Dental:  Intact      Past Medical History:   Diagnosis Date    Abnormal Pap smear of cervix     ADHD (attention deficit hyperactivity disorder)     Chronic shoulder pain     Migraine      Past Surgical History:   Procedure Laterality Date    CERVICAL BIOPSY  W/ LOOP ELECTRODE EXCISION N/A 2016    one abnormal after, clear since that time    COLONOSCOPY N/A 8/6/2024    Procedure: COLONOSCOPY;  Surgeon: Crispin Ardon MD;  Location: [address]);  Service: Endoscopy;  Laterality: N/A;    COLPOSCOPY      ESOPHAGOGASTRODUODENOSCOPY N/A 8/6/2024    Procedure: EGD (ESOPHAGOGASTRODUODENOSCOPY);  Surgeon: Crispin Ardon MD;  Location: [address]);  Service: Endoscopy;  Laterality: N/A;  6/14/24- case length adjusted - ERW    INJECTION OF ANESTHETIC AGENT AROUND NERVE Right 1/29/2025    Procedure: INJECTION, RIGHT ACRIO CLAVICULAR JOINT;  Surgeon: Karena Davidson MD;  Location: [facility];  Service: Pain Management;  Laterality: Right;  2 wk f/u rodrick     INJECTION OF ANESTHETIC AGENT AROUND NERVE Right 3/18/2025    Procedure: INJECTION, RIGHT BICEP TENDON UNDER ULTRASOUND;  Surgeon: Karena Davidson MD;  Location: [facility];  Service: Pain Management;  Laterality: Right;  2 WK F/U JAMES       Anesthesia Assessment: Preoperative EQUATION    Planned Procedure: Procedure(s) (LRB):  ARTHROSCOPY, SHOULDER,W/ CAPSULORRHAPHY (Right)  Requested Anesthesia Type:General  Surgeon: Femi Sotelo MD  Service: Orthopedics  Known or anticipated Date of Surgery:5/7/2025    Surgeon notes: reviewed    Electronic QUestionnaire Assessment completed via nurse interview with patient.        Triage considerations:     The patient has no apparent active cardiac condition (No unstable coronary Syndrome such as severe unstable angina or recent [<1 month] myocardial infarction, decompensated CHF, severe valvular   disease or significant arrhythmia)    Previous anesthesia records:MAC and No problems    Last PCP note: 3-6 months ago , within [facility]   Subspecialty notes: Pain Management    Other important co-morbidities: No co-morbidities noted.        EKG 12/7/2024:  Vent. Rate :  93 BPM     Atrial Rate :  93 BPM      P-R Int : 124 ms          QRS Dur :  84 ms       QT Int : 384 ms       P-R-T Axes :  60  81  66 degrees     QTcB Int : 477 ms   Normal sinus rhythm   Normal ECG   No previous ECGs available   Confirmed by Eduardo Lu ([id number]) on 12/8/2024 10:32:36 AM        Tests already available:  Results have been reviewed.             Instructions given. (See in Nurse's note) Preop medication instructions sent via portal message.     Optimization:  Anesthesia Preop Clinic Assessment Not Indicated    Medical Opinion Indicated: No       Sub-specialist consult indicated:  No      Plan:  Consultation:Medical clearance is not requested.         Navigation:  Straight Line to surgery.               No tests, anesthesia preop clinic visit, or consult required.                        Patient is  OK to proceed with surgery at [facility].       Ht: 5'3  Wt: 56.5 kg (124 lb)  BMI: 22.06    Anesthesia Plan  Type of Anesthesia, risks & benefits discussed:    Anesthesia Type: Gen ETT, Regional  Intra-op Monitoring Plan: Standard ASA Monitors  Post Op Pain Control Plan: multimodal analgesia, peripheral nerve block and IV/PO Opioids PRN  Induction:  IV  Airway Plan: Direct  Informed Consent: Informed consent signed with the Patient and all parties understand the risks and agree with anesthesia plan.  All questions answered.   ASA Score: 2    Ready For Surgery From Anesthesia Perspective.     .

## 2025-05-07 NOTE — DISCHARGE INSTRUCTIONS
"     [address], [place]                                    [phone]             Postoperative Instructions for Shoulder Surgery               Your Surgery Included:   Open              Arthroscopic [] Instability Repair     [] Diagnostic   [] Rotator Cuff Repair     [] Lysis of Adhesions / Manipulation [] Distal Clavicle Resection    [] Debridement [] Biceps Tenodesis       [] Labrum  [] Rotator Cuff   [] Cartilage   [] Contracture Release    [] SLAP Repair   [] Fracture Fixation    [x] Instability Repair  [] AC Joint Reconstruction      [] Rotator Cuff Repair [] Joint Replacement     [] Subacromial Decompression / Bursectomy   [] Hemiarthroplasty  [] Total Shoulder    [] Biceps Tenotomy / Tenodesis    [] Reverse Total Shoulder       [] Distal Clavicle Resection          [] Amniox Arthrocentesis    [] Contracture Release                 Call our office ([phone]) immediately or message through [facility] if you experience any of the following:      Excessive bleeding or pus like drainage at the incision site      Uncontrollable pain not relieved by pain medication      Excessive swelling or redness at the incision site      Fever above 101.5 degrees not controlled with Tylenol or Motrin      Shortness of Breath or severe calf pain      Any foul odor or blistering from the surgery site      Any "pop" with pain and/or deformity in the biceps muscle   FOR EMERGENCIES: [facility] is the best way to contact us. If on the weekend, page the  at [phone] who will direct your call appropriately.   1.   Pain Management: A cold therapy cuff, pain medications, local injections, TENs unit, and in some cases, regional anesthesia injections are used to manage your post-operative pain. The decision to use each of these options is based on their risks and benefits.    Medications: You were given one or more of the following medication prescriptions during your preoperative " "appointment. Follow the instructions on the bottles.     Narcotic Medication (usually Percocet, Norco, or Tramadol): Begin taking the medication before your shoulder starts to hurt. Some patients do not like to take any medication, but if you wait until your pain is severe before taking, you will be very uncomfortable for several hours waiting for the narcotic to work. Always take with food.    Nausea / Vomiting: For this issue, we prescribe Zofran or Phenergan, use this medication as directed.    Cold Therapy: You may have been sent home with a [employer] cold therapy unit and wrap for your shoulder. Fill with ice and water to the indicated fill line. You can use 20-30 minutes on then off, several times a day. This will help relieve pain and control swelling. Do not sleep with on.    Regional Anesthesia Injections (Blocks): You may have been given a regional nerve block either before or after surgery. This may make your entire shoulder numb for 2-5 days.                    2.   Diet: Eat a bland diet for the first day after surgery. Progress your diet as tolerated. Constipation may occur with Narcotic usage. We recommend Colace 100 mg twice a day while on narcotics.   3.   Activity: Spend most of the first 24 hours resting in bed, on the couch, or in a reclining chair. After the first 24 hours at home, slowly increase your activity level based on your symptoms. If a biceps tenodesis procedure was performed, avoid any aggressive lifting (over 5 lbs) or "corkscrew" motion for 6 weeks.                     4.   Dressing Change: (a) The soft, bulky dressing will be removed on the 3rd day after surgery. Place waterproof bandages at this time. Keep wounds as dry as possible for first 2 weeks. It is normal for some blood to be seen on the dressings. It is also normal for you to see apparent bruising on the skin around your incisions. If you are concerned by the drainage or the appearance of your wound site, you can send a " picture via [facility].   5.   Showering: (a) You may shower on the 3rd day after surgery. Place waterproof bandages prior to shower. It is recommended to use Saran wrap before showering. Do not submerge limb for 4 weeks or incisions completely healed in any water.    6.   Shoulder Sling (with/without Pillow attachment): You may have been sent home with a sling / pillow attachment holding your arm away from your body. You may remove the sling when changing clothes or bathing. Make sure to wear the sling while sleeping unless instructed otherwise. You may remove at rest or for exercises.           [x] You need to wear the sling with pillow for 24 hours a day for 6 weeks.   7.  Shoulder Exercises: Begin these exercises the first day after surgery in order to help you regain your motion and strength. You may do the following marked exercises for 2-5 mins five times a day:   [x] Shoulder shrugs - Shrug your shoulders up and down.   [x] Pendulums - Bend forward allowing your arm to hang down in front of you. Gently swing your arm side-to-side and front to back.                                                                                                                               [x] Passive abduction - Have a family member gently lift your arm away from your body bringing your elbow up to the level of your shoulder.                                                                                                                   [x] Shoulder rotation - With your arm at your side, have a family member gently rotate your arm internally and externally.                                                                                                                  [x] Scapular retractions - (Squeeze shoulder blades together): Squeeze shoulder blades together while slightly pulling them down (do not shrug your shoulders upward); You can perform 10-15 reps, several times throughout the day, when seated at your desk,  driving in the car, etc.                                                                                                                     [x] Pulley exercises - Put a towel or long sleeve shirt over the top of a door. Stand facing the door. Use your good arm to gently pull your operative arm up in front of you.   [x] Elbow motion - Straighten and bend your elbow.                                                                                                               [x] Ball squeezes - use ball attached to sling/pillow or soft (nerf) ball for  strengthening                                                                                                                 8.  Physical Therapy: Physical therapy is an essential component to your recovery from surgery. Your physical therapy will start in 3 days.   FIRST POSTOPERATIVE VISIT: As scheduled.

## 2025-05-07 NOTE — BRIEF OP NOTE
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    DISPOSITION: Home or Self Care    FINAL DIAGNOSIS:  Anterior/inferior glenohumeral instability    FOLLOWUP: In clinic    DISCHARGE INSTRUCTIONS:    Discharge Procedure Orders   Diet general     Call MD for:  temperature >100.4     Call MD for:  persistent nausea and vomiting     Call MD for:  severe uncontrolled pain     Call MD for:  difficulty breathing, headache or visual disturbances     Call MD for:  redness, tenderness, or signs of infection (pain, swelling, redness, odor or green/yellow discharge around incision site)     Call MD for:  hives     Call MD for:  persistent dizziness or light-headedness

## 2025-05-07 NOTE — ANESTHESIA POSTPROCEDURE EVALUATION
Anesthesia Post Evaluation    Patient: Devora Meeks    Procedure(s) Performed: Procedure(s) (LRB):  ARTHROSCOPY, SHOULDER,W/ CAPSULORRHAPHY (Right)    Final Anesthesia Type: general      Patient location during evaluation: PACU  Patient participation: Yes- Able to Participate  Level of consciousness: awake and alert and oriented  Post-procedure vital signs: reviewed and stable  Pain management: adequate  Airway patency: patent    PONV status at discharge: No PONV  Anesthetic complications: no      Cardiovascular status: hemodynamically stable  Respiratory status: nasal cannula  Hydration status: euvolemic  Follow-up not needed.          Vitals Value Taken Time   /75 05/07/25 11:32   Temp 36.4 °C (97.6 °F) 05/07/25 11:00   Pulse 74 05/07/25 11:37   Resp 20 05/07/25 11:37   SpO2 100 % 05/07/25 11:36   Vitals shown include unfiled device data.      Event Time   Out of Recovery 10:30:00         Pain/Kassie Score: Pain Rating Prior to Med Admin: 0 (5/7/2025  9:58 AM)  Kassie Score: 9 (5/7/2025 10:30 AM)

## 2025-05-07 NOTE — PLAN OF CARE
Pre op complete. Pt resting comfortably. Call light in reach. Pt mom at bedside,will take pt belongings. Need anesthesia consent, site didi and update.

## 2025-05-08 NOTE — ANESTHESIA POST-OP PAIN MANAGEMENT
Acute Pain Service Progress Note    5/8/2025 1017    Unable to reach patient for [facility] infusion follow up. Voicemail left on patient's cell number encouraging to contact anesthesia at [phone] or [facility] 24/7 support line at [phone] for any questions or concerns related to the nerve block or infusion pump.

## 2025-05-09 ENCOUNTER — CLINICAL SUPPORT (OUTPATIENT)
Dept: REHABILITATION | Facility: OTHER | Age: 30
End: 2025-05-09
Payer: COMMERCIAL

## 2025-05-09 DIAGNOSIS — M25.311 INSTABILITY OF RIGHT SHOULDER JOINT: ICD-10-CM

## 2025-05-09 DIAGNOSIS — R29.898 WEAKNESS OF RIGHT SHOULDER: Primary | ICD-10-CM

## 2025-05-09 DIAGNOSIS — M25.311 MULTIDIRECTIONAL INSTABILITY OF RIGHT GLENOHUMERAL JOINT: ICD-10-CM

## 2025-05-09 PROCEDURE — 97161 PT EVAL LOW COMPLEX 20 MIN: CPT | Mod: PN

## 2025-05-09 NOTE — PROGRESS NOTES
"    Outpatient Rehab    Physical Therapy Evaluation    Patient Name: Devora Meeks  MRN: [id number]  YOB: 1995  Encounter Date: 5/9/2025    Therapy Diagnosis:   Encounter Diagnoses   Name Primary?    Multidirectional instability of right glenohumeral joint     Instability of right shoulder joint     Weakness of right shoulder Yes     Physician: Kalpana Muñoz MD    Physician Orders: Eval and Treat  Medical Diagnosis: Multidirectional instability of right glenohumeral joint  Instability of right shoulder joint    Visit # / Visits Authorized:  1 / 1  Insurance Authorization Period: 5/7/2025 to 12/31/2025  Date of Evaluation: 5/9/2025  Plan of Care Certification: 5/9/2025 to 8/9/25     Time In: 1500   Time Out: 1545  Total Time (in minutes): 45   Total Billable Time (in minutes): 45    Intake Outcome Measure for FOTO Survey    Therapist reviewed FOTO scores for Devora Meeks on 5/9/2025.   FOTO report - see Media section or FOTO account episode details.     Intake Score:  %         Subjective   History of Present Illness  Devora is a 30 y.o. female who reports to physical therapy with a chief concern of R shoulder pain.                 Dominant Hand: Right  History of Present Condition/Illness: R labral repair 5/7/25 - Chronic right shoulder pain,  weakness, right upper extremity N&T. AM pain severe until the shoulder can be "popped" by creating an inferior glide of humeral head done by holding onto right knee and creating an inferior directed long axis distraction. Patient reports relief after pop. Patient also has right side sternocostal pain at around the level of the 4th rib. Also feels like she needs to pop the rib back into place after sleeping/doing computer work.         Past Medical History/Physical Systems Review:   Devora Meeks  has a past medical history of Abnormal Pap smear of cervix, ADHD (attention deficit hyperactivity disorder), Chronic shoulder pain, " and Migraine.    Devora Meeks  has a past surgical history that includes Cervical biopsy w/ loop electrode excision (N/A, 2016); Colposcopy; Colonoscopy (N/A, 8/6/2024); Esophagogastroduodenoscopy (N/A, 8/6/2024); Injection of anesthetic agent around nerve (Right, 1/29/2025); Injection of anesthetic agent around nerve (Right, 3/18/2025); and arthroscopy, shoulder, with capsulorrhaphy (Right, 5/7/2025).    Devora has a current medication list which includes the following prescription(s): aspirin, buspirone, butalbital-acetaminophen-caffeine -40 mg, diazepam, gabapentin, hydrocodone-acetaminophen, methylphenidate hcl, norethindrone, oxycodone-acetaminophen, promethazine, and tramadol.    Review of patient's allergies indicates:  No Known Allergies     Objective      Shoulder Range of Motion  Right Shoulder   Active (deg) Passive (deg) Pain   Flexion   90     Extension   0     Scaption         ABduction   45     ADduction         Horizontal ABduction         Horizontal ADduction         External Rotation (Shoulder ABducted 0 degrees)   0     External Rotation (Shoulder ABducted 45 degrees)         External Rotation (Shoulder ABducted 90 degrees)         Internal Rotation (Shoulder ABducted 0 degrees)   55     Internal Rotation (Shoulder ABducted 45 degrees)         Internal Rotation (Shoulder ABducted 90 degrees)                          Treatment:       Time Entry(in minutes):       Assessment & Plan   Assessment  Devora presents with a condition of Low complexity.   Presentation of Symptoms: Stable       Functional Limitations: Activity tolerance  Impairments: Impaired physical strength, Abnormal or restricted range of motion    Patient Goal for Therapy (PT): decrease pain, improve R shoulder ROM within protolol restrictions, progress functional ability  Prognosis: Good  Assessment Details: Patient is a 30 year old female presenting to physical therapy s/p right labral repair. Patient presents in  sling with nerve block placed. She complaint of right shoulder pain, limited range of motion, and decreased functional ability of right upper extremity. Devora will benefit from skilled physical therapy to address current deficits and return to prior level of function.     Plan  From a physical therapy perspective, the patient would benefit from: Skilled Rehab Services    Planned therapy interventions include: Therapeutic exercise, Therapeutic activities, Neuromuscular re-education, and Manual therapy.            Visit Frequency: 2 times Per Week for 6 Weeks.       This plan was discussed with Patient.   Discussion participants: Agreed Upon Plan of Care  Plan details: Shoulder stabilization, isometrics, ROM          Patient's spiritual, cultural, and educational needs considered and patient agreeable to plan of care and goals.           Goals:   Active       Functional outcome       Patient will show a significant change in FOTO patient-reported outcome tool to demonstrate subjective improvement       Start:  05/12/25    Expected End:  07/11/25            Patient will demonstrate independence in home program for support of progression       Start:  05/12/25    Expected End:  07/11/25               Leisure activities       Patient will participate in recreational activity without limitation.       Start:  05/12/25    Expected End:  07/11/25               Lifting & carrying objects       Patient will lift/carry up to 10 lbs to shoulder height without pain or limitation for return to work.       Start:  05/12/25    Expected End:  07/11/25               Pain       Patient will report pain of 3/10 demonstrating a reduction of overall pain       Start:  05/12/25    Expected End:  07/11/25               Range of Motion       Patient will improve right shoulder range of motion to WNL for improved right upper extremity function.       Start:  05/12/25    Expected End:  07/11/25               Strength       Patient will  demonstrate improved right shoulder strength to 4/5 with MMT for improved right shoulder function.       Start:  05/12/25    Expected End:  07/11/25                Andrés Fernandez, PT

## 2025-05-13 ENCOUNTER — CLINICAL SUPPORT (OUTPATIENT)
Dept: REHABILITATION | Facility: OTHER | Age: 30
End: 2025-05-13
Payer: COMMERCIAL

## 2025-05-13 DIAGNOSIS — R29.898 WEAKNESS OF RIGHT SHOULDER: Primary | ICD-10-CM

## 2025-05-13 PROCEDURE — 97112 NEUROMUSCULAR REEDUCATION: CPT | Mod: PN

## 2025-05-13 PROCEDURE — 97140 MANUAL THERAPY 1/> REGIONS: CPT | Mod: PN

## 2025-05-13 NOTE — PROGRESS NOTES
"    Outpatient Rehab    Physical Therapy Visit    Patient Name: Devora Meeks  MRN: [id number]  YOB: 1995  Encounter Date: 5/13/2025    Therapy Diagnosis:   Encounter Diagnosis   Name Primary?    Weakness of right shoulder Yes     Physician: Kalpana Muñoz MD    Physician Orders: Eval and Treat  Medical Diagnosis: Vertigo    Visit # / Visits Authorized:  6 / 20  Insurance Authorization Period: 1/30/2025 to 12/31/2025  Date of Evaluation: 5/9/2025  Plan of Care Certification:       PT/PTA:     Number of PTA visits since last PT visit:   Time In: 0900   Time Out: 1000  Total Time (in minutes): 60   Total Billable Time (in minutes): 40    FOTO:  Intake Score:  %  Survey Score 2:  %  Survey Score 3:  %    Precautions:       Subjective   Shoulder pain has been well controlled. She is due to take out the nerve block today..         Objective            Treatment:  Manual Therapy  MT 4: R shoulder PROM per protocol limitations 30 deg ER, 25 deg IR, abd to 45  Balance/Neuromuscular Re-Education  NMR 1: R elbow flexion/ext AAROM x10  NMR 2: R elbow pronation/supination at 90 deg flexion  NMR 3: R shoulder isometrics submaximal flex, ext, abd, add, ER 10x5"  Cold pack applied to right shoulder for 10 minutes    Time Entry(in minutes):  Manual Therapy Time Entry: 25  Neuromuscular Re-Education Time Entry: 15    Assessment & Plan   Assessment: Pt presented reported minimal pain. We took out the nerve block and dressed with band aid. There was no s/s of infection. PROM was well tolerated, and isometrics were performed at R shoulder. R elbow AROM was limited 2nd to muscle weakness which appeared to be due to nerve block. We will continue to progress per inferior labrum/capsulorrhaphy protocol.       Patient will continue to benefit from skilled outpatient physical therapy to address the deficits listed in the problem list box on initial evaluation, provide pt/family education and to maximize pt's level of " independence in the home and community environment.     Patient's spiritual, cultural, and educational needs considered and patient agreeable to plan of care and goals.           Plan: continue current POC    Goals:   Active       Functional outcome       Patient will show a significant change in FOTO patient-reported outcome tool to demonstrate subjective improvement       Start:  05/12/25    Expected End:  07/11/25            Patient will demonstrate independence in home program for support of progression       Start:  05/12/25    Expected End:  07/11/25               Leisure activities       Patient will participate in recreational activity without limitation.       Start:  05/12/25    Expected End:  07/11/25               Lifting & carrying objects       Patient will lift/carry up to 10 lbs to shoulder height without pain or limitation for return to work.       Start:  05/12/25    Expected End:  07/11/25               Pain       Patient will report pain of 3/10 demonstrating a reduction of overall pain       Start:  05/12/25    Expected End:  07/11/25               Range of Motion       Patient will improve right shoulder range of motion to WNL for improved right upper extremity function.       Start:  05/12/25    Expected End:  07/11/25               Strength       Patient will demonstrate improved right shoulder strength to 4/5 with MMT for improved right shoulder function.       Start:  05/12/25    Expected End:  07/11/25                Andrés Fernandez, PT

## 2025-05-15 DIAGNOSIS — M25.311 MULTIDIRECTIONAL INSTABILITY OF RIGHT GLENOHUMERAL JOINT: ICD-10-CM

## 2025-05-15 RX ORDER — OXYCODONE AND ACETAMINOPHEN 10; 325 MG/1; MG/1
1 TABLET ORAL EVERY 6 HOURS PRN
Qty: 28 TABLET | Refills: 0 | Status: SHIPPED | OUTPATIENT
Start: 2025-05-15

## 2025-05-16 ENCOUNTER — CLINICAL SUPPORT (OUTPATIENT)
Dept: REHABILITATION | Facility: OTHER | Age: 30
End: 2025-05-16
Payer: COMMERCIAL

## 2025-05-16 DIAGNOSIS — R29.898 WEAKNESS OF RIGHT SHOULDER: Primary | ICD-10-CM

## 2025-05-16 PROCEDURE — 97112 NEUROMUSCULAR REEDUCATION: CPT | Mod: PN

## 2025-05-16 PROCEDURE — 97140 MANUAL THERAPY 1/> REGIONS: CPT | Mod: PN

## 2025-05-19 ENCOUNTER — CLINICAL SUPPORT (OUTPATIENT)
Dept: REHABILITATION | Facility: OTHER | Age: 30
End: 2025-05-19
Payer: COMMERCIAL

## 2025-05-19 DIAGNOSIS — R29.898 WEAKNESS OF RIGHT SHOULDER: Primary | ICD-10-CM

## 2025-05-19 PROCEDURE — 97112 NEUROMUSCULAR REEDUCATION: CPT | Mod: PN

## 2025-05-19 PROCEDURE — 97140 MANUAL THERAPY 1/> REGIONS: CPT | Mod: PN

## 2025-05-21 ENCOUNTER — OFFICE VISIT (OUTPATIENT)
Dept: SPORTS MEDICINE | Facility: CLINIC | Age: 30
End: 2025-05-21
Payer: COMMERCIAL

## 2025-05-21 VITALS
SYSTOLIC BLOOD PRESSURE: 110 MMHG | BODY MASS INDEX: 21.79 KG/M2 | WEIGHT: 123 LBS | DIASTOLIC BLOOD PRESSURE: 81 MMHG | HEIGHT: 63 IN | HEART RATE: 89 BPM

## 2025-05-21 DIAGNOSIS — Z09 SURGERY FOLLOW-UP EXAMINATION: Primary | ICD-10-CM

## 2025-05-21 DIAGNOSIS — Z98.890 S/P ARTHROSCOPY OF RIGHT SHOULDER: ICD-10-CM

## 2025-05-21 PROCEDURE — 1159F MED LIST DOCD IN RCRD: CPT | Mod: CPTII,S$GLB,, | Performed by: PHYSICIAN ASSISTANT

## 2025-05-21 PROCEDURE — 1160F RVW MEDS BY RX/DR IN RCRD: CPT | Mod: CPTII,S$GLB,, | Performed by: PHYSICIAN ASSISTANT

## 2025-05-21 PROCEDURE — 3074F SYST BP LT 130 MM HG: CPT | Mod: CPTII,S$GLB,, | Performed by: PHYSICIAN ASSISTANT

## 2025-05-21 PROCEDURE — 99024 POSTOP FOLLOW-UP VISIT: CPT | Mod: S$GLB,,, | Performed by: PHYSICIAN ASSISTANT

## 2025-05-21 PROCEDURE — 99999 PR PBB SHADOW E&M-EST. PATIENT-LVL III: CPT | Mod: PBBFAC,,, | Performed by: PHYSICIAN ASSISTANT

## 2025-05-21 PROCEDURE — 3079F DIAST BP 80-89 MM HG: CPT | Mod: CPTII,S$GLB,, | Performed by: PHYSICIAN ASSISTANT

## 2025-05-21 NOTE — PROGRESS NOTES
"        Outpatient Rehab    Physical Therapy Visit    Patient Name: Devora Meeks  MRN: [id number]  YOB: 1995  Encounter Date: 5/19/2025    Therapy Diagnosis:   Encounter Diagnosis   Name Primary?    Weakness of right shoulder Yes     Physician: Kalpana Muñoz MD    Physician Orders: Eval and Treat  Medical Diagnosis: Vertigo    Visit # / Visits Authorized:  8 / 20  Insurance Authorization Period: 1/30/2025 to 12/31/2025  Date of Evaluation: 5/9/2025  Plan of Care Certification:       PT/PTA:     Number of PTA visits since last PT visit:   Time In: 1430   Time Out: 1500  Total Time (in minutes): 30   Total Billable Time (in minutes): 25    FOTO:  Intake Score:  %  Survey Score 2:  %  Survey Score 3:  %    Precautions:       Subjective   Pt reports moderate pain over the weekend. She has better control of it when doing her HEP..         Objective            Treatment:  Manual Therapy  MT 2: Rhythmic stabilization at 0 deg shoulder flexion for IR/ER, elbow flex/ext  MT 4: R shoulder PROM per protocol limitations 30 deg ER, 25 deg IR, abd to 45  Balance/Neuromuscular Re-Education  NMR 1: R elbow flexion/ext AAROM x10  NMR 3: R shoulder isometrics submaximal flex, ext, abd, add, ER 10x5"  Cold pack applied to right shoulder for 10 minutes    Patient performed exercise under the supervision of a physical therapy technician under the direction of supervising physical therapist.      Time Entry(in minutes):  Manual Therapy Time Entry: 15  Neuromuscular Re-Education Time Entry: 10    Assessment & Plan   Assessment: Pt presented reported minimal pain today. Motor control continues to improve. We will continue to progress per inferior labrum/capsulorrhaphy protocol.       Patient will continue to benefit from skilled outpatient physical therapy to address the deficits listed in the problem list box on initial evaluation, provide pt/family education and to maximize pt's level of independence in the " home and community environment.     Patient's spiritual, cultural, and educational needs considered and patient agreeable to plan of care and goals.           Plan: continue current POC    Goals:   Active       Functional outcome       Patient will show a significant change in FOTO patient-reported outcome tool to demonstrate subjective improvement       Start:  05/12/25    Expected End:  07/11/25            Patient will demonstrate independence in home program for support of progression       Start:  05/12/25    Expected End:  07/11/25               Leisure activities       Patient will participate in recreational activity without limitation.       Start:  05/12/25    Expected End:  07/11/25               Lifting & carrying objects       Patient will lift/carry up to 10 lbs to shoulder height without pain or limitation for return to work.       Start:  05/12/25    Expected End:  07/11/25               Pain       Patient will report pain of 3/10 demonstrating a reduction of overall pain       Start:  05/12/25    Expected End:  07/11/25               Range of Motion       Patient will improve right shoulder range of motion to WNL for improved right upper extremity function.       Start:  05/12/25    Expected End:  07/11/25               Strength       Patient will demonstrate improved right shoulder strength to 4/5 with MMT for improved right shoulder function.       Start:  05/12/25    Expected End:  07/11/25                Andrés Fernandez PT

## 2025-05-21 NOTE — PROGRESS NOTES
"      Outpatient Rehab    Physical Therapy Visit    Patient Name: Devora Meeks  MRN: [id number]  YOB: 1995  Encounter Date: 5/16/2025    Therapy Diagnosis:   Encounter Diagnosis   Name Primary?    Weakness of right shoulder Yes     Physician: Kalpana Muñoz MD    Physician Orders: Eval and Treat  Medical Diagnosis: Vertigo    Visit # / Visits Authorized:  8 / 20  Insurance Authorization Period: 1/30/2025 to 12/31/2025  Date of Evaluation: 5/9/2025  Plan of Care Certification:       PT/PTA:     Number of PTA visits since last PT visit:   Time In: 1530   Time Out: 1600  Total Time (in minutes): 30   Total Billable Time (in minutes): 25    FOTO:  Intake Score:  %  Survey Score 2:  %  Survey Score 3:  %    Precautions:       Subjective   Pt reports increased pain after taking the nerve block out, but overall its pretty well controlled..         Objective            Treatment:  Manual Therapy  MT 2: Rhythmic stabilization at 0 deg shoulder flexion for IR/ER  MT 4: R shoulder PROM per protocol limitations 30 deg ER, 25 deg IR, abd to 45  Balance/Neuromuscular Re-Education  NMR 3: R shoulder isometrics submaximal flex, ext, abd, add, ER 10x5"  NMR 4: Active elbow flexion/extension pronation/supination x20 each  Cold pack applied to right shoulder for 10 minutes    Time Entry(in minutes):  Manual Therapy Time Entry: 15  Neuromuscular Re-Education Time Entry: 10    Assessment & Plan   Assessment: Pt presented reported moderate pain today. PROM was well tolerated, and isometrics were performed at R shoulder. R elbow AROM was improved today with improved motor control at the R UE. We will continue to progress per inferior labrum/capsulorrhaphy protocol.       Patient will continue to benefit from skilled outpatient physical therapy to address the deficits listed in the problem list box on initial evaluation, provide pt/family education and to maximize pt's level of independence in the home and " community environment.     Patient's spiritual, cultural, and educational needs considered and patient agreeable to plan of care and goals.           Plan: continue current POC    Goals:   Active       Functional outcome       Patient will show a significant change in FOTO patient-reported outcome tool to demonstrate subjective improvement       Start:  05/12/25    Expected End:  07/11/25            Patient will demonstrate independence in home program for support of progression       Start:  05/12/25    Expected End:  07/11/25               Leisure activities       Patient will participate in recreational activity without limitation.       Start:  05/12/25    Expected End:  07/11/25               Lifting & carrying objects       Patient will lift/carry up to 10 lbs to shoulder height without pain or limitation for return to work.       Start:  05/12/25    Expected End:  07/11/25               Pain       Patient will report pain of 3/10 demonstrating a reduction of overall pain       Start:  05/12/25    Expected End:  07/11/25               Range of Motion       Patient will improve right shoulder range of motion to WNL for improved right upper extremity function.       Start:  05/12/25    Expected End:  07/11/25               Strength       Patient will demonstrate improved right shoulder strength to 4/5 with MMT for improved right shoulder function.       Start:  05/12/25    Expected End:  07/11/25                Andrés Fernandez, PT

## 2025-05-21 NOTE — PROGRESS NOTES
"CC: Right shoulder post op 2 weeks    Patient is here for her 2 week post op appointment s/p below and is doing well. Patient is doing PT at the [facility] location and is progressing as expected. Patient is only taking pain medication at night. she denies any chest pain, SOB, fevers, chills, nausea, vomiting, or drainage from incision sites.     DATE OF PROCEDURE: 5/7/2025      PREOPERATIVE DIAGNOSES:   1. Right shoulder instability.   2. Right shoulder capsular laxity     POSTOPERATIVE DIAGNOSES:   1. Right shoulder instability.   2. Right shoulder capsular laxity     PROCEDURES:   1. Right shoulder arthroscopic balanced plication / inferior capsullorrhaphy.     SURGEON: Femi Sotelo M.D.     Pain well tolerated on pain medication  Sling in place  No issues reported    Review of Systems   Constitution: Negative. Negative for chills, fever and night sweats.    Cardiovascular: Negative for chest pain and syncope.   Respiratory: Negative for cough and shortness of breath.    Gastrointestinal: Negative for abdominal pain and bowel incontinence.      PE:    /81   Pulse 89   Ht 5' 3" (1.6 m)   Wt 55.8 kg (123 lb 0.3 oz)   LMP 05/03/2025 (Exact Date)   BMI 21.79 kg/m²      Right shoulder    Incisions healed  No sign of infection  Swelling resolved  Compartments soft  Neurovascular status intact in extremity    PROM  Forward elevation 90 degrees  External rotation 20 degrees    Assessment:  2 weeks s/p right shoulder arthroscopic balance plication/inferior capsulorrhaphy    Plan:  1. Continue PT   2. Pain medication as needed for PT; try to wean off for next visit  3. Return to clinic in 4 weeks for 6 week post op follow up      All questions were answered. Instructed patient to call with questions or concerns in the interim.     "

## 2025-05-23 ENCOUNTER — CLINICAL SUPPORT (OUTPATIENT)
Dept: REHABILITATION | Facility: OTHER | Age: 30
End: 2025-05-23
Payer: COMMERCIAL

## 2025-05-23 DIAGNOSIS — R29.898 WEAKNESS OF RIGHT SHOULDER: Primary | ICD-10-CM

## 2025-05-23 PROCEDURE — 97112 NEUROMUSCULAR REEDUCATION: CPT | Mod: PN

## 2025-05-23 PROCEDURE — 97140 MANUAL THERAPY 1/> REGIONS: CPT | Mod: PN

## 2025-05-26 DIAGNOSIS — G89.18 ACUTE POSTOPERATIVE PAIN OF RIGHT SHOULDER: ICD-10-CM

## 2025-05-26 DIAGNOSIS — M25.311 MULTIDIRECTIONAL INSTABILITY OF RIGHT GLENOHUMERAL JOINT: ICD-10-CM

## 2025-05-26 DIAGNOSIS — M25.511 ACUTE POSTOPERATIVE PAIN OF RIGHT SHOULDER: ICD-10-CM

## 2025-05-26 DIAGNOSIS — Z98.890 S/P ARTHROSCOPY OF RIGHT SHOULDER: Primary | ICD-10-CM

## 2025-05-26 RX ORDER — OXYCODONE AND ACETAMINOPHEN 10; 325 MG/1; MG/1
1 TABLET ORAL EVERY 6 HOURS PRN
Qty: 28 TABLET | Refills: 0 | Status: CANCELLED | OUTPATIENT
Start: 2025-05-26

## 2025-05-27 ENCOUNTER — CLINICAL SUPPORT (OUTPATIENT)
Dept: REHABILITATION | Facility: OTHER | Age: 30
End: 2025-05-27
Payer: COMMERCIAL

## 2025-05-27 DIAGNOSIS — R29.898 WEAKNESS OF RIGHT SHOULDER: Primary | ICD-10-CM

## 2025-05-27 PROCEDURE — 97112 NEUROMUSCULAR REEDUCATION: CPT | Mod: PN

## 2025-05-27 PROCEDURE — 97140 MANUAL THERAPY 1/> REGIONS: CPT | Mod: PN

## 2025-05-27 NOTE — PROGRESS NOTES
"          Outpatient Rehab    Physical Therapy Visit    Patient Name: Devora Meeks  MRN: [id number]  YOB: 1995  Encounter Date: 5/23/2025    Therapy Diagnosis:   Encounter Diagnosis   Name Primary?    Weakness of right shoulder Yes     Physician: Kalpana Muñoz MD    Physician Orders: Eval and Treat  Medical Diagnosis: Vertigo    Visit # / Visits Authorized:  9 / 20  Insurance Authorization Period: 1/30/2025 to 12/31/2025  Date of Evaluation: 5/9/2025  Plan of Care Certification:       PT/PTA:     Number of PTA visits since last PT visit:   Time In: 1530   Time Out: 1606  Total Time (in minutes): 36   Total Billable Time (in minutes): 25    FOTO:  Intake Score:  %  Survey Score 2:  %  Survey Score 3:  %    Precautions:       Subjective   Pt reports increased pain after her first work shift..         Objective            Treatment:  Manual Therapy  MT 2: Rhythmic stabilization at 0 deg shoulder flexion for IR/ER, elbow flex/ext  MT 4: R shoulder PROM per protocol limitations 30 deg ER, 25 deg IR, abd to 45  Balance/Neuromuscular Re-Education  NMR 2: R elbow pronation/supination at 90 deg flexion  NMR 3: R shoulder isometrics submaximal flex, ext, abd, add, ER 10x5"  NMR 4: Active elbow flexion/extension pronation/supination x20 each  Cold pack applied to right shoulder for 10 minutes    Patient performed exercise under the supervision of a physical therapy technician under the direction of supervising physical therapist.      Time Entry(in minutes):  Manual Therapy Time Entry: 15  Neuromuscular Re-Education Time Entry: 10    Assessment & Plan   Assessment: Pt presented reported moderate pain today and presented with some R shoulder irritation. R shoulder PROM was limited today which may have been 2nd to muscle guarding. Motor control continues to improve with rhythmic stab and with wirst/elbow. We will continue to progress per inferior labrum/capsulorrhaphy protocol.       Patient will " continue to benefit from skilled outpatient physical therapy to address the deficits listed in the problem list box on initial evaluation, provide pt/family education and to maximize pt's level of independence in the home and community environment.     Patient's spiritual, cultural, and educational needs considered and patient agreeable to plan of care and goals.           Plan: continue current POC    Goals:   Active       Functional outcome       Patient will show a significant change in FOTO patient-reported outcome tool to demonstrate subjective improvement       Start:  05/12/25    Expected End:  07/11/25            Patient will demonstrate independence in home program for support of progression       Start:  05/12/25    Expected End:  07/11/25               Leisure activities       Patient will participate in recreational activity without limitation.       Start:  05/12/25    Expected End:  07/11/25               Lifting & carrying objects       Patient will lift/carry up to 10 lbs to shoulder height without pain or limitation for return to work.       Start:  05/12/25    Expected End:  07/11/25               Pain       Patient will report pain of 3/10 demonstrating a reduction of overall pain       Start:  05/12/25    Expected End:  07/11/25               Range of Motion       Patient will improve right shoulder range of motion to WNL for improved right upper extremity function.       Start:  05/12/25    Expected End:  07/11/25               Strength       Patient will demonstrate improved right shoulder strength to 4/5 with MMT for improved right shoulder function.       Start:  05/12/25    Expected End:  07/11/25                Andrés Fernandez, PT

## 2025-05-28 RX ORDER — OXYCODONE AND ACETAMINOPHEN 5; 325 MG/1; MG/1
1 TABLET ORAL EVERY 8 HOURS PRN
Qty: 20 TABLET | Refills: 0 | Status: SHIPPED | OUTPATIENT
Start: 2025-05-28

## 2025-05-29 NOTE — PROGRESS NOTES
"  Outpatient Rehab    Physical Therapy Visit    Patient Name: Devora Meeks  MRN: [id number]  YOB: 1995  Encounter Date: 5/27/2025    Therapy Diagnosis:   Encounter Diagnosis   Name Primary?    Weakness of right shoulder Yes     Physician: Kalpana Muñoz MD    Physician Orders: Eval and Treat  Medical Diagnosis: Vertigo    Visit # / Visits Authorized:  10 / 20  Insurance Authorization Period: 1/30/2025 to 12/31/2025  Date of Evaluation: 5/9/2025  Plan of Care Certification:       PT/PTA:     Number of PTA visits since last PT visit:   Time In: 1430   Time Out: 1500  Total Time (in minutes): 30   Total Billable Time (in minutes): 25    FOTO:  Intake Score:  %  Survey Score 2:  %  Survey Score 3:  %    Precautions:       Subjective   Pt reports increased pain after her first work shift..         Objective            Treatment:  Manual Therapy  MT 2: Rhythmic stabilization at 0 deg shoulder flexion for IR/ER, elbow flex/ext  MT 4: R shoulder PROM per protocol limitations 30 deg ER, 25 deg IR, abd to 45  Balance/Neuromuscular Re-Education  NMR 1: R shoulder flexion alphabet 0# at 90 deg flexion 1 lap  NMR 2: R elbow ER walk outs vs YTB x10  NMR 3: R shoulder isometrics submaximal flex, ext, abd, add, ER 10x5"  NMR 4: Active elbow flexion/extension pronation/supination x20 each  Cold pack applied to right shoulder for 10 minutes    Patient performed exercise under the supervision of a physical therapy technician under the direction of supervising physical therapist.      Time Entry(in minutes):  Manual Therapy Time Entry: 15  Neuromuscular Re-Education Time Entry: 10    Assessment & Plan   Assessment: Pt presented reported moderate pain today. She stated that she has been working which takes a toll on her shoulder. She continues to be compliant with HEP and ROM restrictions as well as sling use. We will continue to progress per protocol       Patient will continue to benefit from skilled " outpatient physical therapy to address the deficits listed in the problem list box on initial evaluation, provide pt/family education and to maximize pt's level of independence in the home and community environment.     Patient's spiritual, cultural, and educational needs considered and patient agreeable to plan of care and goals.           Plan: continue current POC    Goals:   Active       Functional outcome       Patient will show a significant change in FOTO patient-reported outcome tool to demonstrate subjective improvement       Start:  05/12/25    Expected End:  07/11/25            Patient will demonstrate independence in home program for support of progression       Start:  05/12/25    Expected End:  07/11/25               Leisure activities       Patient will participate in recreational activity without limitation.       Start:  05/12/25    Expected End:  07/11/25               Lifting & carrying objects       Patient will lift/carry up to 10 lbs to shoulder height without pain or limitation for return to work.       Start:  05/12/25    Expected End:  07/11/25               Pain       Patient will report pain of 3/10 demonstrating a reduction of overall pain       Start:  05/12/25    Expected End:  07/11/25               Range of Motion       Patient will improve right shoulder range of motion to WNL for improved right upper extremity function.       Start:  05/12/25    Expected End:  07/11/25               Strength       Patient will demonstrate improved right shoulder strength to 4/5 with MMT for improved right shoulder function.       Start:  05/12/25    Expected End:  07/11/25                Andrés Fernandez, PT

## 2025-05-30 ENCOUNTER — CLINICAL SUPPORT (OUTPATIENT)
Dept: REHABILITATION | Facility: OTHER | Age: 30
End: 2025-05-30
Payer: COMMERCIAL

## 2025-05-30 DIAGNOSIS — R29.898 WEAKNESS OF RIGHT SHOULDER: Primary | ICD-10-CM

## 2025-05-30 PROCEDURE — 97112 NEUROMUSCULAR REEDUCATION: CPT | Mod: PN

## 2025-05-30 PROCEDURE — 97140 MANUAL THERAPY 1/> REGIONS: CPT | Mod: PN

## 2025-06-03 ENCOUNTER — CLINICAL SUPPORT (OUTPATIENT)
Dept: REHABILITATION | Facility: OTHER | Age: 30
End: 2025-06-03
Payer: COMMERCIAL

## 2025-06-03 DIAGNOSIS — R29.898 WEAKNESS OF RIGHT SHOULDER: Primary | ICD-10-CM

## 2025-06-03 PROCEDURE — 97112 NEUROMUSCULAR REEDUCATION: CPT | Mod: PN

## 2025-06-03 PROCEDURE — 97140 MANUAL THERAPY 1/> REGIONS: CPT | Mod: PN

## 2025-06-06 ENCOUNTER — CLINICAL SUPPORT (OUTPATIENT)
Dept: REHABILITATION | Facility: OTHER | Age: 30
End: 2025-06-06
Payer: COMMERCIAL

## 2025-06-06 DIAGNOSIS — R29.898 WEAKNESS OF RIGHT SHOULDER: Primary | ICD-10-CM

## 2025-06-06 PROCEDURE — 97112 NEUROMUSCULAR REEDUCATION: CPT | Mod: PN

## 2025-06-07 ENCOUNTER — OFFICE VISIT (OUTPATIENT)
Dept: URGENT CARE | Facility: CLINIC | Age: 30
End: 2025-06-07
Payer: COMMERCIAL

## 2025-06-07 VITALS
RESPIRATION RATE: 18 BRPM | WEIGHT: 120 LBS | HEART RATE: 106 BPM | HEIGHT: 63 IN | OXYGEN SATURATION: 98 % | DIASTOLIC BLOOD PRESSURE: 99 MMHG | BODY MASS INDEX: 21.26 KG/M2 | TEMPERATURE: 98 F | SYSTOLIC BLOOD PRESSURE: 138 MMHG

## 2025-06-07 DIAGNOSIS — Z98.890 STATUS POST SHOULDER SURGERY: ICD-10-CM

## 2025-06-07 DIAGNOSIS — M25.511 ACUTE PAIN OF RIGHT SHOULDER: Primary | ICD-10-CM

## 2025-06-07 PROCEDURE — 99214 OFFICE O/P EST MOD 30 MIN: CPT | Mod: S$GLB,,, | Performed by: PHYSICIAN ASSISTANT

## 2025-06-07 RX ORDER — ACETAMINOPHEN 500 MG
500 TABLET ORAL EVERY 6 HOURS PRN
Qty: 30 TABLET | Refills: 0 | Status: SHIPPED | OUTPATIENT
Start: 2025-06-07

## 2025-06-07 RX ORDER — HYDROCODONE BITARTRATE AND ACETAMINOPHEN 5; 325 MG/1; MG/1
1 TABLET ORAL EVERY 12 HOURS PRN
Qty: 6 TABLET | Refills: 0 | Status: SHIPPED | OUTPATIENT
Start: 2025-06-07 | End: 2025-06-10

## 2025-06-07 RX ORDER — IBUPROFEN 600 MG/1
600 TABLET, FILM COATED ORAL EVERY 8 HOURS PRN
Qty: 30 TABLET | Refills: 0 | Status: SHIPPED | OUTPATIENT
Start: 2025-06-08

## 2025-06-07 RX ORDER — METHOCARBAMOL 750 MG/1
750 TABLET, FILM COATED ORAL 3 TIMES DAILY PRN
Qty: 30 TABLET | Refills: 0 | Status: SHIPPED | OUTPATIENT
Start: 2025-06-07

## 2025-06-07 RX ORDER — PREGABALIN 25 MG/1
25 CAPSULE ORAL 2 TIMES DAILY
Qty: 60 CAPSULE | Refills: 0 | Status: SHIPPED | OUTPATIENT
Start: 2025-06-07 | End: 2025-07-07

## 2025-06-07 NOTE — PATIENT INSTRUCTIONS
PLEASE READ YOUR DISCHARGE INSTRUCTIONS ENTIRELY AS IT CONTAINS IMPORTANT INFORMATION.  - MULTIMODAL PAIN CONTROL:  Acetaminophen (tylenol 500mg every 6-8 hour as needed with food). Maximum of 3000mg total in 24 hour period.  Ibuprofen (advil,motrin 600mg every 8 hour with food as needed) as directed as needed for fever/pain.   Avoid tylenol if you have a history of liver disease or allergic reactions. Do not take ibuprofen if you have a history of allergic reactions, stomach bleeding ulcers, kidney disease, or if you take blood thinners.  -The patient was advised that NSAID-type medications have two very important potential side effects: gastrointestinal irritation including hemorrhage and renal injuries. patient was asked to take the medication with food and to stop if patient experiences any GI upset. I asked patient to call for vomiting, abdominal pain or black/bloody stools. The patient expresses understanding of these issues and all questions were answered.  MUSCLE RELAXANTS/ROBAXIN/METHOCARBAMOL to help with muscle spasms.  no operating machinery with muscle relaxant as it may cause drowsiness.  Discontinue or lower dose to half tablet if you have significant drowsiness or lethargy.  Start pregabalin 25 mg every 12 hours and may increase up to 50 mg every 12 hours to help with nerve pain.  Discontinue if any significant drowsiness, headache, leg swelling, or side effects.  Take hydrocodone 5 mg as needed for severe pain not controlled by combination multimodal regimen with pregabalin, methocarbamol, acetaminophen, and ibuprofen.  No operating machinery with pain medication.  Do not combine with other sedated food or medication.  - can continue  prescribed brace, splint or wrap as needed to help with your symptoms.  - continue heat (muscle) /ice (bone/joint) compression, rice therapy, and muscle stretches.   - Radiographs and all diagnostic testing reviewed with patient  - if no improvement or worsening  symptoms, recommend follow-up with *orthopedics or PCP for further evaluation.  Please call the number below to set up appointment; if a referral has been placed.  - Follow up with your PCP or specialty clinic as directed.  You can call [phone] or [phone] to schedule an appointment with the appropriate provider.      -You must understand that you've received an Urgent Care treatment only and that you may be released before all your medical problems are known or treated. You, the patient, will arrange for follow up care as instructed. Please arrange follow up with your primary medical clinic within 2-5 days if your signs and symptoms have not resolved or worsen.   - If your condition worsens or fails to improve we recommend that you receive another evaluation at the emergency room immediately or contact your primary medical clinic to discuss your concerns in next 2-5 days.  Strict ER versus clinic precautions given.      RED FLAGS/WARNING SYMPTOMS DISCUSSED WITH PATIENT THAT WOULD WARRANT EMERGENT MEDICAL ATTENTION. Patient aware and verbalized understanding.      RICE     Rest an injury, elevate it, and use ice and compression as directed.   RICE stands for rest, ice, compression, and elevation. These can limit pain and swelling after an injury. RICE may be recommended to help treat fractures, sprains, strains, and bruises or bumps.   Home care  The following explain the details of RICE:  Rest. Limit the use of the injured body part. This helps prevent further damage to the body part and gives it time to heal. In some cases, you may need a sling, brace, splint, or cast to help keep the body part still until it has healed.  Ice. Applying ice right after an injury helps relieve pain and swelling. Wrap a bag of ice in a thin towel. Then, place it over the injured area. Do this for 10 to 15 minutes every 3 to 4 hours. Continue for the next 1 to 3 days or until your symptoms improve. Never put ice directly on  your skin or ice an area longer than 15 minutes at a time.  Compression. Putting pressure on an injury helps reduce swelling and provides support. Wrap the injured area firmly with an elastic bandage/wrap. Make sure not to wrap the bandage too tightly or you will cut off blood flow to the injured area. If your bandage loosens, rewrap it.  Elevation. Keeping an injury raised above the level of your heart reduces swelling, pain, and throbbing. For instance, if you have a broken leg, it may help to rest your leg on several pillows when sitting or lying down. Try to keep the injured area elevated for at least 2 to 3 hours per day.  Follow-up care  Follow up with your healthcare provider, or as advised.  When to seek medical advice  Call your healthcare provider right away if any of these occur:  Fever of 100.4°F (38°C) or higher, or as directed by your healthcare provider  Increased pain or swelling in the injured body part  Injured body part becomes cold, blue, numb, or tingly  Signs of infection. These include warmth in the skin, redness, drainage, or bad smell coming from the injured body part.  Date Last Reviewed: 1/18/2016  © 9576-7271 [employer]. [address] [place] [address], [place] [address]. All rights reserved. This information is not intended as a substitute for professional medical care. Always follow your healthcare professional's instructions.

## 2025-06-07 NOTE — PROGRESS NOTES
"Subjective:      Patient ID: Devora Meeks is a 30 y.o. female.    Vitals:  height is 5' 3" (1.6 m) and weight is 54.4 kg (120 lb). Her oral temperature is 98.1 °F (36.7 °C). Her blood pressure is 138/99 (abnormal) and her pulse is 106. Her respiration is 18 and oxygen saturation is 98%.     Chief Complaint: Shoulder Pain    30-year-old female with a history of migraines, ADHD, chronic shoulder pain due to instability who is now status postright shoulder arthroscopic balance plication/inferior capsulorrhaphy on 05/07/2025 by Dr. Femi Sotelo who presents to urgent care clinic for evaluation.  Reports she has right shoulder pain rated as 5/10 overall healing since her surgery and undergoes outpatient physical therapy twice a day week.  She was told to wear right shoulder stabilized at all times.  States pain is keeping her up at night so she requested opioid refill from her provider yesterday but has not received response.  She is out of her Percocet 10 mg.  She reports new injury occurred today on right shoulder and now has exacerbation of right shoulder pain.  Reported industrial kitchen door hit her in the right shoulder at 1pm while she is wearing her shoulder stabilizer.  Cause her to pull her right shoulder inward.  Now pain is aching throbbing, sharp, and shooting rated as 8/10.  She tried over-the-counter Tylenol and ibuprofen 400 mg with no significant relief.  Not taking anything else for her pain.  She is primarily requesting pain control today.  No open wound, right upper extremity weakness, loss of sensation, or gross abnormality. .    Shoulder Pain   The pain is present in the right shoulder. This is a recurrent problem. The current episode started today. The problem occurs constantly. The problem has been gradually worsening. The pain is at a severity of 8/10. The pain is severe. Associated symptoms include a limited range of motion. Pertinent negatives include no fever, headaches, " inability to bear weight, itching, joint locking, joint swelling, numbness, stiffness, tingling or visual symptoms. She has tried NSAIDS and acetaminophen for the symptoms. The treatment provided mild relief. Family history includes arthritis. Her past medical history is significant for Injuries to Extremity. There is no history of diabetes or migraines.       Constitution: Negative for activity change, appetite change, chills, sweating, fatigue, fever, unexpected weight change and generalized weakness.   Musculoskeletal:  Positive for trauma, joint pain, joint swelling, abnormal ROM of joint, arthritis and muscle ache. Negative for back pain.   Skin:  Negative for pale, rash, wound, abrasion and erythema.   Neurological:  Negative for headaches and numbness.      Objective:     Physical Exam   Constitutional: She appears well-developed. She is cooperative.  Non-toxic appearance. She does not appear ill. No distress.      Comments:Emotional, labile, and crying during physical exam     HENT:   Head: Normocephalic and atraumatic.   Ears:   Right Ear: Hearing and external ear normal.   Left Ear: Hearing and external ear normal.   Nose: Nose normal.   Mouth/Throat: Oropharynx is clear and moist. Mucous membranes are moist. Oropharynx is clear.   Eyes: Conjunctivae, EOM and lids are normal. Right eye exhibits no discharge. Left eye exhibits no discharge. vision grossly intact gaze aligned appropriately   Neck: Neck supple. No neck rigidity present.   Cardiovascular: Normal rate, regular rhythm and normal pulses.   Pulmonary/Chest: Effort normal. No accessory muscle usage. No respiratory distress. She has no wheezes. She exhibits no tenderness.   Abdominal: Normal appearance. She exhibits no distension.   Musculoskeletal:         General: Tenderness present. No swelling.      Right lower leg: No edema.      Left lower leg: No edema.      Comments: Moves all extremities with normal tone, strength, and ROM.    In right  shoulder immobilizer.  Right shoulder incisional arthroscopic incisions well healed with no redness, induration, fluctuance, or drainage.     Full mobility and strength of right hand fingers/wrist.  Brisk capillary refill.   Neurological: no focal deficit. She is alert and at baseline. She has normal motor skills and normal sensation. She displays no weakness, facial symmetry and normal reflexes. No sensory deficit. She exhibits normal muscle tone. Gait and coordination normal. Coordination normal.   Skin: Skin is warm, dry, not diaphoretic and no rash. Capillary refill takes less than 2 seconds. No erythema   Psychiatric: Her behavior is normal. Mood and thought content normal.   Nursing note and vitals reviewed.      Assessment:     1. Acute pain of right shoulder    2. Status post shoulder surgery    Note dictated with voice recognition software, please excuse any grammatical errors.    Patient presents with clinical exam findings and history consistent with above.  We discussed the differential diagnosis.    On exam, patient is nontoxic appearing and vitals are stable.  Patient is essentially neurovascularly intact on exam.  s/p right shoulder arthroscopic balance plication/inferior capsulorrhaphy on 05/07/2025 by Dr. Femi Sotelo.  With acute on chronic injury today with pain exacerbation from baseline with no new deficits.    Diagnostic testing results were independently reviewed and interpreted, which were discussed in depth with patient.   Test ordered in clinic:  None  Additionally, previous progress notes/admissions/lab were reviewed and interpreted.  CMP 12/07/2024 and 05/18/2024 with good kidney function and EGFR.  Outpatient physical therapy progress note 05/27/2025, 05/30/2025, and 06/03/2025 reviewed   Sports medicine progress note 05/21/2025 reviewed.  Recommendation for continuing PT and pain medication while Pt with goal to wean off opioids.  LA  reviewed.    We had shared medical decision  making for patient's treatment and care.  Patient has acute exacerbation of chronic illness /acute injury. We did discuss the risk of morbidity without treatment is significant.     Patient has chronic medical conditions that affected my medical decision making and treatment plan for today's visit.  It also impacted the medications that were able to be prescribed for the visit today.      Plan:     We discussed appropriate multimodal pain control as below.  The goal is to wean all opioids from acute postop standpoint.  She has 4 weeks status post arthroscopy surgery of right shoulder.  Undergoing outpatient PT twice a day week.  She is agreeable to my recommendations below in the interim during the weekend since her orthopedic surgeon/team it is not available during the weekend.  She will contact them on Monday/regular hours to discuss her postoperative pain control.  She needed a work excuse note for today as well.    - MULTIMODAL PAIN CONTROL:  Acetaminophen (tylenol 500mg every 6-8 hour as needed with food). Maximum of 3000mg total in 24 hour period.  Ibuprofen (advil,motrin 600mg every 8 hour with food as needed) as directed as needed for fever/pain.  Maximum of 2400 mg per 24 hours.  MUSCLE RELAXANTS/ROBAXIN/METHOCARBAMOL 750 every 8 hours (up to 1000 mg Q 8-6 hours prn) to help with muscle spasms. Discontinue or lower dose to half tablet if you have significant drowsiness or lethargy.  Start pregabalin 25 mg every 12 hours and may increase up to 50 mg every 12 hours to help with nerve pain.  Discontinue or maintain lower dose if any significant drowsiness, headache, leg swelling, or side effects.  Reported previously trialing gabapentin but discontinue due to increased headaches (unable to tell me her previous dosing but maybe 3 tablets twice a day of gabapentin 300 mg).  We discussed restarting gabapentin at a lower dose only in the evening time since her pain is keeping her up at night but she declined and  would like to trial pregabalin instead.  This is reasonable.  Take hydrocodone 5 mg q.12h hours as needed for severe pain not controlled by combination multimodal regimen with pregabalin, methocarbamol, acetaminophen, and ibuprofen.  She was prescribed a total of 6 tablets today and we will not be given any additional opioids.  We discussed opioid is use as breakthrough pain not as a scheduled medication.  We discussed side effects of opioid use alone and in conjunction with other medication.  She has not been taking her methylphenidate since surgery.      Acute pain of right shoulder  -     pregabalin (LYRICA) 25 MG capsule; Take 1 capsule (25 mg total) by mouth 2 (two) times daily. May increase to 50mg twice a day as tolerated for multimodal pain control after right shoulder surgery 5/7/25  Dispense: 60 capsule; Refill: 0  -     methocarbamoL (ROBAXIN) 750 MG Tab; Take 1 tablet (750 mg total) by mouth 3 (three) times daily as needed (muscle spasms). May increase to 1000mg every 8hours PRN as tolerated for multimodal pain control after right shoulder surgery 5/7/25  Dispense: 30 tablet; Refill: 0  -     ibuprofen (ADVIL,MOTRIN) 600 MG tablet; Take 1 tablet (600 mg total) by mouth every 8 (eight) hours as needed for Pain (take with food for pain). For multimodal pain control after right shoulder surgery 5/7/25  Dispense: 30 tablet; Refill: 0  -     HYDROcodone-acetaminophen (NORCO) 5-325 mg per tablet; Take 1 tablet by mouth every 12 (twelve) hours as needed for Pain (BREAKTHROUGH PAIN NOT CONTROLLED BY MULTIMODAL REGIMEN).  Dispense: 6 tablet; Refill: 0  -     acetaminophen (TYLENOL) 500 MG tablet; Take 1 tablet (500 mg total) by mouth every 6 (six) hours as needed for Pain (multimodal pain control after right shoulder surgery 5/7/25).  Dispense: 30 tablet; Refill: 0    Status post shoulder surgery  -     pregabalin (LYRICA) 25 MG capsule; Take 1 capsule (25 mg total) by mouth 2 (two) times daily. May increase to  50mg twice a day as tolerated for multimodal pain control after right shoulder surgery 5/7/25  Dispense: 60 capsule; Refill: 0  -     methocarbamoL (ROBAXIN) 750 MG Tab; Take 1 tablet (750 mg total) by mouth 3 (three) times daily as needed (muscle spasms). May increase to 1000mg every 8hours PRN as tolerated for multimodal pain control after right shoulder surgery 5/7/25  Dispense: 30 tablet; Refill: 0  -     ibuprofen (ADVIL,MOTRIN) 600 MG tablet; Take 1 tablet (600 mg total) by mouth every 8 (eight) hours as needed for Pain (take with food for pain). For multimodal pain control after right shoulder surgery 5/7/25  Dispense: 30 tablet; Refill: 0  -     HYDROcodone-acetaminophen (NORCO) 5-325 mg per tablet; Take 1 tablet by mouth every 12 (twelve) hours as needed for Pain (BREAKTHROUGH PAIN NOT CONTROLLED BY MULTIMODAL REGIMEN).  Dispense: 6 tablet; Refill: 0  -     acetaminophen (TYLENOL) 500 MG tablet; Take 1 tablet (500 mg total) by mouth every 6 (six) hours as needed for Pain (multimodal pain control after right shoulder surgery 5/7/25).  Dispense: 30 tablet; Refill: 0

## 2025-06-07 NOTE — LETTER
June 7, 2025      [facility] [place]  [facility] [place] [facility] [address]  Phone: [phone]  Fax: [phone]       Patient: Devora Meeks   YOB: 1995  Date of Visit: 06/07/2025    To Whom It May Concern:    Vini Meeks  was at [facility] on 06/07/2025. The patient may return to work/school on 6/08/2025 with no restrictions. If you have any questions or concerns, or if I can be of further assistance, please do not hesitate to contact me.    Sincerely,    Kylie Roca MA

## 2025-06-10 ENCOUNTER — CLINICAL SUPPORT (OUTPATIENT)
Dept: REHABILITATION | Facility: OTHER | Age: 30
End: 2025-06-10
Payer: COMMERCIAL

## 2025-06-10 DIAGNOSIS — R29.898 WEAKNESS OF RIGHT SHOULDER: Primary | ICD-10-CM

## 2025-06-10 PROCEDURE — 97140 MANUAL THERAPY 1/> REGIONS: CPT | Mod: PN

## 2025-06-10 PROCEDURE — 97112 NEUROMUSCULAR REEDUCATION: CPT | Mod: PN

## 2025-06-10 NOTE — PROGRESS NOTES
"  Outpatient Rehab    Physical Therapy Visit    Patient Name: Devora Meeks  MRN: [id number]  YOB: 1995  Encounter Date: 6/6/2025    Therapy Diagnosis:   Encounter Diagnosis   Name Primary?    Weakness of right shoulder Yes     Physician: Kalpana Muñoz MD    Physician Orders: Eval and Treat  Medical Diagnosis: Vertigo    Visit # / Visits Authorized:  14 / 20  Insurance Authorization Period: 1/30/2025 to 12/31/2025  Date of Evaluation: 5/9/2025  Plan of Care Certification:       PT/PTA:     Number of PTA visits since last PT visit:   Time In: 1530   Time Out: 1600  Total Time (in minutes): 30   Total Billable Time (in minutes): 25    FOTO:  Intake Score:  %  Survey Score 2:  %  Survey Score 3:  %    Precautions:       Subjective   Her pain continues to be increased. She has had some challenging days at work..         Objective            Treatment:  Manual Therapy  MT 2: Rhythmic stabilization at 0 deg shoulder flexion for IR/ER, elbow flex/ext  MT 4: R shoulder PROM per protocol limitations 30 deg ER, 25 deg IR, abd to 45  Balance/Neuromuscular Re-Education  NMR 1: R shoulder flexion alphabet 0# at 90 deg flexion 1 lap  NMR 2: R elbow ER walk outs vs YTB x10  NMR 3: R shoulder isometrics submaximal flex, ext, abd, add, ER 10x5"  NMR 4: Active elbow flexion/extension pronation/supination x20 each  NMR 5: Supine shoulder AAROM shoulder press x10  NMR 6: Supine shoulder AAROM shoulder flexion x10  NMR 7: Pulleys flexion/scaption 0-90 x 2 mins each  Cold pack applied to right shoulder for 10 minutes    Patient performed exercise under the supervision of a physical therapy technician under the direction of supervising physical therapist.      Time Entry(in minutes):  Manual Therapy Time Entry: 5  Neuromuscular Re-Education Time Entry: 25    Assessment & Plan   Assessment: Pt presented with persisting increased tissue irritation. We did add some R shoulder AAROM and more motor control movements. " We will continue to progress per protocol       Patient will continue to benefit from skilled outpatient physical therapy to address the deficits listed in the problem list box on initial evaluation, provide pt/family education and to maximize pt's level of independence in the home and community environment.     Patient's spiritual, cultural, and educational needs considered and patient agreeable to plan of care and goals.           Plan: continue current POC    Goals:   Active       Functional outcome       Patient will show a significant change in FOTO patient-reported outcome tool to demonstrate subjective improvement       Start:  05/12/25    Expected End:  07/11/25            Patient will demonstrate independence in home program for support of progression       Start:  05/12/25    Expected End:  07/11/25               Leisure activities       Patient will participate in recreational activity without limitation.       Start:  05/12/25    Expected End:  07/11/25               Lifting & carrying objects       Patient will lift/carry up to 10 lbs to shoulder height without pain or limitation for return to work.       Start:  05/12/25    Expected End:  07/11/25               Pain       Patient will report pain of 3/10 demonstrating a reduction of overall pain       Start:  05/12/25    Expected End:  07/11/25               Range of Motion       Patient will improve right shoulder range of motion to WNL for improved right upper extremity function.       Start:  05/12/25    Expected End:  07/11/25               Strength       Patient will demonstrate improved right shoulder strength to 4/5 with MMT for improved right shoulder function.       Start:  05/12/25    Expected End:  07/11/25                Andrés Fernandez, PT

## 2025-06-12 NOTE — PROGRESS NOTES
"    Outpatient Rehab    Physical Therapy Visit    Patient Name: Devora Meeks  MRN: [id number]  YOB: 1995  Encounter Date: 6/10/2025    Therapy Diagnosis:   Encounter Diagnosis   Name Primary?    Weakness of right shoulder Yes       Physician: Kalpana Muñoz MD    Physician Orders: Eval and Treat  Medical Diagnosis: Vertigo    Visit # / Visits Authorized:  14 / 20  Insurance Authorization Period: 1/30/2025 to 12/31/2025  Date of Evaluation: 5/9/2025  Plan of Care Certification:       PT/PTA:     Number of PTA visits since last PT visit:   Time In: 0930   Time Out: 1030  Total Time (in minutes): 60   Total Billable Time (in minutes): 40    FOTO:  Intake Score:  %  Survey Score 2:  %  Survey Score 3:  %    Precautions:       Subjective   Her pain has continued to be elevated. She had multiple incidents at work where someone ran into her shoulder..         Objective            Treatment:  Manual Therapy  MT 2: Rhythmic stabilization 4 way at 0/90 deg shoulder flexion  MT 4: R shoulder PROM per protocol limitations  Balance/Neuromuscular Re-Education  NMR 1: R shoulder flexion alphabet 1# at 90 deg flexion 1 lap  NMR 3: R shoulder isometrics submaximal flex, ext, abd, add, ER 10x5"  NMR 5: Supine shoulder AAROM shoulder press x10  NMR 6: Supine shoulder AAROM shoulder flexion x10  NMR 7: Pulleys flexion/scaption 0-90 x 2 mins each  NMR 8: SL shoulder ER/ABD AAROM with therapist assist x10 each    Cold pack applied to right shoulder for 10 minutes    Patient performed exercise under the supervision of a physical therapy technician under the direction of supervising physical therapist.      Time Entry(in minutes):  Manual Therapy Time Entry: 10  Neuromuscular Re-Education Time Entry: 30    Assessment & Plan   Assessment: Pt presented with persisting increased tissue irritation. We continue to add R shoulder AAROM and more motor control movements. We will continue to progress per protocol   "     Patient will continue to benefit from skilled outpatient physical therapy to address the deficits listed in the problem list box on initial evaluation, provide pt/family education and to maximize pt's level of independence in the home and community environment.     Patient's spiritual, cultural, and educational needs considered and patient agreeable to plan of care and goals.           Plan: continue current POC    Goals:   Active       Functional outcome       Patient will show a significant change in FOTO patient-reported outcome tool to demonstrate subjective improvement       Start:  05/12/25    Expected End:  07/11/25            Patient will demonstrate independence in home program for support of progression       Start:  05/12/25    Expected End:  07/11/25               Leisure activities       Patient will participate in recreational activity without limitation.       Start:  05/12/25    Expected End:  07/11/25               Lifting & carrying objects       Patient will lift/carry up to 10 lbs to shoulder height without pain or limitation for return to work.       Start:  05/12/25    Expected End:  07/11/25               Pain       Patient will report pain of 3/10 demonstrating a reduction of overall pain       Start:  05/12/25    Expected End:  07/11/25               Range of Motion       Patient will improve right shoulder range of motion to WNL for improved right upper extremity function.       Start:  05/12/25    Expected End:  07/11/25               Strength       Patient will demonstrate improved right shoulder strength to 4/5 with MMT for improved right shoulder function.       Start:  05/12/25    Expected End:  07/11/25                  Andrés Fernandez, PT

## 2025-06-17 ENCOUNTER — CLINICAL SUPPORT (OUTPATIENT)
Dept: REHABILITATION | Facility: OTHER | Age: 30
End: 2025-06-17
Payer: COMMERCIAL

## 2025-06-17 ENCOUNTER — OFFICE VISIT (OUTPATIENT)
Dept: SPORTS MEDICINE | Facility: CLINIC | Age: 30
End: 2025-06-17
Payer: COMMERCIAL

## 2025-06-17 DIAGNOSIS — R29.898 WEAKNESS OF RIGHT SHOULDER: Primary | ICD-10-CM

## 2025-06-17 DIAGNOSIS — Z98.890 S/P ARTHROSCOPY OF RIGHT SHOULDER: Primary | ICD-10-CM

## 2025-06-17 PROCEDURE — 97140 MANUAL THERAPY 1/> REGIONS: CPT | Mod: PN

## 2025-06-17 PROCEDURE — 97112 NEUROMUSCULAR REEDUCATION: CPT | Mod: PN

## 2025-06-17 PROCEDURE — 99024 POSTOP FOLLOW-UP VISIT: CPT | Mod: S$GLB,,, | Performed by: ORTHOPAEDIC SURGERY

## 2025-06-17 PROCEDURE — 99999 PR PBB SHADOW E&M-EST. PATIENT-LVL I: CPT | Mod: PBBFAC,,, | Performed by: ORTHOPAEDIC SURGERY

## 2025-06-17 PROCEDURE — 97164 PT RE-EVAL EST PLAN CARE: CPT | Mod: PN

## 2025-06-17 NOTE — PROGRESS NOTES
CC: Right shoulder post op 6 weeks    Patient is here for her 6 week post op appointment s/p below and is doing well. Patient is doing PT at the [facility] location and is progressing as expected. She went to the urgent care recently because of contact with a person from work. No dislocation or issue but patient reports pain following this.     DATE OF PROCEDURE: 5/7/2025      PREOPERATIVE DIAGNOSES:   1. Right shoulder instability.   2. Right shoulder capsular laxity     POSTOPERATIVE DIAGNOSES:   1. Right shoulder instability.   2. Right shoulder capsular laxity     PROCEDURES:   1. Right shoulder arthroscopic balanced plication / inferior capsullorrhaphy.     SURGEON: Femi Sotelo M.D.     Pain well tolerated on pain medication  Sling in place  No issues reported    Review of Systems   Constitution: Negative. Negative for chills, fever and night sweats.    Cardiovascular: Negative for chest pain and syncope.   Respiratory: Negative for cough and shortness of breath.    Gastrointestinal: Negative for abdominal pain and bowel incontinence.      PE:    LMP 05/04/2025      Right shoulder    Incisions healed  No sign of infection  Swelling resolved  Compartments soft  Neurovascular status intact in extremity    PROM  Forward elevation 90 degrees  External rotation 20 degrees    Assessment:  6 weeks s/p right shoulder arthroscopic balance plication/inferior capsulorrhaphy    Plan:  1. Continue PT   2. Discontinue sling  3. F/u in 6 weeks.       All questions were answered. Instructed patient to call with questions or concerns in the interim.

## 2025-06-17 NOTE — PROGRESS NOTES
Outpatient Rehab    Physical Therapy Progress Note    Patient Name: Devora Meeks  MRN: [id number]  YOB: 1995  Encounter Date: 6/17/2025    Therapy Diagnosis:   Encounter Diagnosis   Name Primary?    Weakness of right shoulder Yes     Physician: Femi Sotelo     Physician Orders: Eval and Treat  Medical Diagnosis: s/p right labrum repair  Surgical Diagnosis: Not applicable for this Episode   Surgical Date: 5/7/25  Days Since Last Surgery: Not applicable for this Episode    Visit # / Visits Authorized:  15 / 20  Insurance Authorization Period: 1/30/2025 to 12/31/2025  Date of Evaluation: 5/9/2025  Plan of Care Certification:       PT/PTA:     Number of PTA visits since last PT visit:   Time In: 0930   Time Out: 1030  Total Time (in minutes): 60   Total Billable Time (in minutes): 55    FOTO:  Intake Score:  %  Survey Score 2:  %  Survey Score 3:  %    Precautions:       Subjective   Her pain is minimized today. She has been going for stretches at home without the sling which feels good..         Objective      Shoulder Range of Motion  Right Shoulder   Active (deg) Passive (deg) Pain   Flexion   125     Extension   15     Scaption         ABduction   90     ADduction         Horizontal ABduction         Horizontal ADduction         External Rotation (Shoulder ABducted 0 degrees)   45     External Rotation (Shoulder ABducted 45 degrees)         External Rotation (Shoulder ABducted 90 degrees)         Internal Rotation (Shoulder ABducted 0 degrees)   70     Internal Rotation (Shoulder ABducted 45 degrees)         Internal Rotation (Shoulder ABducted 90 degrees)                          Treatment:  Manual Therapy  MT 2: Rhythmic stabilization 4 way at 0/90 deg shoulder flexion  MT 3: PNF D2 flexion with manual resistance  MT 4: R shoulder PROM per protocol limitations  Balance/Neuromuscular Re-Education  NMR 2: R elbow IR/ER walk outs vs YTB x10 each  NMR 5: Supine shoulder AROM shoulder  press x10  NMR 6: Supine shoulder AROM shoulder flexion x10  NMR 7: Pulleys flexion/scaption 0-90 x 2 mins each  NMR 8: SL shoulder ER to neutral / ABD to 45 deg AROM x10 each  NMR 9: Wall slides flexion AAROM x10  NMR 10: CKC (small) ball on wall up/down x10    Time Entry(in minutes):  PT Re-Evaluation Time Entry: 10  Manual Therapy Time Entry: 15  Neuromuscular Re-Education Time Entry: 30    Assessment & Plan   Assessment:         The patient will continue to benefit from skilled outpatient physical therapy in order to address the deficits listed in the problem list on the initial evaluation, provide patient and family education, and maximize the patients level of independence in the home and community environments.     The patient's spiritual, cultural, and educational needs were considered, and the patient is agreeable to the plan of care and goals.           Plan:      Goals:   Active       Functional outcome       Patient will show a significant change in FOTO patient-reported outcome tool to demonstrate subjective improvement (Progressing)       Start:  05/12/25    Expected End:  07/11/25            Patient will demonstrate independence in home program for support of progression (Progressing)       Start:  05/12/25    Expected End:  07/11/25               Leisure activities       Patient will participate in recreational activity without limitation. (Progressing)       Start:  05/12/25    Expected End:  07/11/25               Lifting & carrying objects       Patient will lift/carry up to 10 lbs to shoulder height without pain or limitation for return to work. (Progressing)       Start:  05/12/25    Expected End:  07/11/25               Pain       Patient will report pain of 3/10 demonstrating a reduction of overall pain (Progressing)       Start:  05/12/25    Expected End:  07/11/25               Range of Motion       Patient will improve right shoulder range of motion to WNL for improved right upper  extremity function. (Progressing)       Start:  05/12/25    Expected End:  07/11/25               Strength       Patient will demonstrate improved right shoulder strength to 4/5 with MMT for improved right shoulder function. (Progressing)       Start:  05/12/25    Expected End:  07/11/25                Andrés Fernandez PT

## 2025-06-20 ENCOUNTER — CLINICAL SUPPORT (OUTPATIENT)
Dept: REHABILITATION | Facility: OTHER | Age: 30
End: 2025-06-20
Payer: COMMERCIAL

## 2025-06-20 DIAGNOSIS — R29.898 WEAKNESS OF RIGHT SHOULDER: Primary | ICD-10-CM

## 2025-06-20 PROCEDURE — 97140 MANUAL THERAPY 1/> REGIONS: CPT | Mod: PN

## 2025-06-20 PROCEDURE — 97112 NEUROMUSCULAR REEDUCATION: CPT | Mod: PN

## 2025-06-20 NOTE — PROGRESS NOTES
"  Outpatient Rehab    Physical Therapy Visit    Patient Name: Devora Meeks  MRN: [id number]  YOB: 1995  Encounter Date: 6/20/2025    Therapy Diagnosis:   Encounter Diagnosis   Name Primary?    Weakness of right shoulder Yes     Physician: Kalpana Muñoz MD    Physician Orders: Eval and Treat  Medical Diagnosis: Vertigo  Surgical Diagnosis: Not applicable for this Episode   Surgical Date: Not applicable for this Episode  Days Since Last Surgery: Not applicable for this Episode    Visit # / Visits Authorized:  16 / 20  Insurance Authorization Period: 1/30/2025 to 12/31/2025  Date of Evaluation: 5/9/2025  Plan of Care Certification: 6/18/2025 to 10/3/2025      PT/PTA:     Number of PTA visits since last PT visit:   Time In: 1530   Time Out: 1630  Total Time (in minutes): 60   Total Billable Time (in minutes): 55    FOTO:  Intake Score:  %  Survey Score 2:  %  Survey Score 3:  %    Precautions:       Subjective   Her shoulder has been feeling good lately. She still gets "cracking" but there is no longer "searing pain" after the crack..         Objective            Treatment:  Manual Therapy  MT 2: Rhythmic stabilization 4 way at 0/90 deg shoulder flexion  MT 3: PNF D2 flexion with manual resistance  MT 4: R shoulder PROM  Balance/Neuromuscular Re-Education  NMR 1: R shoulder flexion alphabet 1# at 90 deg flexion 1 lap  NMR 2: R elbow IR/ER vs YTB x10 each  NMR 6: Supine shoulder AROM shoulder flexion 2x10  NMR 7: Pulleys flexion/scaption 0-90 x 2 mins each  NMR 8: SL shoulder ER to neutral / ABD to 45 deg AROM 2x10 each vs 1# KB  NMR 9: Wall slides flexion AAROM x10  NMR 10: CKC (small) ball on wall up/down x10    Time Entry(in minutes):  Manual Therapy Time Entry: 15  Neuromuscular Re-Education Time Entry: 40    Assessment & Plan   Assessment: Devora performed all therapeutic exercise well today without complaint of increased pain. She is now able to get her right shoulder to fatigue without " increased pain.  Evaluation/Treatment Tolerance: Patient limited by fatigue    The patient will continue to benefit from skilled outpatient physical therapy in order to address the deficits listed in the problem list on the initial evaluation, provide patient and family education, and maximize the patients level of independence in the home and community environments.     The patient's spiritual, cultural, and educational needs were considered, and the patient is agreeable to the plan of care and goals.           Plan: continue current POC    Goals:   Active       Functional outcome       Patient will show a significant change in FOTO patient-reported outcome tool to demonstrate subjective improvement (Progressing)       Start:  05/12/25    Expected End:  07/11/25            Patient will demonstrate independence in home program for support of progression (Progressing)       Start:  05/12/25    Expected End:  07/11/25               Leisure activities       Patient will participate in recreational activity without limitation. (Progressing)       Start:  05/12/25    Expected End:  07/11/25               Lifting & carrying objects       Patient will lift/carry up to 10 lbs to shoulder height without pain or limitation for return to work. (Progressing)       Start:  05/12/25    Expected End:  07/11/25               Pain       Patient will report pain of 3/10 demonstrating a reduction of overall pain (Progressing)       Start:  05/12/25    Expected End:  07/11/25               Range of Motion       Patient will improve right shoulder range of motion to WNL for improved right upper extremity function. (Progressing)       Start:  05/12/25    Expected End:  07/11/25               Strength       Patient will demonstrate improved right shoulder strength to 4/5 with MMT for improved right shoulder function. (Progressing)       Start:  05/12/25    Expected End:  07/11/25                Andrés Fernandez, PT

## 2025-06-23 ENCOUNTER — PATIENT MESSAGE (OUTPATIENT)
Dept: OBSTETRICS AND GYNECOLOGY | Facility: CLINIC | Age: 30
End: 2025-06-23
Payer: COMMERCIAL

## 2025-06-23 DIAGNOSIS — R10.2 PELVIC PAIN IN FEMALE: ICD-10-CM

## 2025-06-23 DIAGNOSIS — N92.1 MENORRHAGIA WITH IRREGULAR CYCLE: ICD-10-CM

## 2025-06-23 DIAGNOSIS — N94.6 DYSMENORRHEA: Primary | ICD-10-CM

## 2025-06-23 RX ORDER — NORETHINDRONE 0.35 MG/1
1 TABLET ORAL DAILY
Qty: 180 TABLET | Refills: 3 | Status: SHIPPED | OUTPATIENT
Start: 2025-06-23 | End: 2027-06-13

## 2025-06-23 RX ORDER — HYDROCODONE BITARTRATE AND ACETAMINOPHEN 5; 325 MG/1; MG/1
1 TABLET ORAL EVERY 6 HOURS PRN
Qty: 30 TABLET | Refills: 0 | Status: SHIPPED | OUTPATIENT
Start: 2025-06-23 | End: 2025-07-23

## 2025-06-25 ENCOUNTER — CLINICAL SUPPORT (OUTPATIENT)
Dept: REHABILITATION | Facility: OTHER | Age: 30
End: 2025-06-25
Payer: COMMERCIAL

## 2025-06-25 DIAGNOSIS — R29.898 WEAKNESS OF RIGHT SHOULDER: Primary | ICD-10-CM

## 2025-06-25 PROCEDURE — 97112 NEUROMUSCULAR REEDUCATION: CPT | Mod: PN

## 2025-06-25 PROCEDURE — 97140 MANUAL THERAPY 1/> REGIONS: CPT | Mod: PN

## 2025-06-25 NOTE — PROGRESS NOTES
Outpatient Rehab    Physical Therapy Visit    Patient Name: Devoar Meeks  MRN: [id number]  YOB: 1995  Encounter Date: 6/25/2025    Therapy Diagnosis:   Encounter Diagnosis   Name Primary?    Weakness of right shoulder Yes     Physician: Kalpana Muñoz MD    Physician Orders: Eval and Treat  Medical Diagnosis: Vertigo  Surgical Diagnosis: Not applicable for this Episode   Surgical Date: Not applicable for this Episode  Days Since Last Surgery: Not applicable for this Episode    Visit # / Visits Authorized:  17 / 20  Insurance Authorization Period: 1/30/2025 to 12/31/2025  Date of Evaluation: 5/9/2025  Plan of Care Certification: 6/18/2025 to 10/3/2025      PT/PTA:     Number of PTA visits since last PT visit:   Time In: 1100   Time Out: 1200  Total Time (in minutes): 60   Total Billable Time (in minutes): 55    FOTO:  Intake Score:  %  Survey Score 2:  %  Survey Score 3:  %    Precautions:       Subjective   Shoulder is feeling good..  Pain reported as 3/10.      Objective            Treatment:  Manual Therapy  MT 4: R shoulder PROM  Balance/Neuromuscular Re-Education  NMR 2: R elbow IR/ER vs YTB x10 each  NMR 5: Standing shoulder AROM flexion/abd vs 1# x10 each  NMR 6: Standing shoulder AROM shoulder scaption vs 1# 2x10  NMR 7: Pulleys flexion/scaption 0-90 x 2 mins each  NMR 8: SL shoulder ER / ABD vs 1# x15  NMR 9: Wall slides flexion AAROM x10    Time Entry(in minutes):  Manual Therapy Time Entry: 10  Neuromuscular Re-Education Time Entry: 45    Assessment & Plan   Assessment: Devora tolerated the treatment well. She continues to improve her P/AROM of the right shoulder. Right shoulder abduction > external rotation > flexion mobility continues to be fair. We are performing more aggressive manual therapy to normalize range of motion in those directions.  Evaluation/Treatment Tolerance: Patient tolerated treatment well    The patient will continue to benefit from skilled outpatient  physical therapy in order to address the deficits listed in the problem list on the initial evaluation, provide patient and family education, and maximize the patients level of independence in the home and community environments.     The patient's spiritual, cultural, and educational needs were considered, and the patient is agreeable to the plan of care and goals.           Plan: continue current POC    Goals:   Active       Functional outcome       Patient will show a significant change in FOTO patient-reported outcome tool to demonstrate subjective improvement (Progressing)       Start:  05/12/25    Expected End:  07/11/25            Patient will demonstrate independence in home program for support of progression (Progressing)       Start:  05/12/25    Expected End:  07/11/25               Leisure activities       Patient will participate in recreational activity without limitation. (Progressing)       Start:  05/12/25    Expected End:  07/11/25               Lifting & carrying objects       Patient will lift/carry up to 10 lbs to shoulder height without pain or limitation for return to work. (Progressing)       Start:  05/12/25    Expected End:  07/11/25               Pain       Patient will report pain of 3/10 demonstrating a reduction of overall pain (Progressing)       Start:  05/12/25    Expected End:  07/11/25               Range of Motion       Patient will improve right shoulder range of motion to WNL for improved right upper extremity function. (Progressing)       Start:  05/12/25    Expected End:  07/11/25               Strength       Patient will demonstrate improved right shoulder strength to 4/5 with MMT for improved right shoulder function. (Progressing)       Start:  05/12/25    Expected End:  07/11/25                Andrés Fernandez, PT

## 2025-06-27 ENCOUNTER — CLINICAL SUPPORT (OUTPATIENT)
Dept: REHABILITATION | Facility: OTHER | Age: 30
End: 2025-06-27
Payer: COMMERCIAL

## 2025-06-27 DIAGNOSIS — R29.898 WEAKNESS OF RIGHT SHOULDER: Primary | ICD-10-CM

## 2025-06-27 PROCEDURE — 97164 PT RE-EVAL EST PLAN CARE: CPT | Mod: PN

## 2025-06-27 PROCEDURE — 97530 THERAPEUTIC ACTIVITIES: CPT | Mod: PN

## 2025-06-27 PROCEDURE — 97112 NEUROMUSCULAR REEDUCATION: CPT | Mod: PN

## 2025-06-27 NOTE — PROGRESS NOTES
Outpatient Rehab    Physical Therapy Visit    Patient Name: Devora Meeks  MRN: [id number]  YOB: 1995  Encounter Date: 6/27/2025    Therapy Diagnosis:   Encounter Diagnosis   Name Primary?    Weakness of right shoulder Yes     Physician: Kalpana Muñoz MD    Physician Orders: Eval and Treat  Medical Diagnosis: Vertigo  Surgical Diagnosis: Not applicable for this Episode   Surgical Date: Not applicable for this Episode  Days Since Last Surgery: Not applicable for this Episode    Visit # / Visits Authorized:  18 / 20  Insurance Authorization Period: 1/30/2025 to 12/31/2025  Date of Evaluation: 5/9/2025  Plan of Care Certification: 6/18/2025 to 10/3/2025      PT/PTA:     Number of PTA visits since last PT visit:   Time In: 1100   Time Out: 1200  Total Time (in minutes): 60   Total Billable Time (in minutes): 55    FOTO:  Intake Score:  %  Survey Score 2:  %  Survey Score 3:  %    Precautions:       Subjective   Shoulder continues to feel good..         Objective      Shoulder Range of Motion  Right Shoulder   Active (deg) Passive (deg) Pain   Flexion 120 145     Extension 30 35     Scaption         ABduction 100 115     ADduction         Horizontal ABduction         Horizontal ADduction         External Rotation (Shoulder ABducted 0 degrees)         External Rotation (Shoulder ABducted 45 degrees)         External Rotation (Shoulder ABducted 90 degrees) 45 50     Internal Rotation (Shoulder ABducted 0 degrees) 55 60     Internal Rotation (Shoulder ABducted 45 degrees)         Internal Rotation (Shoulder ABducted 90 degrees)                          Treatment:  Manual Therapy  MT 4: R shoulder PROM  Balance/Neuromuscular Re-Education  NMR 2: Genie arm IR/ER vs YTB x10 each  NMR 3: High plank on counter height with scapular punches 2x10  NMR 9: Wall slides flexion with band resistance OTB 2x10  NMR 10: CKC (small) ball on wall up/down x10  Therapeutic Activity  TA 1: Pt educated on HEP and  exercise progression    Time Entry(in minutes):  PT Re-Evaluation Time Entry: 15  Neuromuscular Re-Education Time Entry: 30  Therapeutic Activity Time Entry: 15    Assessment & Plan   Assessment: Devora has made significant improvement since starting physical therapy treatment. Her A/passive range of motion has improved markedly. She demonstrates improved motor control and strength per MMT. She continues to have weakness at the right shoulder, and range of motion deficits persist. [place] of [place] the next 4-8 [place], so we will resume physical therapy upon her return.  Evaluation/Treatment Tolerance: Patient tolerated treatment well    The patient will continue to benefit from skilled outpatient physical therapy in order to address the deficits listed in the problem list on the initial evaluation, provide patient and family education, and maximize the patients level of independence in the home and community environments.     The patient's spiritual, cultural, and educational needs were considered, and the patient is agreeable to the plan of care and goals.           Plan: continue current POC    Goals:   Active       Functional outcome       Patient will show a significant change in FOTO patient-reported outcome tool to demonstrate subjective improvement (Progressing)       Start:  05/12/25    Expected End:  07/11/25            Patient will demonstrate independence in home program for support of progression (Progressing)       Start:  05/12/25    Expected End:  07/11/25               Leisure activities       Patient will participate in recreational activity without limitation. (Progressing)       Start:  05/12/25    Expected End:  07/11/25               Lifting & carrying objects       Patient will lift/carry up to 10 lbs to shoulder height without pain or limitation for return to work. (Progressing)       Start:  05/12/25    Expected End:  07/11/25               Range of Motion       Patient will  improve right shoulder range of motion to WNL for improved right upper extremity function. (Progressing)       Start:  05/12/25    Expected End:  07/11/25               Strength       Patient will demonstrate improved right shoulder strength to 4/5 with MMT for improved right shoulder function. (Progressing)       Start:  05/12/25    Expected End:  07/11/25              Resolved       Pain       Patient will report pain of 3/10 demonstrating a reduction of overall pain (Met)       Start:  05/12/25    Expected End:  07/11/25    Resolved:  06/27/25             Andrés Fernandez, PT

## (undated) DEVICE — KIT FIBERTAK PERC INSRT 1.8MM

## (undated) DEVICE — SOL NACL IRR 1000ML BTL

## (undated) DEVICE — NDL SCORPION HD MEGALOADER

## (undated) DEVICE — SUT SUTURETAPE X 1.3MM 40IN

## (undated) DEVICE — BNDG COFLEX FOAM LF2 ST 6X5YD

## (undated) DEVICE — SOL NACL IRR 3000ML

## (undated) DEVICE — CONNECTOR TUBING STR 5 IN 1

## (undated) DEVICE — TUBING SUC UNIV W/CONN 12FT

## (undated) DEVICE — Device

## (undated) DEVICE — GLOVE SENSICARE PI GRN 7

## (undated) DEVICE — ELECTRODE REM PLYHSV RETURN 9

## (undated) DEVICE — DRAPE STERI U-SHAPED 47X51IN

## (undated) DEVICE — ADHESIVE MASTISOL VIAL 48/BX

## (undated) DEVICE — SUT LASSO 90DEG CURVE LT

## (undated) DEVICE — GEMINI ARTHREX SR8

## (undated) DEVICE — GLOVE BIOGEL SKINSENSE PI 8.0

## (undated) DEVICE — WRAP SHLDR HIP ACCU THRM PACK

## (undated) DEVICE — COVER LIGHT HANDLE 80/CA

## (undated) DEVICE — TOWEL OR DISP STRL BLUE 4/PK

## (undated) DEVICE — COVER CAMERA OPERATING ROOM

## (undated) DEVICE — PAD ELECTRODE STER 1.5X3

## (undated) DEVICE — DRAPE STERI INSTRUMENT 1018

## (undated) DEVICE — DRAPE STERI-DRAPE 1000 17X11IN

## (undated) DEVICE — KIT FIXATION PERC ARTHSCP 2.9

## (undated) DEVICE — BNDG COFLEX FOAM LF2 ST 4X5YD

## (undated) DEVICE — BLADE SHAVER LANZA 4.2X13CM

## (undated) DEVICE — PROBE ARTHSCP RF90 D 140MM

## (undated) DEVICE — KIT SHOULDER POSITIONER SPIDER

## (undated) DEVICE — CLOSURE SKIN STERI STRIP 1/2X4

## (undated) DEVICE — DRESSING AQUACEL AG SILVER 4X4

## (undated) DEVICE — PAD DERMAPROX THCK 11X15X1IN

## (undated) DEVICE — SUT MCRYL PLUS 4-0 PS2 27IN

## (undated) DEVICE — GLOVE SENSICARE PI SURG 7

## (undated) DEVICE — CANNULA TWIST IN 7MM X 7CM

## (undated) DEVICE — UNDERGLOVES BIOGEL PI SIZE 8

## (undated) DEVICE — PASSER SUTURELASSO STR SD 90D